# Patient Record
Sex: FEMALE | Race: WHITE | NOT HISPANIC OR LATINO | Employment: FULL TIME | URBAN - METROPOLITAN AREA
[De-identification: names, ages, dates, MRNs, and addresses within clinical notes are randomized per-mention and may not be internally consistent; named-entity substitution may affect disease eponyms.]

---

## 2017-01-10 ENCOUNTER — CLINICAL SUPPORT NO REQUIREMENTS (OUTPATIENT)
Dept: CARDIOLOGY | Facility: CLINIC | Age: 47
End: 2017-01-10

## 2017-01-10 DIAGNOSIS — I42.9 CARDIOMYOPATHY (HCC): Primary | ICD-10-CM

## 2017-06-21 ENCOUNTER — CLINICAL SUPPORT NO REQUIREMENTS (OUTPATIENT)
Dept: CARDIOLOGY | Facility: CLINIC | Age: 47
End: 2017-06-21

## 2017-06-21 ENCOUNTER — OFFICE VISIT (OUTPATIENT)
Dept: CARDIOLOGY | Facility: CLINIC | Age: 47
End: 2017-06-21

## 2017-06-21 VITALS
BODY MASS INDEX: 32.82 KG/M2 | WEIGHT: 197 LBS | SYSTOLIC BLOOD PRESSURE: 118 MMHG | HEIGHT: 65 IN | HEART RATE: 68 BPM | DIASTOLIC BLOOD PRESSURE: 76 MMHG

## 2017-06-21 DIAGNOSIS — I42.8 CARDIOMYOPATHY, NONISCHEMIC (HCC): Primary | ICD-10-CM

## 2017-06-21 DIAGNOSIS — I47.1 ATRIAL TACHYCARDIA, PAROXYSMAL (HCC): ICD-10-CM

## 2017-06-21 DIAGNOSIS — Z95.810 S/P ICD (INTERNAL CARDIAC DEFIBRILLATOR) PROCEDURE: ICD-10-CM

## 2017-06-21 DIAGNOSIS — E66.09 OBESITY DUE TO EXCESS CALORIES, UNSPECIFIED OBESITY SEVERITY: ICD-10-CM

## 2017-06-21 PROBLEM — E66.9 OBESITY: Status: ACTIVE | Noted: 2017-06-21

## 2017-06-21 PROCEDURE — 99213 OFFICE O/P EST LOW 20 MIN: CPT | Performed by: NURSE PRACTITIONER

## 2017-06-21 PROCEDURE — 93284 PRGRMG EVAL IMPLANTABLE DFB: CPT | Performed by: INTERNAL MEDICINE

## 2017-06-21 PROCEDURE — 93000 ELECTROCARDIOGRAM COMPLETE: CPT | Performed by: NURSE PRACTITIONER

## 2017-06-21 NOTE — PROGRESS NOTES
"Date of Office Visit: 2017  Encounter Provider: WILD Lyle  Place of Service: [facility] CARDIOLOGY  Patient Name: Tiffani Fiore  :1970    Chief Complaint   Patient presents with   • Cardiomyopathy     s/p CRT-D   • Irregular Heart Beat   :     HPI: Tiffani Fiore is a 46 y.o. female who is a patient of Dr. Yancey's, who is new to me. Old records reviewed.  She has a past medical history of nonischemic cardiomyopathy, paroxysmal atrial tachycardia, DVT/PE (, was on warfarin for a year), MI (No obstructive disease or intervention), CHFand nonspecific IVCD with left bundle-branch block morphology.  She has status post CRT-D placement.  She had her initial device placed in [place] in 2012. She moved to [place] and saw Dr. Yancey for the first time in 2014.  Over the period of about a year she had episodes of congestive heart failure, decreased LV systolic function, rise in LV lead threshold, phrenic nerve stimulation and loss of LV capture despite good medical therapy and trying to do optimal programming of her device. She underwent CRT-D generator change, new LV lead placement and partial extraction of existing LV lead on November 15, 2015.  Her most recent echocardiogram was in 2016 which per the report showed no significant changes since 2014.  It showed moderate global hypokinesis, ejection fraction 38%, mild MR, trace TR, trace VT in an RVSP of 18 mmHg. She was last seen by Dr. Yancey on May 2, 2016 and was doing well at that time. She presented to the ER on 2016 with complaints of ICD shocks. She had been participating in a \"flash mob\" dance and it shocked her 5 times. It was interrogated in the ER and indeed it had shocker her 5 times for sinus tachycardia at 191 bpm. Her VT rate cutoff was set at 190 bpm. Her rate cutoff was increased and her duration was increased and she was discharged home and followed " up with the pacemaker clinic. Her last remote check was in 2017 which showed 1% Ap, 96% BiV pacing, 22 ATR episodes, longest 1 min. 32 sec. (She is known to have these). She presents today for routine follow up.    She is doing well. No chest pain, shortness of breath, dizziness, edema or palpitations. She is going to the gym 2-4 times per week and working with a .     Device interrogation today shows her most DDD CRT-D function.  She has atrially paced less than 1% and BiV paced 96% of the time since her last evaluation.  Her underlying rhythm is AS/VS at 69 bpm.  She's had 38 ATR episodes since 2017 with the longest one being 2 minutes and 35 seconds.  EGM's that are available to review all show 1-1 conduction sinus tach rate of 170's and all correspond to when she was working out at the gym.          Past Medical History:   Diagnosis Date   • Cardiomyopathy    • Chest pain    • CHF (congestive heart failure)    • Depression    • DVT (deep venous thrombosis)    • Dyspnea    • Fatigue    • Hyperlipidemia    • Migraine    • Myocardial infarction    • Palpitations    • Pulmonary embolism    • Tachycardia        Past Surgical History:   Procedure Laterality Date   • CARDIAC DEFIBRILLATOR PLACEMENT  11/15/2015   •  SECTION     • HYSTERECTOMY     • TUBAL ABDOMINAL LIGATION         Social History     Social History   • Marital status: Single     Spouse name: N/A   • Number of children: N/A   • Years of education: N/A     Occupational History   • Not on file.     Social History Main Topics   • Smoking status: Former Smoker   • Smokeless tobacco: Not on file   • Alcohol use No   • Drug use: No   • Sexual activity: Not on file     Other Topics Concern   • Not on file     Social History Narrative       Family History   Problem Relation Age of Onset   • Diabetes Father    • Cancer Father      lung       Review of Systems   Constitution: Negative for chills, fever, malaise/fatigue, weight gain  "and weight loss.   HENT: Negative for ear pain, headaches, hearing loss, nosebleeds and sore throat.    Eyes: Negative for double vision, pain and visual disturbance.   Cardiovascular: Negative for chest pain, dyspnea on exertion, irregular heartbeat, leg swelling, near-syncope, orthopnea, palpitations, paroxysmal nocturnal dyspnea and syncope.   Respiratory: Negative for cough, shortness of breath, sleep disturbances due to breathing, snoring and wheezing.    Endocrine: Negative for cold intolerance, heat intolerance and polyuria.   Skin: Negative for itching and rash.   Musculoskeletal: Negative for joint pain, joint swelling and myalgias.   Gastrointestinal: Negative for abdominal pain, diarrhea, melena, nausea and vomiting.   Genitourinary: Negative for frequency, hematuria and hesitancy.   Neurological: Negative for excessive daytime sleepiness, light-headedness, numbness, paresthesias and seizures.   Psychiatric/Behavioral: Negative for altered mental status and depression.   Allergic/Immunologic: Negative.    All other systems reviewed and are negative.      No Known Allergies      Current Outpatient Prescriptions:   •  aspirin 81 MG tablet, Take  by mouth daily., Disp: , Rfl:   •  carvedilol (COREG) 25 MG tablet, Take  by mouth 2 (two) times a day., Disp: , Rfl:   •  lisinopril (PRINIVIL,ZESTRIL) 2.5 MG tablet, Take  by mouth daily., Disp: , Rfl:      Objective:     Vitals:    06/21/17 0842   BP: 118/76   Pulse: 68   Weight: 197 lb (89.4 kg)   Height: 65\" (165.1 cm)     Body mass index is 32.78 kg/(m^2).    PHYSICAL EXAM:    Vitals Reviewed.   General Appearance: No acute distress, well developed and well nourished.   Eyes: Conjunctiva and lids: No erythema, swelling, or discharge. Sclera non-icteric.   HENT: Atraumatic, normocephalic. External eyes, ears, and nose normal. No hearing loss noted. Mucous membranes normal. Lips not cyanotic. Neck supple with no tenderness.  Respiratory: No signs of respiratory " distress. Respiration rhythm and depth normal.   Clear to auscultation. No rales, crackles, rhonchi, or wheezing auscultated.   Cardiovascular:  Jugular Venous Pressure: Normal  Heart Rate and Rhythm: Normal, Heart Sounds: Normal S1 and S2. No S3 or S4 noted.  Murmurs: No murmurs noted. No rubs, thrills, or gallops.   Arterial Pulses: Posterior tibialis and dorsalis pedis pulses normal.   Lower Extremities: No edema noted.  Gastrointestinal:  Abdomen soft, non-distended, non-tender. Normal bowel sounds. No hepatomegaly.   Musculoskeletal: Normal movement of extremities  Skin and Nails: General appearance normal. No pallor, cyanosis, diaphoresis. Skin temperature normal. No clubbing of fingernails.   Psychiatric: Patient alert and oriented to person, place, and time. Speech and behavior appropriate. Normal mood and affect.       ECG 12 Lead  Date/Time: 2017 9:13 AM  Performed by: MAGGIE PRESTON  Authorized by: MAGGIE PRESTON   Comparison: compared with previous ECG from 2016  Similar to previous ECG  Rhythm: paced  BPM: 68  Pacin% capture  Clinical impression: abnormal ECG  Comments: Clinical indication: NICM, PAT              Assessment:       Diagnosis Plan   1. Cardiomyopathy, nonischemic  ECG 12 Lead   2. Atrial tachycardia, paroxysmal  ECG 12 Lead   3. S/P ICD (internal cardiac defibrillator) procedure  ECG 12 Lead   4. Obesity due to excess calories, unspecified obesity severity            Plan:       1. NICM, stable on good GDMT. No CHF by history or exam.    2. PAT, no significant episodes.    3. CRT-D, function wnl, ECG with BiV pacing.    4. For the problem of overweight/obesity, we discussed the importance of lifestyle measures and strategies for weight loss, such as improved nutrition, regular exercise and sleep hygiene.        Remote device check in 3 months, clinic in 6 months, see Dr. Yancye in 1 year or sooner should the need arise.     As always, it has been a pleasure to participate  in your patient's care.      Sincerely,         WILD Steele

## 2017-08-22 ENCOUNTER — HOSPITAL ENCOUNTER (INPATIENT)
Facility: HOSPITAL | Age: 47
LOS: 2 days | Discharge: HOME | End: 2017-08-25
Attending: EMERGENCY MEDICINE | Admitting: INTERNAL MEDICINE

## 2017-08-22 DIAGNOSIS — I47.20 VENTRICULAR TACHYCARDIA (HCC): Primary | ICD-10-CM

## 2017-08-22 PROCEDURE — 93005 ELECTROCARDIOGRAM TRACING: CPT | Performed by: EMERGENCY MEDICINE

## 2017-08-22 PROCEDURE — 99285 EMERGENCY DEPT VISIT HI MDM: CPT

## 2017-08-23 ENCOUNTER — APPOINTMENT (OUTPATIENT)
Dept: GENERAL RADIOLOGY | Facility: HOSPITAL | Age: 47
End: 2017-08-23

## 2017-08-23 ENCOUNTER — CLINICAL SUPPORT NO REQUIREMENTS (OUTPATIENT)
Dept: CARDIOLOGY | Facility: CLINIC | Age: 47
End: 2017-08-23

## 2017-08-23 ENCOUNTER — APPOINTMENT (OUTPATIENT)
Dept: CARDIOLOGY | Facility: HOSPITAL | Age: 47
End: 2017-08-23

## 2017-08-23 DIAGNOSIS — I42.9 CARDIOMYOPATHY (HCC): Primary | ICD-10-CM

## 2017-08-23 PROBLEM — I47.20 VENTRICULAR TACHYCARDIA (HCC): Status: ACTIVE | Noted: 2017-08-23

## 2017-08-23 LAB
ALBUMIN SERPL-MCNC: 4.3 G/DL (ref 3.5–5.2)
ALBUMIN/GLOB SERPL: 1.4 G/DL
ALP SERPL-CCNC: 110 U/L (ref 39–117)
ALT SERPL W P-5'-P-CCNC: 13 U/L (ref 1–33)
ANION GAP SERPL CALCULATED.3IONS-SCNC: 13.1 MMOL/L
ANION GAP SERPL CALCULATED.3IONS-SCNC: 13.2 MMOL/L
AORTIC ARCH: 2.2 CM
APTT PPP: 28.4 SECONDS (ref 22.7–35.4)
ASCENDING AORTA: 3.3 CM
AST SERPL-CCNC: 18 U/L (ref 1–32)
BASOPHILS # BLD AUTO: 0.02 10*3/MM3 (ref 0–0.2)
BASOPHILS NFR BLD AUTO: 0.2 % (ref 0–1.5)
BH CV ECHO MEAS - ACS: 1.9 CM
BH CV ECHO MEAS - AO MAX PG: 10 MMHG
BH CV ECHO MEAS - AO MEAN PG (FULL): 3 MMHG
BH CV ECHO MEAS - AO MEAN PG: 6 MMHG
BH CV ECHO MEAS - AO ROOT AREA (BSA CORRECTED): 1.3
BH CV ECHO MEAS - AO ROOT AREA: 4.9 CM^2
BH CV ECHO MEAS - AO ROOT DIAM: 2.5 CM
BH CV ECHO MEAS - AO V2 MAX: 155 CM/SEC
BH CV ECHO MEAS - AO V2 MEAN: 116 CM/SEC
BH CV ECHO MEAS - AO V2 VTI: 37.6 CM
BH CV ECHO MEAS - ASC AORTA: 3.3 CM
BH CV ECHO MEAS - AVA(I,A): 2.1 CM^2
BH CV ECHO MEAS - AVA(I,D): 2.1 CM^2
BH CV ECHO MEAS - BSA(HAYCOCK): 2 M^2
BH CV ECHO MEAS - BSA: 2 M^2
BH CV ECHO MEAS - BZI_BMI: 32.3 KILOGRAMS/M^2
BH CV ECHO MEAS - BZI_METRIC_HEIGHT: 165.1 CM
BH CV ECHO MEAS - BZI_METRIC_WEIGHT: 88 KG
BH CV ECHO MEAS - CONTRAST EF (2CH): 25.6 ML/M^2
BH CV ECHO MEAS - CONTRAST EF 4CH: 24 ML/M^2
BH CV ECHO MEAS - EDV(CUBED): 157.5 ML
BH CV ECHO MEAS - EDV(MOD-SP2): 168 ML
BH CV ECHO MEAS - EDV(MOD-SP4): 229 ML
BH CV ECHO MEAS - EDV(TEICH): 141.3 ML
BH CV ECHO MEAS - EF(CUBED): 34.1 %
BH CV ECHO MEAS - EF(MOD-SP2): 25.6 %
BH CV ECHO MEAS - EF(MOD-SP4): 24 %
BH CV ECHO MEAS - EF(TEICH): 27.6 %
BH CV ECHO MEAS - ESV(CUBED): 103.8 ML
BH CV ECHO MEAS - ESV(MOD-SP2): 125 ML
BH CV ECHO MEAS - ESV(MOD-SP4): 174 ML
BH CV ECHO MEAS - ESV(TEICH): 102.4 ML
BH CV ECHO MEAS - FS: 13 %
BH CV ECHO MEAS - IVS/LVPW: 0.77
BH CV ECHO MEAS - IVSD: 1 CM
BH CV ECHO MEAS - LAT PEAK E' VEL: 6 CM/SEC
BH CV ECHO MEAS - LV DIASTOLIC VOL/BSA (35-75): 117.3 ML/M^2
BH CV ECHO MEAS - LV MASS(C)D: 249.4 GRAMS
BH CV ECHO MEAS - LV MASS(C)DI: 127.7 GRAMS/M^2
BH CV ECHO MEAS - LV MAX PG: 5 MMHG
BH CV ECHO MEAS - LV MEAN PG: 3 MMHG
BH CV ECHO MEAS - LV SYSTOLIC VOL/BSA (12-30): 89.1 ML/M^2
BH CV ECHO MEAS - LV V1 MAX: 107 CM/SEC
BH CV ECHO MEAS - LV V1 MEAN: 78.2 CM/SEC
BH CV ECHO MEAS - LV V1 VTI: 25 CM
BH CV ECHO MEAS - LVIDD: 5.4 CM
BH CV ECHO MEAS - LVIDS: 4.7 CM
BH CV ECHO MEAS - LVLD AP2: 8.6 CM
BH CV ECHO MEAS - LVLD AP4: 9.5 CM
BH CV ECHO MEAS - LVLS AP2: 7.7 CM
BH CV ECHO MEAS - LVLS AP4: 9.2 CM
BH CV ECHO MEAS - LVOT AREA (M): 3.1 CM^2
BH CV ECHO MEAS - LVOT AREA: 3.1 CM^2
BH CV ECHO MEAS - LVOT DIAM: 2 CM
BH CV ECHO MEAS - LVPWD: 1.3 CM
BH CV ECHO MEAS - MED PEAK E' VEL: 6 CM/SEC
BH CV ECHO MEAS - MV A DUR: 0.16 SEC
BH CV ECHO MEAS - MV A MAX VEL: 109 CM/SEC
BH CV ECHO MEAS - MV DEC SLOPE: 943 CM/SEC^2
BH CV ECHO MEAS - MV DEC TIME: 0.16 SEC
BH CV ECHO MEAS - MV E MAX VEL: 95.8 CM/SEC
BH CV ECHO MEAS - MV E/A: 0.88
BH CV ECHO MEAS - MV MAX PG: 9 MMHG
BH CV ECHO MEAS - MV MEAN PG: 3 MMHG
BH CV ECHO MEAS - MV P1/2T MAX VEL: 130 CM/SEC
BH CV ECHO MEAS - MV P1/2T: 40.4 MSEC
BH CV ECHO MEAS - MV V2 MEAN: 84.3 CM/SEC
BH CV ECHO MEAS - MV V2 VTI: 32.8 CM
BH CV ECHO MEAS - MVA P1/2T LCG: 1.7 CM^2
BH CV ECHO MEAS - MVA(P1/2T): 5.4 CM^2
BH CV ECHO MEAS - MVA(VTI): 2.4 CM^2
BH CV ECHO MEAS - PA ACC SLOPE: 1432 CM/SEC^2
BH CV ECHO MEAS - PA ACC TIME: 0.1 SEC
BH CV ECHO MEAS - PA MAX PG (FULL): 6.6 MMHG
BH CV ECHO MEAS - PA MAX PG: 8.9 MMHG
BH CV ECHO MEAS - PA PR(ACCEL): 34.5 MMHG
BH CV ECHO MEAS - PA V2 MAX: 149 CM/SEC
BH CV ECHO MEAS - PI END-D VEL: 128 CM/SEC
BH CV ECHO MEAS - PULM A REVS DUR: 0.09 SEC
BH CV ECHO MEAS - PULM A REVS VEL: 31.6 CM/SEC
BH CV ECHO MEAS - PULM DIAS VEL: 35.9 CM/SEC
BH CV ECHO MEAS - PULM S/D: 1.7
BH CV ECHO MEAS - PULM SYS VEL: 61.5 CM/SEC
BH CV ECHO MEAS - PVA(V,A): 2.1 CM^2
BH CV ECHO MEAS - PVA(V,D): 2.1 CM^2
BH CV ECHO MEAS - QP/QS: 1
BH CV ECHO MEAS - RAP SYSTOLE: 3 MMHG
BH CV ECHO MEAS - RV MAX PG: 2.3 MMHG
BH CV ECHO MEAS - RV MEAN PG: 1 MMHG
BH CV ECHO MEAS - RV V1 MAX: 75.9 CM/SEC
BH CV ECHO MEAS - RV V1 MEAN: 57.2 CM/SEC
BH CV ECHO MEAS - RV V1 VTI: 18.9 CM
BH CV ECHO MEAS - RVOT AREA: 4.2 CM^2
BH CV ECHO MEAS - RVOT DIAM: 2.3 CM
BH CV ECHO MEAS - RVSP: 33 MMHG
BH CV ECHO MEAS - SI(AO): 94.5 ML/M^2
BH CV ECHO MEAS - SI(CUBED): 27.5 ML/M^2
BH CV ECHO MEAS - SI(LVOT): 40.2 ML/M^2
BH CV ECHO MEAS - SI(MOD-SP2): 22 ML/M^2
BH CV ECHO MEAS - SI(MOD-SP4): 28.2 ML/M^2
BH CV ECHO MEAS - SI(TEICH): 19.9 ML/M^2
BH CV ECHO MEAS - SUP REN AO DIAM: 1.5 CM
BH CV ECHO MEAS - SV(AO): 184.6 ML
BH CV ECHO MEAS - SV(CUBED): 53.6 ML
BH CV ECHO MEAS - SV(LVOT): 78.5 ML
BH CV ECHO MEAS - SV(MOD-SP2): 43 ML
BH CV ECHO MEAS - SV(MOD-SP4): 55 ML
BH CV ECHO MEAS - SV(RVOT): 78.5 ML
BH CV ECHO MEAS - SV(TEICH): 39 ML
BH CV ECHO MEAS - TAPSE (>1.6): 1.6 CM2
BH CV ECHO MEAS - TR MAX VEL: 274 CM/SEC
BH CV VAS BP RIGHT ARM: NORMAL MMHG
BH CV XLRA - RV BASE: 3.2 CM
BH CV XLRA - TDI S': 10 CM/SEC
BILIRUB SERPL-MCNC: 0.3 MG/DL (ref 0.1–1.2)
BUN BLD-MCNC: 14 MG/DL (ref 6–20)
BUN BLD-MCNC: 16 MG/DL (ref 6–20)
BUN/CREAT SERPL: 21.5 (ref 7–25)
BUN/CREAT SERPL: 24.6 (ref 7–25)
CALCIUM SPEC-SCNC: 9.3 MG/DL (ref 8.6–10.5)
CALCIUM SPEC-SCNC: 9.4 MG/DL (ref 8.6–10.5)
CHLORIDE SERPL-SCNC: 103 MMOL/L (ref 98–107)
CHLORIDE SERPL-SCNC: 106 MMOL/L (ref 98–107)
CO2 SERPL-SCNC: 25.8 MMOL/L (ref 22–29)
CO2 SERPL-SCNC: 25.9 MMOL/L (ref 22–29)
CREAT BLD-MCNC: 0.65 MG/DL (ref 0.57–1)
CREAT BLD-MCNC: 0.65 MG/DL (ref 0.57–1)
DEPRECATED RDW RBC AUTO: 41.7 FL (ref 37–54)
E/E' RATIO: 17
EOSINOPHIL # BLD AUTO: 0.11 10*3/MM3 (ref 0–0.7)
EOSINOPHIL NFR BLD AUTO: 1.1 % (ref 0.3–6.2)
ERYTHROCYTE [DISTWIDTH] IN BLOOD BY AUTOMATED COUNT: 12.3 % (ref 11.7–13)
GFR SERPL CREATININE-BSD FRML MDRD: 98 ML/MIN/1.73
GFR SERPL CREATININE-BSD FRML MDRD: 98 ML/MIN/1.73
GLOBULIN UR ELPH-MCNC: 3 GM/DL
GLUCOSE BLD-MCNC: 112 MG/DL (ref 65–99)
GLUCOSE BLD-MCNC: 116 MG/DL (ref 65–99)
HCT VFR BLD AUTO: 42 % (ref 35.6–45.5)
HGB BLD-MCNC: 14.4 G/DL (ref 11.9–15.5)
IMM GRANULOCYTES # BLD: 0.02 10*3/MM3 (ref 0–0.03)
IMM GRANULOCYTES NFR BLD: 0.2 % (ref 0–0.5)
INR PPP: 1.16 (ref 0.9–1.1)
LEFT ATRIUM VOLUME INDEX: 23 ML/M2
LV EF 2D ECHO EST: 24 %
LYMPHOCYTES # BLD AUTO: 2.38 10*3/MM3 (ref 0.9–4.8)
LYMPHOCYTES NFR BLD AUTO: 24.5 % (ref 19.6–45.3)
MAGNESIUM SERPL-MCNC: 2.3 MG/DL (ref 1.6–2.6)
MCH RBC QN AUTO: 31.8 PG (ref 26.9–32)
MCHC RBC AUTO-ENTMCNC: 34.3 G/DL (ref 32.4–36.3)
MCV RBC AUTO: 92.7 FL (ref 80.5–98.2)
MONOCYTES # BLD AUTO: 0.64 10*3/MM3 (ref 0.2–1.2)
MONOCYTES NFR BLD AUTO: 6.6 % (ref 5–12)
NEUTROPHILS # BLD AUTO: 6.53 10*3/MM3 (ref 1.9–8.1)
NEUTROPHILS NFR BLD AUTO: 67.4 % (ref 42.7–76)
PLATELET # BLD AUTO: 188 10*3/MM3 (ref 140–500)
PMV BLD AUTO: 10.6 FL (ref 6–12)
POTASSIUM BLD-SCNC: 3.9 MMOL/L (ref 3.5–5.2)
POTASSIUM BLD-SCNC: 4.1 MMOL/L (ref 3.5–5.2)
PROT SERPL-MCNC: 7.3 G/DL (ref 6–8.5)
PROTHROMBIN TIME: 14.4 SECONDS (ref 11.7–14.2)
RBC # BLD AUTO: 4.53 10*6/MM3 (ref 3.9–5.2)
SODIUM BLD-SCNC: 142 MMOL/L (ref 136–145)
SODIUM BLD-SCNC: 145 MMOL/L (ref 136–145)
TROPONIN T SERPL-MCNC: 0.03 NG/ML (ref 0–0.03)
WBC NRBC COR # BLD: 9.7 10*3/MM3 (ref 4.5–10.7)

## 2017-08-23 PROCEDURE — 93010 ELECTROCARDIOGRAM REPORT: CPT | Performed by: INTERNAL MEDICINE

## 2017-08-23 PROCEDURE — 93295 DEV INTERROG REMOTE 1/2/MLT: CPT | Performed by: INTERNAL MEDICINE

## 2017-08-23 PROCEDURE — 93306 TTE W/DOPPLER COMPLETE: CPT | Performed by: INTERNAL MEDICINE

## 2017-08-23 PROCEDURE — 93005 ELECTROCARDIOGRAM TRACING: CPT | Performed by: INTERNAL MEDICINE

## 2017-08-23 PROCEDURE — 85730 THROMBOPLASTIN TIME PARTIAL: CPT | Performed by: EMERGENCY MEDICINE

## 2017-08-23 PROCEDURE — 83735 ASSAY OF MAGNESIUM: CPT | Performed by: EMERGENCY MEDICINE

## 2017-08-23 PROCEDURE — 84484 ASSAY OF TROPONIN QUANT: CPT | Performed by: EMERGENCY MEDICINE

## 2017-08-23 PROCEDURE — 80053 COMPREHEN METABOLIC PANEL: CPT | Performed by: EMERGENCY MEDICINE

## 2017-08-23 PROCEDURE — 80048 BASIC METABOLIC PNL TOTAL CA: CPT | Performed by: NURSE PRACTITIONER

## 2017-08-23 PROCEDURE — 25010000002 AMIODARONE IN DEXTROSE 5% 150-4.21 MG/100ML-% SOLUTION

## 2017-08-23 PROCEDURE — 93296 REM INTERROG EVL PM/IDS: CPT | Performed by: INTERNAL MEDICINE

## 2017-08-23 PROCEDURE — 85025 COMPLETE CBC W/AUTO DIFF WBC: CPT | Performed by: EMERGENCY MEDICINE

## 2017-08-23 PROCEDURE — C8929 TTE W OR WO FOL WCON,DOPPLER: HCPCS

## 2017-08-23 PROCEDURE — 85610 PROTHROMBIN TIME: CPT | Performed by: EMERGENCY MEDICINE

## 2017-08-23 PROCEDURE — 71010 HC CHEST PA OR AP: CPT

## 2017-08-23 PROCEDURE — 99223 1ST HOSP IP/OBS HIGH 75: CPT | Performed by: NURSE PRACTITIONER

## 2017-08-23 PROCEDURE — 25010000002 PERFLUTREN (DEFINITY) 8.476 MG IN SODIUM CHLORIDE 10 ML INJECTION: Performed by: INTERNAL MEDICINE

## 2017-08-23 RX ORDER — ALPRAZOLAM 0.25 MG/1
0.25 TABLET ORAL EVERY 8 HOURS
Status: DISCONTINUED | OUTPATIENT
Start: 2017-08-23 | End: 2017-08-24

## 2017-08-23 RX ORDER — ALPRAZOLAM 0.25 MG/1
0.25 TABLET ORAL EVERY 8 HOURS
Status: DISCONTINUED | OUTPATIENT
Start: 2017-08-23 | End: 2017-08-23

## 2017-08-23 RX ORDER — CARVEDILOL 6.25 MG/1
6.25 TABLET ORAL 2 TIMES DAILY
Status: DISCONTINUED | OUTPATIENT
Start: 2017-08-23 | End: 2017-08-24

## 2017-08-23 RX ORDER — ACETAMINOPHEN 325 MG/1
650 TABLET ORAL EVERY 4 HOURS PRN
Status: DISCONTINUED | OUTPATIENT
Start: 2017-08-23 | End: 2017-08-25 | Stop reason: HOSPADM

## 2017-08-23 RX ORDER — ASPIRIN 81 MG/1
81 TABLET ORAL DAILY
Status: DISCONTINUED | OUTPATIENT
Start: 2017-08-23 | End: 2017-08-25 | Stop reason: HOSPADM

## 2017-08-23 RX ORDER — SODIUM CHLORIDE 0.9 % (FLUSH) 0.9 %
10 SYRINGE (ML) INJECTION AS NEEDED
Status: DISCONTINUED | OUTPATIENT
Start: 2017-08-23 | End: 2017-08-25 | Stop reason: HOSPADM

## 2017-08-23 RX ORDER — CARVEDILOL 25 MG/1
25 TABLET ORAL 2 TIMES DAILY
Status: DISCONTINUED | OUTPATIENT
Start: 2017-08-23 | End: 2017-08-23

## 2017-08-23 RX ORDER — LISINOPRIL 2.5 MG/1
2.5 TABLET ORAL DAILY
Status: DISCONTINUED | OUTPATIENT
Start: 2017-08-23 | End: 2017-08-25 | Stop reason: HOSPADM

## 2017-08-23 RX ORDER — SOTALOL HYDROCHLORIDE 120 MG/1
120 TABLET ORAL EVERY 12 HOURS SCHEDULED
Status: DISCONTINUED | OUTPATIENT
Start: 2017-08-23 | End: 2017-08-25 | Stop reason: HOSPADM

## 2017-08-23 RX ADMIN — ALPRAZOLAM 0.25 MG: 0.25 TABLET ORAL at 17:00

## 2017-08-23 RX ADMIN — LISINOPRIL 2.5 MG: 2.5 TABLET ORAL at 12:25

## 2017-08-23 RX ADMIN — SOTALOL HYDROCHLORIDE 120 MG: 120 TABLET ORAL at 16:47

## 2017-08-23 RX ADMIN — CARVEDILOL 25 MG: 25 TABLET, FILM COATED ORAL at 12:25

## 2017-08-23 RX ADMIN — CARVEDILOL 6.25 MG: 6.25 TABLET, FILM COATED ORAL at 21:42

## 2017-08-23 RX ADMIN — ASPIRIN 81 MG: 81 TABLET ORAL at 12:25

## 2017-08-23 RX ADMIN — AMIODARONE HYDROCHLORIDE 150 MG: 1.5 INJECTION, SOLUTION INTRAVENOUS at 17:29

## 2017-08-23 RX ADMIN — PERFLUTREN 2 ML: 6.52 INJECTION, SUSPENSION INTRAVENOUS at 10:35

## 2017-08-23 NOTE — ED NOTES
AED interrogated with [employer] remote device.     Genesis Lord RN  08/23/17 0152       Genesis Lord RN  08/23/17 0152

## 2017-08-23 NOTE — PLAN OF CARE
Problem: Patient Care Overview (Adult)  Goal: Plan of Care Review  Outcome: Ongoing (interventions implemented as appropriate)  Goal: Discharge Needs Assessment  Outcome: Ongoing (interventions implemented as appropriate)    Problem: Acute Coronary Syndrome (ACS) (Adult)  Goal: Signs and Symptoms of Listed Potential Problems Will be Absent or Manageable (Acute Coronary Syndrome)  Outcome: Ongoing (interventions implemented as appropriate)    Problem: Cardiac: Heart Failure (Adult)  Goal: Signs and Symptoms of Listed Potential Problems Will be Absent or Manageable (Cardiac: Heart Failure)  Outcome: Ongoing (interventions implemented as appropriate)

## 2017-08-23 NOTE — H&P
"            Electrophysiology History & Physical    Date of Hospital Visit: 2017 11:56 PM  Encounter Provider: WILD Lyle  Place of Service: [facility] CARDIOLOGY  Patient Name: Tiffani Fiore  :1970  Referral Provider: Ani Cazares MD      Chief complaint: ICD shocks        History of Present Illness:    This is a 46 y.o. female patient of Dr. Yancey's, who I last saw in the office on 17 for routine follow up. She has a past medical history of nonischemic cardiomyopathy, paroxysmal atrial tachycardia, DVT/PE (, was on warfarin for a year), MI (No obstructive disease or intervention), CHFand nonspecific IVCD with left bundle-branch block morphology.  She is status post CRT-D placement.  She had her initial device placed in [place] in 2012. She also says that she had an EPS/Ablation for VT in [place] (we do not have those records). She moved to [place] and saw Dr. Yancey for the first time in 2014.  Over the period of about a year she had episodes of congestive heart failure, decreased LV systolic function, rise in LV lead threshold, phrenic nerve stimulation and loss of LV capture despite good medical therapy and trying to do optimal programming of her device. She underwent CRT-D generator change, new LV lead placement and partial extraction of existing LV lead on November 15, 2015.  Her most recent echocardiogram was in 2016 which showed no significant changes since 2014.  It showed moderate global hypokinesis, ejection fraction 38%, mild MR, trace TR, trace WA in an RVSP of 18 mmHg. She presented to the ER on 2016 with complaints of ICD shocks. She had been participating in a \"flash mob\" dance and it shocked her 5 times. It was interrogated in the ER and indeed it had shocker her 5 times for sinus tachycardia at 191 bpm. Her VT rate cutoff was set at 190 bpm. Her rate cutoff and duration were increased and " she has had no further shocks until yesterday. She does have ATR episodes which are typically short in duration.    She presented to the ED with complaints of her ICD shocking her twice. She says that she was at work and was walking out of and office and her device shocked her. Just prior to that when she was sitting in the office she felt her heart start racing and said she got really hot. The ICD shocked her once and she sat down and then it shocked her again. She did not have any chest pain, shortness of breath and did not feel like she was going to pass out. In the ER the device was remotely interrogated and confirmed VT episodes so she was admitted for further evaluation. She has not had any further episodes since admission. Prior to admission she was doing well with no complaints.           Past Medical History:   Diagnosis Date   • Cardiomyopathy    • Chest pain    • CHF (congestive heart failure)    • Depression    • DVT (deep venous thrombosis)    • Dyspnea    • Fatigue    • Hyperlipidemia    • Migraine    • Myocardial infarction    • Palpitations    • Pulmonary embolism    • Tachycardia          Past Surgical History:   Procedure Laterality Date   • CARDIAC DEFIBRILLATOR PLACEMENT  11/15/2015   •  SECTION     • HYSTERECTOMY     • TUBAL ABDOMINAL LIGATION         Family History   Problem Relation Age of Onset   • Diabetes Father    • Cancer Father      lung       Prescriptions Prior to Admission   Medication Sig Dispense Refill Last Dose   • aspirin 81 MG tablet Take  by mouth daily.   Taking   • carvedilol (COREG) 25 MG tablet Take  by mouth 2 (two) times a day.   Taking   • lisinopril (PRINIVIL,ZESTRIL) 2.5 MG tablet Take  by mouth daily.   Taking       Current Meds  No current facility-administered medications on file prior to encounter.      Current Outpatient Prescriptions on File Prior to Encounter   Medication Sig Dispense Refill   • aspirin 81 MG tablet Take  by mouth daily.     • carvedilol  "(COREG) 25 MG tablet Take  by mouth 2 (two) times a day.     • lisinopril (PRINIVIL,ZESTRIL) 2.5 MG tablet Take  by mouth daily.           Social History     Social History   • Marital status: Single     Spouse name: N/A   • Number of children: N/A   • Years of education: N/A     Occupational History   • Not on file.     Social History Main Topics   • Smoking status: Former Smoker   • Smokeless tobacco: Not on file   • Alcohol use No   • Drug use: No   • Sexual activity: Not on file     Other Topics Concern   • Not on file     Social History Narrative         REVIEW OF SYSTEMS: All systems reviewed. Pertinent positives identified in HPI. All other systems are negative.     12 point ROS was performed and is negative except as outlined in HPI           Objective:     Vitals:    08/23/17 0329 08/23/17 0330 08/23/17 0706 08/23/17 1505   BP:  95/66 105/68 100/58   BP Location:   Right arm Right arm   Patient Position:   Lying Sitting   Pulse: 82 80 66 81   Resp:  16 16 16   Temp:  97.8 °F (36.6 °C) 97.6 °F (36.4 °C) 97.6 °F (36.4 °C)   TempSrc:  Oral Oral Oral   SpO2: 98% 97% 100% 97%   Weight:  194 lb (88 kg)     Height:  65\" (165.1 cm)       Body mass index is 32.28 kg/(m^2).  Flowsheet Rows         First Filed Value    Admission Height  65\" (165.1 cm) Documented at 08/22/2017 2352    Admission Weight  195 lb (88.5 kg) Documented at 08/22/2017 2352          PHYSICAL EXAM:    Vitals Reviewed.   General Appearance: No acute distress, well developed and well nourished.   Eyes: Conjunctiva and lids: No erythema, swelling, or discharge. Sclera non-icteric.   HENT: Atraumatic, normocephalic. External eyes, ears, and nose normal. No hearing loss noted. Mucous membranes normal. Lips not cyanotic. Neck supple with no tenderness.  Respiratory: No signs of respiratory distress. Respiration rhythm and depth normal.   Clear to auscultation. No rales, crackles, rhonchi, or wheezing auscultated.   Cardiovascular:  Jugular Venous " Pressure: Normal  Heart Rate and Rhythm: Normal, Heart Sounds: Normal S1 and S2. No S3 or S4 noted.  Murmurs: No murmurs noted. No rubs, thrills, or gallops.   Arterial Pulses: Posterior tibialis and dorsalis pedis pulses normal.   Lower Extremities: No edema noted.  Gastrointestinal:  Abdomen soft, non-distended, non-tender. Normal bowel sounds.   Musculoskeletal: Normal movement of extremities  Skin and Nails: General appearance normal. No pallor, cyanosis, diaphoresis. Skin temperature normal. No clubbing of fingernails.   Psychiatric: Patient alert and oriented to person, place, and time. Speech and behavior appropriate. Normal mood and affect.                                                                   Lab Review:        Results from last 7 days  Lab Units 17  1354 17  0127   SODIUM mmol/L 142 145   POTASSIUM mmol/L 4.1 3.9   CHLORIDE mmol/L 103 106   CO2 mmol/L 25.8 25.9   BUN mg/dL 16 14   CREATININE mg/dL 0.65 0.65   GLUCOSE mg/dL 116* 112*   CALCIUM mg/dL 9.3 9.4   AST (SGOT) U/L  --  18   ALT (SGPT) U/L  --  13       Results from last 7 days  Lab Units 17  0127   TROPONIN T ng/mL 0.031*       Results from last 7 days  Lab Units 17  0127   WBC 10*3/mm3 9.70   HEMOGLOBIN g/dL 14.4   HEMATOCRIT % 42.0   PLATELETS 10*3/mm3 188       Results from last 7 days  Lab Units 17  0127   INR  1.16*   APTT seconds 28.4           Results from last 7 days  Lab Units 17  0127   MAGNESIUM mg/dL 2.3                             [id number] CXR:       IMPRESSION:      No active disease by portable imaging.    17 Echo:        Interpretation Summary      · Left ventricular systolic function is severely decreased. Calculated EF = 24%. Estimated EF was in agreement with the calculated EF. Estimated EF = 24%. Normal left ventricular wall thickness noted. The left ventricular cavity is mild-to-moderately dilated. Left ventricular diastolic dysfunction is noted (grade I a w/high  LAP) consistent with impaired relaxation.  · Mildly reduced right ventricular systolic function noted. Electronic lead present in the ventricle.  · Mild mitral valve regurgitation is present.  · The tricuspid valve is abnormal. Mild tricuspid valve regurgitation is present. Estimated right ventricular systolic pressure from tricuspid regurgitation is normal (<35 mmHg).         2/19/16 Echo:          EKG:           I personally viewed and interpreted the patient's EKG/Telemetry data    Assessment:    Active Problems:    Ventricular tachycardia       Plan:       Dr. Yancey and I saw Ms. Fiore this afternoon. She has a history of NICM, s/p CRT-D. She had VT and appropriate therapy from her device. We reviewed the echo done today and he thinks her EF looks better than the calculated 24% and does not think that her heart function has deteriorated. We are going to start her on sotalol and monitor her for the next few days. If her rhythm and QT remain stable will likely discharge home on Friday afternoon.       WILD Lyle  [facility]  08/23/17  4:10 PM

## 2017-08-23 NOTE — ED PROVIDER NOTES
EMERGENCY DEPARTMENT ENCOUNTER    CHIEF COMPLAINT  Chief Complaint: palpitations  History given by: pt  History limited by: none  Room Number:   PMD: No Known Provider  Cardiologist: Dr. Yancey    HPI:  Pt is a 47 y.o. female with a history of cardiomyopathy who presents after her defibrillator shocked her twice earlier tonight. She experienced a slight headache and nausea, but states it was nothing out of the ordinary. Pt began feeling diaphoretic prior to being shocked the first time. There are no other sx complaints at this time.     Duration:  Since earlier tonight  Onset: gradual  Timing: intermittent  Intensity/Severity: mild  Progression: resolved  Associated Symptoms: headache, nausea, diaphoresis  Aggravating Factors: none stated  Alleviating Factors: none stated  Previous Episodes: Pt has a h/o cardiomyopathy  Treatment before arrival: none stated    PAST MEDICAL HISTORY  Active Ambulatory Problems     Diagnosis Date Noted   • Cardiomyopathy, nonischemic 2016   • Atrial tachycardia, paroxysmal 2016   • S/P ICD (internal cardiac defibrillator) procedure 2017   • Obesity 2017     Resolved Ambulatory Problems     Diagnosis Date Noted   • No Resolved Ambulatory Problems     Past Medical History:   Diagnosis Date   • Cardiomyopathy    • Chest pain    • CHF (congestive heart failure)    • Depression    • DVT (deep venous thrombosis)    • Dyspnea    • Fatigue    • Hyperlipidemia    • Migraine    • Myocardial infarction    • Palpitations    • Pulmonary embolism    • Tachycardia        PAST SURGICAL HISTORY  Past Surgical History:   Procedure Laterality Date   • CARDIAC DEFIBRILLATOR PLACEMENT  11/15/2015   •  SECTION     • HYSTERECTOMY     • TUBAL ABDOMINAL LIGATION         FAMILY HISTORY  Family History   Problem Relation Age of Onset   • Diabetes Father    • Cancer Father      lung       SOCIAL HISTORY  Social History     Social History   • Marital status: Single      Spouse name: N/A   • Number of children: N/A   • Years of education: N/A     Occupational History   • Not on file.     Social History Main Topics   • Smoking status: Former Smoker   • Smokeless tobacco: Not on file   • Alcohol use No   • Drug use: No   • Sexual activity: Not on file     Other Topics Concern   • Not on file     Social History Narrative       ALLERGIES  Review of patient's allergies indicates no known allergies.    REVIEW OF SYSTEMS  Review of Systems   Constitutional: Positive for diaphoresis. Negative for fever.   HENT: Negative for sore throat.    Eyes: Negative.    Respiratory: Negative for cough and shortness of breath.    Cardiovascular: Positive for palpitations. Negative for chest pain.   Gastrointestinal: Positive for nausea. Negative for abdominal pain, diarrhea and vomiting.   Genitourinary: Negative for dysuria.   Musculoskeletal: Negative for neck pain.   Skin: Negative for rash.   Allergic/Immunologic: Negative.    Neurological: Positive for headaches. Negative for weakness and numbness.   Hematological: Negative.    Psychiatric/Behavioral: Negative.    All other systems reviewed and are negative.      PHYSICAL EXAM  ED Triage Vitals   Temp Heart Rate Resp BP SpO2   08/22/17 2352 08/22/17 2352 08/22/17 2352 08/22/17 2352 08/22/17 2352   99 °F (37.2 °C) 92 16 124/79 98 %      Temp src Heart Rate Source Patient Position BP Location FiO2 (%)   08/22/17 2352 -- -- -- --   Tympanic           Physical Exam   Constitutional: She is oriented to person, place, and time and well-developed, well-nourished, and in no distress. No distress.   HENT:   Head: Normocephalic and atraumatic.   Eyes: EOM are normal. Pupils are equal, round, and reactive to light.   Neck: Normal range of motion. Neck supple.   Cardiovascular: Normal rate, regular rhythm and normal heart sounds.    Pulmonary/Chest: Effort normal and breath sounds normal. No respiratory distress.   Abdominal: Soft. There is no tenderness.  There is no rebound and no guarding.   Musculoskeletal: Normal range of motion. She exhibits no edema.   Neurological: She is alert and oriented to person, place, and time. She has normal sensation and normal strength.   Skin: Skin is warm and dry. No rash noted.   Psychiatric: Mood and affect normal.   Nursing note and vitals reviewed.      LAB RESULTS  Lab Results (last 24 hours)     Procedure Component Value Units Date/Time    CBC & Differential [[id number]] Collected:  08/23/17 0127    Specimen:  Blood Updated:  08/23/17 0149    Narrative:       The following orders were created for panel order CBC & Differential.  Procedure                               Abnormality         Status                     ---------                               -----------         ------                     CBC Auto Differential[[id number]]         Normal              Final result                 Please view results for these tests on the individual orders.    Comprehensive Metabolic Panel [[id number]]  (Abnormal) Collected:  08/23/17 0127    Specimen:  Blood Updated:  08/23/17 0204     Glucose 112 (H) mg/dL      BUN 14 mg/dL      Creatinine 0.65 mg/dL      Sodium 145 mmol/L      Potassium 3.9 mmol/L      Chloride 106 mmol/L      CO2 25.9 mmol/L      Calcium 9.4 mg/dL      Total Protein 7.3 g/dL      Albumin 4.30 g/dL      ALT (SGPT) 13 U/L      AST (SGOT) 18 U/L      Alkaline Phosphatase 110 U/L      Total Bilirubin 0.3 mg/dL      eGFR Non African Amer 98 mL/min/1.73      Globulin 3.0 gm/dL      A/G Ratio 1.4 g/dL      BUN/Creatinine Ratio 21.5     Anion Gap 13.1 mmol/L     Protime-INR [[id number]]  (Abnormal) Collected:  08/23/17 0127    Specimen:  Blood Updated:  08/23/17 0158     Protime 14.4 (H) Seconds      INR 1.16 (H)    aPTT [[id number]]  (Normal) Collected:  08/23/17 0127    Specimen:  Blood Updated:  08/23/17 0158     PTT 28.4 seconds     Magnesium [[id number]]  (Normal) Collected:  08/23/17 0127    Specimen:  Blood Updated:  08/23/17  0204     Magnesium 2.3 mg/dL     CBC Auto Differential [[id number]]  (Normal) Collected:  08/23/17 0127    Specimen:  Blood Updated:  08/23/17 0149     WBC 9.70 10*3/mm3      RBC 4.53 10*6/mm3      Hemoglobin 14.4 g/dL      Hematocrit 42.0 %      MCV 92.7 fL      MCH 31.8 pg      MCHC 34.3 g/dL      RDW 12.3 %      RDW-SD 41.7 fl      MPV 10.6 fL      Platelets 188 10*3/mm3      Neutrophil % 67.4 %      Lymphocyte % 24.5 %      Monocyte % 6.6 %      Eosinophil % 1.1 %      Basophil % 0.2 %      Immature Grans % 0.2 %      Neutrophils, Absolute 6.53 10*3/mm3      Lymphocytes, Absolute 2.38 10*3/mm3      Monocytes, Absolute 0.64 10*3/mm3      Eosinophils, Absolute 0.11 10*3/mm3      Basophils, Absolute 0.02 10*3/mm3      Immature Grans, Absolute 0.02 10*3/mm3           I ordered the above labs and reviewed the results    RADIOLOGY  XR Chest 1 View   Final Result       No active disease by portable imaging.       This report was finalized on 8/23/2017 1:10 AM by Mark Lincoln MD.               I ordered the above noted radiological studies. Interpreted by radiologist.Reviewed by me in PACS.       PROCEDURES  Procedures    EKG           EKG time: 0007  Rhythm/Rate: Atrial paced at 86  P waves and TN: no acute ischemia    Interpreted Contemporaneously by me, independently viewed  unchanged compared to prior 09/21/16    PROGRESS AND CONSULTS  ED Course   2357: Ordered ECG per palpitation protocol.     0052: Ordered CBC, Mg, aPTT, PT-INR, CMP and CXR for further evaluation.     0137: Spoke with [employer]. We will use her device and interrogate her here.     0150: Informed pt of plan to admit for ventricular tachycardia. Pt understands and agrees with the plan. All questions answered.    0200: Discussed patient's case with Dr. Cazares, cardiology, including concerns regarding pt's episodes of ventricular tachycardia determined by Aldo, [employer] Representative. She agrees to admit.       MEDICAL DECISION  MAKING  Results were reviewed/discussed with the patient and they were also made aware of online access. Pt also made aware that some labs, such as cultures, will not be resulted during ER visit and follow up with PMD is necessary.     MDM  Number of Diagnoses or Management Options     Amount and/or Complexity of Data Reviewed  Clinical lab tests: ordered and reviewed (Lab results are unremarkable)  Tests in the radiology section of CPT®: ordered and reviewed (XR Chest 1 View  Final Result     No active disease by portable imaging.)  Tests in the medicine section of CPT®: ordered and reviewed (EKG - See EKG procedure note)  Decide to obtain previous medical records or to obtain history from someone other than the patient: yes  Review and summarize past medical records: yes  Independent visualization of images, tracings, or specimens: yes    Patient Progress  Patient progress: stable         DIAGNOSIS  Final diagnoses:   Ventricular tachycardia       DISPOSITION  ADMISSION    Discussed treatment plan and reason for admission with pt/family and admitting physician.  Pt/family voiced understanding of the plan for admission for further testing/treatment as needed.         Latest Documented Vital Signs:  As of 2:15 AM  BP- 94/77 HR- 76 Temp- 99 °F (37.2 °C) (Tympanic) O2 sat- 96%    --  Documentation assistance provided by maria alejandra Matias for Dr. Carreno.  Information recorded by the maria alejandra was done at my direction and has been verified and validated by me.         Rochelle Matias  08/23/17 0225       William Carreno MD  08/23/17 0232

## 2017-08-24 ENCOUNTER — CLINICAL SUPPORT NO REQUIREMENTS (OUTPATIENT)
Dept: CARDIOLOGY | Facility: CLINIC | Age: 47
End: 2017-08-24

## 2017-08-24 DIAGNOSIS — I42.9 CARDIOMYOPATHY (HCC): Primary | ICD-10-CM

## 2017-08-24 LAB
ANION GAP SERPL CALCULATED.3IONS-SCNC: 12.7 MMOL/L
BUN BLD-MCNC: 14 MG/DL (ref 6–20)
BUN/CREAT SERPL: 19.4 (ref 7–25)
CALCIUM SPEC-SCNC: 8.9 MG/DL (ref 8.6–10.5)
CHLORIDE SERPL-SCNC: 105 MMOL/L (ref 98–107)
CO2 SERPL-SCNC: 24.3 MMOL/L (ref 22–29)
CREAT BLD-MCNC: 0.72 MG/DL (ref 0.57–1)
GFR SERPL CREATININE-BSD FRML MDRD: 87 ML/MIN/1.73
GLUCOSE BLD-MCNC: 99 MG/DL (ref 65–99)
GLUCOSE BLDC GLUCOMTR-MCNC: 97 MG/DL (ref 70–130)
POTASSIUM BLD-SCNC: 3.9 MMOL/L (ref 3.5–5.2)
SODIUM BLD-SCNC: 142 MMOL/L (ref 136–145)

## 2017-08-24 PROCEDURE — 82962 GLUCOSE BLOOD TEST: CPT

## 2017-08-24 PROCEDURE — 80048 BASIC METABOLIC PNL TOTAL CA: CPT | Performed by: NURSE PRACTITIONER

## 2017-08-24 PROCEDURE — 93005 ELECTROCARDIOGRAM TRACING: CPT | Performed by: NURSE PRACTITIONER

## 2017-08-24 PROCEDURE — 25010000002 ENOXAPARIN PER 10 MG: Performed by: NURSE PRACTITIONER

## 2017-08-24 PROCEDURE — 99233 SBSQ HOSP IP/OBS HIGH 50: CPT | Performed by: NURSE PRACTITIONER

## 2017-08-24 PROCEDURE — 93010 ELECTROCARDIOGRAM REPORT: CPT | Performed by: INTERNAL MEDICINE

## 2017-08-24 RX ORDER — ALPRAZOLAM 0.25 MG/1
0.25 TABLET ORAL EVERY 8 HOURS PRN
Status: DISCONTINUED | OUTPATIENT
Start: 2017-08-24 | End: 2017-08-25 | Stop reason: HOSPADM

## 2017-08-24 RX ADMIN — ALPRAZOLAM 0.25 MG: 0.25 TABLET ORAL at 01:02

## 2017-08-24 RX ADMIN — LISINOPRIL 2.5 MG: 2.5 TABLET ORAL at 12:26

## 2017-08-24 RX ADMIN — SOTALOL HYDROCHLORIDE 120 MG: 120 TABLET ORAL at 05:41

## 2017-08-24 RX ADMIN — ENOXAPARIN SODIUM 40 MG: 40 INJECTION SUBCUTANEOUS at 10:21

## 2017-08-24 RX ADMIN — SOTALOL HYDROCHLORIDE 120 MG: 120 TABLET ORAL at 18:32

## 2017-08-24 RX ADMIN — ASPIRIN 81 MG: 81 TABLET ORAL at 10:21

## 2017-08-24 NOTE — PLAN OF CARE
Problem: Patient Care Overview (Adult)  Goal: Plan of Care Review  Outcome: Ongoing (interventions implemented as appropriate)    08/24/17 1902   Coping/Psychosocial Response Interventions   Plan Of Care Reviewed With patient   Patient Care Overview   Progress improving   Outcome Evaluation   Outcome Summary/Follow up Plan Pt remains in a paced rhythm, no V Tach this shift. No c/o's this shift. Sotalol administered at 18:30. Blood pressure was running low this AM (80s/50s), carvedilol discontinued. Will continue to monitor.

## 2017-08-24 NOTE — PROGRESS NOTES
Electrophysiology Follow-Up Note      Patient Name: Tiffani Fiore  Age/Sex: 47 y.o. female  : 1970  MRN: [id number]      Day of Service: 17       Chief Complaint/Follow-up: ICD shocks    Interval History: 47 year old with past medical history of nonischemic cardiomyopathy, paroxysmal atrial tachycardia, DVT/PE (, was on warfarin for a year), MI (No obstructive disease or intervention), CHFand nonspecific IVCD with left bundle-branch block morphology.  She is status post CRT-D placement.  She had her initial device placed in [place] in 2012. She also says that she had an EPS/Ablation for VT in [place] (we do not have those records). She moved to [place] and saw Dr. Yancey for the first time in 2014. She had multiple CHF episodes over a year despite trying to optimize her CRT device so in 2015 she underwent generator change and new LV lead and has done well since that time. In 2016 she did receive inappropriate shocks for ST, her parameters were adjusted. She presented to ED on  with ICD shock x 2. She was symptomatic with dizziness, nausea and diaphoresis but did not pass out. Prior to admission she had been feeling well with no complaints.     S: Feels good this morning She had more VT yesterday afternoon and did receive 1 shock.       Temp:  [97.5 °F (36.4 °C)-98.1 °F (36.7 °C)] 97.5 °F (36.4 °C)  Heart Rate:  [65-81] 68  Resp:  [16] 16  BP: ()/(53-58) 86/58     PHYSICAL EXAM:    General Appearance: No acute distress, well developed and well nourished.   Eyes: Conjunctiva and lids: No erythema, swelling, or discharge. Sclera non-icteric.   HENT: Atraumatic, normocephalic. External eyes, ears, and nose normal. No hearing loss noted. Mucous membranes normal. Lips not cyanotic. Neck supple with no tenderness.  Respiratory: No signs of respiratory distress. Respiration rhythm and depth normal.   Clear to auscultation. No rales, crackles, rhonchi, or wheezing  auscultated.   Cardiovascular:  Jugular Venous Pressure: Normal  Heart Rate and Rhythm: Normal, Heart Sounds: Normal S1 and S2. No S3 or S4 noted.  Murmurs: No murmurs noted. No rubs, thrills, or gallops.   Arterial Pulses: Posterior tibialis and dorsalis pedis pulses normal.   Lower Extremities: No edema noted.  Gastrointestinal:  Abdomen soft, non-distended, non-tender. Normal bowel sounds.  Musculoskeletal: Normal movement of extremities  Skin and Nails: General appearance normal. No pallor, cyanosis, diaphoresis. Skin temperature normal. No clubbing of fingernails.   Psychiatric: Patient alert and oriented to person, place, and time. Speech and behavior appropriate. Normal mood and affect.       ECG/TELE:                           Results from last 7 days  Lab Units 08/24/17  0342 08/23/17  1354 08/23/17  0127   SODIUM mmol/L 142 142 145   POTASSIUM mmol/L 3.9 4.1 3.9   CHLORIDE mmol/L 105 103 106   CO2 mmol/L 24.3 25.8 25.9   BUN mg/dL 14 16 14   CREATININE mg/dL 0.72 0.65 0.65   GLUCOSE mg/dL 99 116* 112*   CALCIUM mg/dL 8.9 9.3 9.4       Results from last 7 days  Lab Units 08/23/17  0127   WBC 10*3/mm3 9.70   HEMOGLOBIN g/dL 14.4   HEMATOCRIT % 42.0   PLATELETS 10*3/mm3 188       Results from last 7 days  Lab Units 08/23/17  0127   INR  1.16*       Results from last 7 days  Lab Units 08/23/17  0127   TROPONIN T ng/mL 0.031*             Current Medications:   Scheduled Meds:  amiodarone 150 mg Intravenous Once   aspirin 81 mg Oral Daily   carvedilol 6.25 mg Oral BID   lisinopril 2.5 mg Oral Daily   sotalol 120 mg Oral Q12H     Continuous Infusions:       Active Problems:    Ventricular tachycardia       Plan:     NICM/VT, multiple morphology's. Sotalol started yesterday afternoon, she did have VT ('s) 8/23 afternoon, device delivered ATP therapy that was unsuccessful and then delivered a shock with successful conversion. She then had another episodes of WCT HR only 150's (different morphology), she was  given 150mg bolus of IV amio and did convert, she has had no further arrhythmias. Reviewed with Dr. Yancey and Dr. Mo. QT okay, continue with sotalol. Blood pressure low this am, will stop carvedilol.       Maci Menchaca, WILD  08/24/17  8:23 AM

## 2017-08-24 NOTE — PROGRESS NOTES
Discharge Planning Assessment  [facility]     Patient Name: Tiffani Fiore  MRN: [id number]  Today's Date: 8/24/2017    Admit Date: 8/22/2017          Discharge Needs Assessment       08/24/17 1621    Living Environment    Lives With significant other    Living Arrangements apartment    Home Accessibility no concerns;bed and bath on same level    Stair Railings at Home none    Type of Financial/Environmental Concern none    Transportation Available family or friend will provide    Living Environment    Provides Primary Care For no one    Quality Of Family Relationships supportive;involved;helpful    Able to Return to Prior Living Arrangements yes    Discharge Needs Assessment    Concerns To Be Addressed no discharge needs identified;denies needs/concerns at this time    Anticipated Changes Related to Illness none    Equipment Currently Used at Home none    Equipment Needed After Discharge none    Discharge Disposition still a patient    Discharge Contact Information if Applicable Chidi mckinley [phone]            Discharge Plan       08/24/17 1621    Case Management/Social Work Plan    Plan Home denies d/c needs.    Patient/Family In Agreement With Plan unable to assess    Additional Comments Spoke with Pt at bedside.  CCP introduced self and role.  Pt confirmed information on face sheet.  Pt states [employer], works full time & drives.  Pt lives with arlen Murillo.  Pt denies past home health, subacute rehab & DME.  Pt confirms pharmacy as [facility] on [facility].  Pt has no PCP stating she has not needed one since moving to [place] from [place].  CCP gave Pt a [facility] [place] provider list and the appointment liaison phone number.  Pt cardiologist is Dr. Yancey.  Pt denies issues with affording her medications.  Pt states she plans to return home at discharge.  Pt denies discharge needs at this time.  CCP following.          Discharge Placement     No information found                Demographic Summary       08/24/17  1616    Referral Information    Admission Type inpatient    Contact Information    Permission Granted to Share Information With family/designee    Primary Care Physician Information    Name No PCP.  Pt given the [facility] [place] and the appointment liaison phone number by [facility].              Functional Status       08/24/17 1620    Functional Status Current    Ambulation 0-->independent    Transferring 0-->independent    Toileting 0-->independent    Bathing 0-->independent    Dressing 0-->independent    Eating 0-->independent    Communication 0-->understands/communicates without difficulty    Swallowing (if score 2 or more for any item, consult Rehab Services) 0-->swallows foods/liquids without difficulty    Change in Functional Status Since Onset of Current Illness/Injury no    Functional Status Prior    Ambulation 0-->independent    Transferring 0-->independent    Toileting 0-->independent    Bathing 0-->independent    Dressing 0-->independent    Eating 0-->independent    Communication 0-->understands/communicates without difficulty    Swallowing 0-->swallows foods/liquids without difficulty    Cognitive/Perceptual/Developmental    Current Mental Status/Cognitive Functioning no deficits noted            Psychosocial     None            Abuse/Neglect     None            Legal     None            Substance Abuse     None            Patient Forms     None          Nanette Galo RN

## 2017-08-24 NOTE — PLAN OF CARE
Problem: Patient Care Overview (Adult)  Goal: Plan of Care Review  Outcome: Ongoing (interventions implemented as appropriate)    08/24/17 0603   Coping/Psychosocial Response Interventions   Plan Of Care Reviewed With patient   Patient Care Overview   Progress improving   Outcome Evaluation   Outcome Summary/Follow up Plan VSS. No VT this shift. Pt has been relaxed, seemed to rest well this shift. Xanax given at 0100, Sotalol give at 0545. Pt wants an 8/8 schedule. Will continue to monitor closely.

## 2017-08-25 VITALS
HEART RATE: 78 BPM | BODY MASS INDEX: 32.35 KG/M2 | RESPIRATION RATE: 18 BRPM | DIASTOLIC BLOOD PRESSURE: 55 MMHG | SYSTOLIC BLOOD PRESSURE: 105 MMHG | HEIGHT: 65 IN | TEMPERATURE: 97.2 F | WEIGHT: 194.13 LBS | OXYGEN SATURATION: 96 %

## 2017-08-25 LAB
ANION GAP SERPL CALCULATED.3IONS-SCNC: 14.8 MMOL/L
BUN BLD-MCNC: 16 MG/DL (ref 6–20)
BUN/CREAT SERPL: 21.6 (ref 7–25)
CALCIUM SPEC-SCNC: 8.5 MG/DL (ref 8.6–10.5)
CHLORIDE SERPL-SCNC: 106 MMOL/L (ref 98–107)
CO2 SERPL-SCNC: 22.2 MMOL/L (ref 22–29)
CREAT BLD-MCNC: 0.74 MG/DL (ref 0.57–1)
GFR SERPL CREATININE-BSD FRML MDRD: 84 ML/MIN/1.73
GLUCOSE BLD-MCNC: 92 MG/DL (ref 65–99)
POTASSIUM BLD-SCNC: 4.1 MMOL/L (ref 3.5–5.2)
SODIUM BLD-SCNC: 143 MMOL/L (ref 136–145)

## 2017-08-25 PROCEDURE — 25010000002 ENOXAPARIN PER 10 MG: Performed by: NURSE PRACTITIONER

## 2017-08-25 PROCEDURE — 93005 ELECTROCARDIOGRAM TRACING: CPT | Performed by: NURSE PRACTITIONER

## 2017-08-25 PROCEDURE — 93010 ELECTROCARDIOGRAM REPORT: CPT | Performed by: INTERNAL MEDICINE

## 2017-08-25 PROCEDURE — 80048 BASIC METABOLIC PNL TOTAL CA: CPT | Performed by: NURSE PRACTITIONER

## 2017-08-25 PROCEDURE — 99238 HOSP IP/OBS DSCHRG MGMT 30/<: CPT | Performed by: NURSE PRACTITIONER

## 2017-08-25 RX ORDER — SOTALOL HYDROCHLORIDE 120 MG/1
120 TABLET ORAL EVERY 12 HOURS SCHEDULED
Qty: 60 TABLET | Refills: 6 | Status: SHIPPED | OUTPATIENT
Start: 2017-08-25 | End: 2017-09-01 | Stop reason: HOSPADM

## 2017-08-25 RX ADMIN — LISINOPRIL 2.5 MG: 2.5 TABLET ORAL at 09:00

## 2017-08-25 RX ADMIN — SOTALOL HYDROCHLORIDE 120 MG: 120 TABLET ORAL at 07:27

## 2017-08-25 RX ADMIN — ENOXAPARIN SODIUM 40 MG: 40 INJECTION SUBCUTANEOUS at 09:00

## 2017-08-25 RX ADMIN — ASPIRIN 81 MG: 81 TABLET ORAL at 09:00

## 2017-08-25 NOTE — PAYOR COMM NOTE
"Tiffani Lopez (47 y.o. Female)           ATTENTION; CASE REF [id number]  , CLINICALS FOR YOUR REVIEW, WAS INFORMED BY DILLON FLETCHER THAT ONE         DAY WAS AUTHORIZED, PT STILL IN HOSPITAL , CLINICALS FOR 8/23-8/24, POSSIBLE DISCHARGE TODAY 8/25  . REPLY TO UR DEPT         MINI DAVILA  [phone] OR UR  [phone]         Date of Birth Social Security Number Address Home Phone MRN    1970  [id number] [facility]  [address] [place] [id number] [phone] [id number]    Faith Marital Status          None Single       Admission Date Admission Type Admitting Provider Attending Provider Department, Room/Bed    8/22/17 Emergency Grayson Yancey MD Mandrola, John, MD [address], 2200/1    Discharge Date Discharge Disposition Discharge Destination                      Attending Provider: Grayson Yancey MD     Allergies:  No Known Allergies    Isolation:  None   Infection:  None   Code Status:  FULL    Ht:  65\" (165.1 cm)   Wt:  194 lb 2 oz (88.1 kg)    Admission Cmt:  None   Principal Problem:  None                Active Insurance as of 8/22/2017     Primary Coverage     Payor Plan Insurance Group Employer/Plan Group    [employer] BLUE CROSS AN[employer] BLUE CROSS BLUE SHIELD PPO [id number]     Payor Plan Address Payor Plan Phone Number Effective From Effective To    [address] [phone] 1/1/2016     [place] [address]       Subscriber Name Subscriber Birth Date Member ID       TIFFANI LOPEZ 1970 [id number]                 Emergency Contacts      (Rel.) Home Phone Work Phone Mobile Phone    Chidi Morrell (Significant Other) [phone] -- [phone]               History & Physical      WILD Lyle at 8/23/2017  8:50 AM                      Electrophysiology History & Physical    Date of Hospital Visit: 8/22/2017 11:56 PM  Encounter Provider: WILD Lyle  Place of Service: [facility] CARDIOLOGY  Patient " "Name: Tiffani Fiore  :1970  Referral Provider: Ani Cazares MD      Chief complaint: ICD shocks        History of Present Illness:    This is a 46 y.o. female patient of Dr. Yancey's, who I last saw in the office on 17 for routine follow up. She has a past medical history of nonischemic cardiomyopathy, paroxysmal atrial tachycardia, DVT/PE (, was on warfarin for a year), MI (No obstructive disease or intervention), CHFand nonspecific IVCD with left bundle-branch block morphology.  She  is status post CRT-D placement.  She had her initial device placed in [place] in 2012. She also says that she had an EPS/Ablation for VT in [place] (we do not have those records). She moved to [place] and saw Dr. Yancey for the first time in 2014.  Over the period of about a year she had episodes of congestive heart failure, decreased LV systolic function, rise in LV lead threshold, phrenic nerve stimulation and loss of LV capture despite good medical therapy and trying to do optimal programming of her device. She underwent CRT-D generator change, new LV lead placement and partial extraction of existing LV lead on November 15, 2015.  Her most recent echocardiogram was in 2016 which showed no significant changes since 2014.  It showed moderate global hypokinesis, ejection fraction 38%, mild MR, trace TR, trace MA in an RVSP of 18 mmHg. She presented to the ER on 2016 with complaints of ICD shocks. She had been participating in a \"flash mob\" dance and it shocked her 5 times. It was interrogated in the ER and indeed it had shocker her 5 times for sinus tachycardia at 191 bpm. Her VT rate cutoff was set at 190 bpm. Her rate cutoff and duration were increased and she has had no further shocks until yesterday. She does have ATR episodes which are typically short in duration.    She presented to the ED with complaints of her ICD shocking her twice. She says that she was at " work and was walking out of and office and her device shocked her. Just prior to that when she was sitting in the office she felt her heart start racing and said she got really hot. The ICD shocked her once and she sat down and then it shocked her again. She did not have any chest pain, shortness of breath and did not feel like she was going to pass out. In the ER the device was remotely interrogated and confirmed VT episodes so she was admitted for further evaluation. She has not had any further episodes since admission. Prior to admission she was doing well with no complaints.           Past Medical History:   Diagnosis Date   • Cardiomyopathy    • Chest pain    • CHF (congestive heart failure)    • Depression    • DVT (deep venous thrombosis)    • Dyspnea    • Fatigue    • Hyperlipidemia    • Migraine    • Myocardial infarction    • Palpitations    • Pulmonary embolism    • Tachycardia          Past Surgical History:   Procedure Laterality Date   • CARDIAC DEFIBRILLATOR PLACEMENT  11/15/2015   •  SECTION     • HYSTERECTOMY     • TUBAL ABDOMINAL LIGATION         Family History   Problem Relation Age of Onset   • Diabetes Father    • Cancer Father      lung       Prescriptions Prior to Admission   Medication Sig Dispense Refill Last Dose   • aspirin 81 MG tablet Take  by mouth daily.   Taking   • carvedilol (COREG) 25 MG tablet Take  by mouth 2 (two) times a day.   Taking   • lisinopril (PRINIVIL,ZESTRIL) 2.5 MG tablet Take  by mouth daily.   Taking       Current Meds  No current facility-administered medications on file prior to encounter.      Current Outpatient Prescriptions on File Prior to Encounter   Medication Sig Dispense Refill   • aspirin 81 MG tablet Take  by mouth daily.     • carvedilol (COREG) 25 MG tablet Take  by mouth 2 (two) times a day.     • lisinopril (PRINIVIL,ZESTRIL) 2.5 MG tablet Take  by mouth daily.           Social History     Social History   • Marital status: Single     Spouse  "name: N/A   • Number of children: N/A   • Years of education: N/A     Occupational History   • Not on file.     Social History Main Topics   • Smoking status: Former Smoker   • Smokeless tobacco: Not on file   • Alcohol use No   • Drug use: No   • Sexual activity: Not on file     Other Topics Concern   • Not on file     Social History Narrative         REVIEW OF SYSTEMS: All systems reviewed. Pertinent positives identified in HPI. All other systems are negative.     12 point ROS was performed and is negative except as outlined in HPI           Objective:     Vitals:    08/23/17 0329 08/23/17 0330 08/23/17 0706 08/23/17 1505   BP:  95/66 105/68 100/58   BP Location:   Right arm Right arm   Patient Position:   Lying Sitting   Pulse: 82 80 66 81   Resp:  16 16 16   Temp:  97.8 °F (36.6 °C) 97.6 °F (36.4 °C) 97.6 °F (36.4 °C)   TempSrc:  Oral Oral Oral   SpO2: 98% 97% 100% 97%   Weight:  194 lb (88 kg)     Height:  65\" (165.1 cm)       Body mass index is 32.28 kg/(m^2).  Flowsheet Rows         First Filed Value    Admission Height  65\" (165.1 cm) Documented at 08/22/2017 2352    Admission Weight  195 lb (88.5 kg) Documented at 08/22/2017 2352          PHYSICAL EXAM:    Vitals Reviewed.   General Appearance: No acute distress, well developed and well nourished.   Eyes: Conjunctiva and lids: No erythema, swelling, or discharge. Sclera non-icteric.   HENT: Atraumatic, normocephalic. External eyes, ears, and nose normal. No hearing loss noted. Mucous membranes normal. Lips not cyanotic. Neck supple with no tenderness.  Respiratory: No signs of respiratory distress. Respiration rhythm and depth normal.   Clear to auscultation. No rales, crackles, rhonchi, or wheezing auscultated.   Cardiovascular:  Jugular Venous Pressure: Normal  Heart Rate and Rhythm: Normal, Heart Sounds: Normal S1 and S2. No S3 or S4 noted.  Murmurs: No murmurs noted. No rubs, thrills, or gallops.   Arterial Pulses: Posterior tibialis and dorsalis pedis " pulses normal.   Lower Extremities: No edema noted.  Gastrointestinal:  Abdomen soft, non-distended, non-tender. Normal bowel sounds.   Musculoskeletal: Normal movement of extremities  Skin and Nails: General appearance normal. No pallor, cyanosis, diaphoresis. Skin temperature normal. No clubbing of fingernails.   Psychiatric: Patient alert and oriented to person, place, and time. Speech and behavior appropriate. Normal mood and affect.                                                                   Lab Review:        Results from last 7 days  Lab Units 17  1354 17  0127   SODIUM mmol/L 142 145   POTASSIUM mmol/L 4.1 3.9   CHLORIDE mmol/L 103 106   CO2 mmol/L 25.8 25.9   BUN mg/dL 16 14   CREATININE mg/dL 0.65 0.65   GLUCOSE mg/dL 116* 112*   CALCIUM mg/dL 9.3 9.4   AST (SGOT) U/L  --  18   ALT (SGPT) U/L  --  13       Results from last 7 days  Lab Units 17  0127   TROPONIN T ng/mL 0.031*       Results from last 7 days  Lab Units 17  0127   WBC 10*3/mm3 9.70   HEMOGLOBIN g/dL 14.4   HEMATOCRIT % 42.0   PLATELETS 10*3/mm3 188       Results from last 7 days  Lab Units 17  0127   INR  1.16*   APTT seconds 28.4           Results from last 7 days  Lab Units 17  0127   MAGNESIUM mg/dL 2.3                             [id number] CXR:       IMPRESSION:      No active disease by portable imaging.    17 Echo:        Interpretation Summary      · Left ventricular systolic function is severely decreased. Calculated EF = 24%. Estimated EF was in agreement with the calculated EF. Estimated EF = 24%. Normal left ventricular wall thickness noted. The left ventricular cavity is mild-to-moderately dilated. Left ventricular diastolic dysfunction is noted (grade I a w/high LAP) consistent with impaired relaxation.  · Mildly reduced right ventricular systolic function noted. Electronic lead present in the ventricle.  · Mild mitral valve regurgitation is present.  · The tricuspid valve  is abnormal. Mild tricuspid valve regurgitation is present. Estimated right ventricular systolic pressure from tricuspid regurgitation is normal (<35 mmHg).         16 Echo:          EKG:           I personally viewed and interpreted the patient's EKG/Telemetry data    Assessment:    Active Problems:    Ventricular tachycardia       Plan:       Dr. Yancey and I saw Ms. Fiore this afternoon. She has a history of NICM, s/p CRT-D. She had VT and appropriate therapy from her device. We reviewed the echo done today and he thinks her EF looks better than the calculated 24% and does not think that her heart function has deteriorated. We are going to start her on sotalol and monitor her for the next few days. If her rhythm and QT remain stable will likely discharge home on Friday afternoon.       WILD Lyle  [facility]  17  4:10 PM       Electronically signed by WILD Lyle at 2017  4:18 PM           Physician Progress Notes (last 72 hours) (Notes from 2017 11:25 AM through 2017 11:25 AM)      WILD Lyle at 2017  8:23 AM  Version 2 of 2    Attestation signed by Velma Mo MD at 2017  4:04 PM        I have reviewed the documentation above and agree.                                           Electrophysiology Follow-Up Note      Patient Name: Tiffani Fiore  Age/Sex: 47 y.o. female  : 1970  MRN: [id number]      Day of Service: 17       Chief Complaint/Follow-up: ICD shocks    Interval History: 47 year old with past medical history of nonischemic cardiomyopathy, paroxysmal atrial tachycardia, DVT/PE (, was on warfarin for a year), MI (No obstructive disease or intervention), CHFand nonspecific IVCD with left bundle-branch block morphology.  She is status post CRT-D placement.  She had her initial device placed in [place] in 2012. She also says that she had an EPS/Ablation for VT in [place] (we do not have those  records). She moved to [place] and saw Dr. Yancey for the first time in October 2014. She had multiple CHF episodes over a year despite trying to optimize her CRT device so in 11/2015 she underwent generator change and new LV lead and has done well since that time. In 9/2016 she did receive inappropriate shocks for ST, her parameters were adjusted. She presented to ED on 8/22 with ICD shock x 2. She was symptomatic with dizziness, nausea and diaphoresis but did not pass out. Prior to admission she had been feeling well with no complaints.     S: Feels good this morning She had more VT yesterday afternoon and did receive 1 shock.       Temp:  [97.5 °F (36.4 °C)-98.1 °F (36.7 °C)] 97.5 °F (36.4 °C)  Heart Rate:  [65-81] 68  Resp:  [16] 16  BP: ()/(53-58) 86/58     PHYSICAL EXAM:    General Appearance: No acute distress, well developed and well nourished.   Eyes: Conjunctiva and lids: No erythema, swelling, or discharge. Sclera non-icteric.   HENT: Atraumatic, normocephalic. External eyes, ears, and nose normal. No hearing loss noted. Mucous membranes normal. Lips not cyanotic. Neck supple with no tenderness.  Respiratory: No signs of respiratory distress. Respiration rhythm and depth normal.   Clear to auscultation. No rales, crackles, rhonchi, or wheezing auscultated.   Cardiovascular:  Jugular Venous Pressure: Normal  Heart Rate and Rhythm: Normal, Heart Sounds: Normal S1 and S2. No S3 or S4 noted.  Murmurs: No murmurs noted. No rubs, thrills, or gallops.   Arterial Pulses: Posterior tibialis and dorsalis pedis pulses normal.   Lower Extremities: No edema noted.  Gastrointestinal:  Abdomen soft, non-distended, non-tender. Normal bowel sounds.  Musculoskeletal: Normal movement of extremities  Skin and Nails: General appearance normal. No pallor, cyanosis, diaphoresis. Skin temperature normal. No clubbing of fingernails.   Psychiatric: Patient alert and oriented to person, place, and time. Speech and behavior  appropriate. Normal mood and affect.       ECG/TELE:                           Results from last 7 days  Lab Units 08/24/17  0342 08/23/17  1354 08/23/17  0127   SODIUM mmol/L 142 142 145   POTASSIUM mmol/L 3.9 4.1 3.9   CHLORIDE mmol/L 105 103 106   CO2 mmol/L 24.3 25.8 25.9   BUN mg/dL 14 16 14   CREATININE mg/dL 0.72 0.65 0.65   GLUCOSE mg/dL 99 116* 112*   CALCIUM mg/dL 8.9 9.3 9.4       Results from last 7 days  Lab Units 08/23/17  0127   WBC 10*3/mm3 9.70   HEMOGLOBIN g/dL 14.4   HEMATOCRIT % 42.0   PLATELETS 10*3/mm3 188       Results from last 7 days  Lab Units 08/23/17  0127   INR  1.16*       Results from last 7 days  Lab Units 08/23/17  0127   TROPONIN T ng/mL 0.031*             Current Medications:   Scheduled Meds:  amiodarone 150 mg Intravenous Once   aspirin 81 mg Oral Daily   carvedilol 6.25 mg Oral BID   lisinopril 2.5 mg Oral Daily   sotalol 120 mg Oral Q12H     Continuous Infusions:       Active Problems:    Ventricular tachycardia       Plan:     NICM/VT, multiple morphology's. Sotalol started yesterday afternoon, she did have VT ('s) 8/23 afternoon, device delivered ATP therapy that was unsuccessful and then delivered a shock with successful conversion. She then had another episodes of WCT HR only 150's (different morphology), she was given 150mg bolus of IV amio and did convert, she has had no further arrhythmias. Reviewed with Dr. Yancey and Dr. Mo. QT okay, continue with sotalol. Blood pressure low this am, will stop carvedilol.       Maci Menchaca, WILD  08/24/17  8:23 AM       Electronically signed by Velma Mo MD at 8/24/2017  4:04 PM

## 2017-08-25 NOTE — PLAN OF CARE
Problem: Patient Care Overview (Adult)  Goal: Plan of Care Review  Outcome: Ongoing (interventions implemented as appropriate)    08/25/17 0342   Coping/Psychosocial Response Interventions   Plan Of Care Reviewed With patient   Patient Care Overview   Progress improving   Outcome Evaluation   Outcome Summary/Follow up Plan VSS. Pt remains in paced rhythm, no VT this shift. Sotalol scheduled to be 8/8 so next dose at 0730 today. Plan is to d/c home today.

## 2017-08-25 NOTE — DISCHARGE SUMMARY
DISCHARGE NOTE    Patient Name: Tiffani Fiore  Age/Sex: 47 y.o. female  : 1970  MRN: [id number]    Date of Discharge:  2017   Date of Admit: 2017  Encounter Provider: WILD Lyle  Place of Service: [facility] CARDIOLOGY  Patient Care Team:  No Known Provider as PCP - General  No Known Provider as PCP - Family Medicine    Subjective:     Discharge Diagnosis:  Active Problems:    Ventricular tachycardia      Hospital Course:     47 year old with past medical history of nonischemic cardiomyopathy, paroxysmal atrial tachycardia, DVT/PE (, was on warfarin for a year), MI (No obstructive disease or intervention), CHFand nonspecific IVCD with left bundle-branch block morphology.  She is status post CRT-D placement.  She had her initial device placed in [place] in 2012. She also says that she had an EPS/Ablation for VT in [place] (we do not have those records). She moved to [place] and saw Dr. Yancey for the first time in 2014. She had multiple CHF episodes over a year despite trying to optimize her CRT device so in 2015 she underwent generator change and new LV lead and has done well since that time. In 2016 she did receive inappropriate shocks for ST, her parameters were adjusted. She presented to ED on  with ICD shock x 2. She was symptomatic with dizziness, nausea and diaphoresis but did not pass out. Prior to admission she had been feeling well with no complaints. She had more VT and 1 ICD shock on . We started her on sotalol. She has had no more VT and her QT is okay. Her blood pressure was low so we stopped her carvediolol but have continued her low dose lisinopril. She has tolerated this without difficulty. We will see her in the office next week and check her ICD at that time as well.       Vital Signs  Temp:  [97.2 °F (36.2  °C)-98.2 °F (36.8 °C)] 97.2 °F (36.2 °C)  Heart Rate:  [64-78] 78  Resp:  [16-18] 18  BP: ()/(49-71) 105/55    Intake/Output Summary (Last 24 hours) at 08/25/17 1250  Last data filed at 08/25/17 1146   Gross per 24 hour   Intake              480 ml   Output             1850 ml   Net            -1370 ml       Physical Exam:  Physical Exam   Constitutional: She is oriented to person, place, and time. She appears well-developed and well-nourished. No distress.   HENT:   Head: Normocephalic and atraumatic.   Eyes: Conjunctivae are normal. No scleral icterus.   Neck: Neck supple. No JVD present.   Cardiovascular: Normal rate, regular rhythm and normal heart sounds.    Pulmonary/Chest: Effort normal and breath sounds normal. No respiratory distress.   Abdominal: Soft.   Musculoskeletal: Normal range of motion. She exhibits no edema.   Neurological: She is alert and oriented to person, place, and time.   Skin: Skin is warm and dry.   Psychiatric: She has a normal mood and affect. Her behavior is normal.   Vitals reviewed.       Labs:     Results from last 7 days  Lab Units 08/25/17  0339 08/24/17  0342 08/23/17  1354 08/23/17  0127   SODIUM mmol/L 143 142 142 145   POTASSIUM mmol/L 4.1 3.9 4.1 3.9   CHLORIDE mmol/L 106 105 103 106   CO2 mmol/L 22.2 24.3 25.8 25.9   BUN mg/dL 16 14 16 14   CREATININE mg/dL 0.74 0.72 0.65 0.65   GLUCOSE mg/dL 92 99 116* 112*   CALCIUM mg/dL 8.5* 8.9 9.3 9.4   AST (SGOT) U/L  --   --   --  18   ALT (SGPT) U/L  --   --   --  13       Results from last 7 days  Lab Units 08/23/17  0127   TROPONIN T ng/mL 0.031*       Results from last 7 days  Lab Units 08/23/17  0127   WBC 10*3/mm3 9.70   HEMOGLOBIN g/dL 14.4   HEMATOCRIT % 42.0   PLATELETS 10*3/mm3 188       Results from last 7 days  Lab Units 08/23/17  0127   INR  1.16*   APTT seconds 28.4       Results from last 7 days  Lab Units 08/23/17  0127   MAGNESIUM mg/dL 2.3                   Discharge Diet:         Dietary Orders             Start     Ordered    08/23/17 0909  Diet Regular  Diet Effective Now     Question:  Diet Texture / Consistency  Answer:  Regular    08/23/17 0908            Activity at Discharge:  Normal, no restrictions    Discharge Medications   Tiffani Fiore   Home Medication Instructions ROBERTH:[id number]    Printed on:08/25/17 1250   Medication Information                      aspirin 81 MG tablet  Take  by mouth daily.             lisinopril (PRINIVIL,ZESTRIL) 2.5 MG tablet  Take  by mouth daily.             sotalol (BETAPACE) 120 MG tablet  Take 1 tablet by mouth Every 12 (Twelve) Hours.                 Discharge disposition: home    Follow-up Appointments    Future Appointments  Date Time Provider Department Center   8/31/2017 9:40 AM WILD Lyle CD [facility] None   11/22/2017 12:00 AM WILDER REMOTE, BRIDGET DOWNEY CD [facility] None   6/22/2018 8:30 AM WILDER IN OFFICE, BRIDGET DOWNEY CD [facility] None   6/22/2018 9:00 AM MD SHAYAN Rees CD [facility] None         WILD Lyle  08/25/17  12:50 PM

## 2017-08-27 ENCOUNTER — HOSPITAL ENCOUNTER (INPATIENT)
Facility: HOSPITAL | Age: 47
LOS: 5 days | Discharge: HOME | End: 2017-09-01
Attending: EMERGENCY MEDICINE | Admitting: INTERNAL MEDICINE

## 2017-08-27 ENCOUNTER — APPOINTMENT (OUTPATIENT)
Dept: GENERAL RADIOLOGY | Facility: HOSPITAL | Age: 47
End: 2017-08-27

## 2017-08-27 DIAGNOSIS — I47.20 VENTRICULAR TACHYCARDIA (HCC): Primary | ICD-10-CM

## 2017-08-27 LAB
ALBUMIN SERPL-MCNC: 3.8 G/DL (ref 3.5–5.2)
ALBUMIN/GLOB SERPL: 1.5 G/DL
ALP SERPL-CCNC: 96 U/L (ref 39–117)
ALT SERPL W P-5'-P-CCNC: 29 U/L (ref 1–33)
ANION GAP SERPL CALCULATED.3IONS-SCNC: 16.6 MMOL/L
APTT PPP: 30.1 SECONDS (ref 22.7–35.4)
AST SERPL-CCNC: 31 U/L (ref 1–32)
BASOPHILS # BLD AUTO: 0.02 10*3/MM3 (ref 0–0.2)
BASOPHILS NFR BLD AUTO: 0.2 % (ref 0–1.5)
BILIRUB SERPL-MCNC: 0.4 MG/DL (ref 0.1–1.2)
BUN BLD-MCNC: 12 MG/DL (ref 6–20)
BUN/CREAT SERPL: 14.8 (ref 7–25)
CALCIUM SPEC-SCNC: 8.4 MG/DL (ref 8.6–10.5)
CHLORIDE SERPL-SCNC: 107 MMOL/L (ref 98–107)
CO2 SERPL-SCNC: 16.4 MMOL/L (ref 22–29)
CREAT BLD-MCNC: 0.81 MG/DL (ref 0.57–1)
DEPRECATED RDW RBC AUTO: 39.7 FL (ref 37–54)
EOSINOPHIL # BLD AUTO: 0.11 10*3/MM3 (ref 0–0.7)
EOSINOPHIL NFR BLD AUTO: 1.2 % (ref 0.3–6.2)
ERYTHROCYTE [DISTWIDTH] IN BLOOD BY AUTOMATED COUNT: 12.2 % (ref 11.7–13)
GFR SERPL CREATININE-BSD FRML MDRD: 76 ML/MIN/1.73
GLOBULIN UR ELPH-MCNC: 2.6 GM/DL
GLUCOSE BLD-MCNC: 198 MG/DL (ref 65–99)
HCT VFR BLD AUTO: 38.7 % (ref 35.6–45.5)
HGB BLD-MCNC: 13.2 G/DL (ref 11.9–15.5)
IMM GRANULOCYTES # BLD: 0.02 10*3/MM3 (ref 0–0.03)
IMM GRANULOCYTES NFR BLD: 0.2 % (ref 0–0.5)
INR PPP: 1.21 (ref 0.9–1.1)
LYMPHOCYTES # BLD AUTO: 2.82 10*3/MM3 (ref 0.9–4.8)
LYMPHOCYTES NFR BLD AUTO: 30.2 % (ref 19.6–45.3)
MAGNESIUM SERPL-MCNC: 2 MG/DL (ref 1.6–2.6)
MCH RBC QN AUTO: 30.6 PG (ref 26.9–32)
MCHC RBC AUTO-ENTMCNC: 34.1 G/DL (ref 32.4–36.3)
MCV RBC AUTO: 89.6 FL (ref 80.5–98.2)
MONOCYTES # BLD AUTO: 0.41 10*3/MM3 (ref 0.2–1.2)
MONOCYTES NFR BLD AUTO: 4.4 % (ref 5–12)
NEUTROPHILS # BLD AUTO: 5.97 10*3/MM3 (ref 1.9–8.1)
NEUTROPHILS NFR BLD AUTO: 63.8 % (ref 42.7–76)
NT-PROBNP SERPL-MCNC: 590.8 PG/ML (ref 0–450)
PLATELET # BLD AUTO: 229 10*3/MM3 (ref 140–500)
PMV BLD AUTO: 10.5 FL (ref 6–12)
POTASSIUM BLD-SCNC: 4.2 MMOL/L (ref 3.5–5.2)
PROT SERPL-MCNC: 6.4 G/DL (ref 6–8.5)
PROTHROMBIN TIME: 14.9 SECONDS (ref 11.7–14.2)
RBC # BLD AUTO: 4.32 10*6/MM3 (ref 3.9–5.2)
SODIUM BLD-SCNC: 140 MMOL/L (ref 136–145)
T4 FREE SERPL-MCNC: 1.26 NG/DL (ref 0.93–1.7)
TROPONIN T SERPL-MCNC: <0.01 NG/ML (ref 0–0.03)
TSH SERPL DL<=0.05 MIU/L-ACNC: 2.14 MIU/ML (ref 0.27–4.2)
WBC NRBC COR # BLD: 9.35 10*3/MM3 (ref 4.5–10.7)

## 2017-08-27 PROCEDURE — 83880 ASSAY OF NATRIURETIC PEPTIDE: CPT | Performed by: EMERGENCY MEDICINE

## 2017-08-27 PROCEDURE — 93010 ELECTROCARDIOGRAM REPORT: CPT | Performed by: INTERNAL MEDICINE

## 2017-08-27 PROCEDURE — 84443 ASSAY THYROID STIM HORMONE: CPT | Performed by: INTERNAL MEDICINE

## 2017-08-27 PROCEDURE — 83735 ASSAY OF MAGNESIUM: CPT | Performed by: EMERGENCY MEDICINE

## 2017-08-27 PROCEDURE — 93005 ELECTROCARDIOGRAM TRACING: CPT | Performed by: EMERGENCY MEDICINE

## 2017-08-27 PROCEDURE — 25010000002 MAGNESIUM SULFATE IN D5W 1G/100ML (PREMIX) 1-5 GM/100ML-% SOLUTION: Performed by: INTERNAL MEDICINE

## 2017-08-27 PROCEDURE — 25010000002 MIDAZOLAM PER 1 MG

## 2017-08-27 PROCEDURE — 25010000002 AMIODARONE IN DEXTROSE 5% 360-4.14 MG/200ML-% SOLUTION: Performed by: INTERNAL MEDICINE

## 2017-08-27 PROCEDURE — 5A2204Z RESTORATION OF CARDIAC RHYTHM, SINGLE: ICD-10-PCS | Performed by: EMERGENCY MEDICINE

## 2017-08-27 PROCEDURE — 80053 COMPREHEN METABOLIC PANEL: CPT | Performed by: EMERGENCY MEDICINE

## 2017-08-27 PROCEDURE — 25010000002 AMIODARONE IN DEXTROSE 5% 150-4.21 MG/100ML-% SOLUTION

## 2017-08-27 PROCEDURE — 71010 HC CHEST PA OR AP: CPT

## 2017-08-27 PROCEDURE — 99223 1ST HOSP IP/OBS HIGH 75: CPT | Performed by: INTERNAL MEDICINE

## 2017-08-27 PROCEDURE — 25010000002 AMIODARONE IN DEXTROSE 5% 360-4.14 MG/200ML-% SOLUTION

## 2017-08-27 PROCEDURE — 85025 COMPLETE CBC W/AUTO DIFF WBC: CPT | Performed by: EMERGENCY MEDICINE

## 2017-08-27 PROCEDURE — 84484 ASSAY OF TROPONIN QUANT: CPT | Performed by: EMERGENCY MEDICINE

## 2017-08-27 PROCEDURE — 84439 ASSAY OF FREE THYROXINE: CPT | Performed by: INTERNAL MEDICINE

## 2017-08-27 PROCEDURE — 85610 PROTHROMBIN TIME: CPT | Performed by: EMERGENCY MEDICINE

## 2017-08-27 PROCEDURE — 85730 THROMBOPLASTIN TIME PARTIAL: CPT | Performed by: EMERGENCY MEDICINE

## 2017-08-27 PROCEDURE — 99285 EMERGENCY DEPT VISIT HI MDM: CPT

## 2017-08-27 RX ORDER — ONDANSETRON 2 MG/ML
4 INJECTION INTRAMUSCULAR; INTRAVENOUS EVERY 6 HOURS PRN
Status: DISCONTINUED | OUTPATIENT
Start: 2017-08-27 | End: 2017-09-01 | Stop reason: HOSPADM

## 2017-08-27 RX ORDER — MIDAZOLAM HYDROCHLORIDE 1 MG/ML
INJECTION INTRAMUSCULAR; INTRAVENOUS
Status: COMPLETED
Start: 2017-08-27 | End: 2017-08-27

## 2017-08-27 RX ORDER — MIDAZOLAM HYDROCHLORIDE 1 MG/ML
2 INJECTION INTRAMUSCULAR; INTRAVENOUS ONCE
Status: COMPLETED | OUTPATIENT
Start: 2017-08-27 | End: 2017-08-27

## 2017-08-27 RX ORDER — SODIUM CHLORIDE 0.9 % (FLUSH) 0.9 %
1-10 SYRINGE (ML) INJECTION AS NEEDED
Status: DISCONTINUED | OUTPATIENT
Start: 2017-08-27 | End: 2017-08-29 | Stop reason: SDUPTHER

## 2017-08-27 RX ORDER — ASPIRIN 81 MG/1
81 TABLET ORAL DAILY
Status: DISCONTINUED | OUTPATIENT
Start: 2017-08-27 | End: 2017-09-01 | Stop reason: HOSPADM

## 2017-08-27 RX ORDER — ALPRAZOLAM 0.25 MG/1
0.25 TABLET ORAL 4 TIMES DAILY PRN
Status: DISCONTINUED | OUTPATIENT
Start: 2017-08-27 | End: 2017-09-01 | Stop reason: HOSPADM

## 2017-08-27 RX ORDER — SODIUM CHLORIDE 0.9 % (FLUSH) 0.9 %
10 SYRINGE (ML) INJECTION AS NEEDED
Status: DISCONTINUED | OUTPATIENT
Start: 2017-08-27 | End: 2017-09-01 | Stop reason: HOSPADM

## 2017-08-27 RX ORDER — SODIUM CHLORIDE 0.9 % (FLUSH) 0.9 %
1-10 SYRINGE (ML) INJECTION AS NEEDED
Status: DISCONTINUED | OUTPATIENT
Start: 2017-08-27 | End: 2017-09-01 | Stop reason: HOSPADM

## 2017-08-27 RX ORDER — ZOLPIDEM TARTRATE 5 MG/1
5 TABLET ORAL NIGHTLY PRN
Status: DISCONTINUED | OUTPATIENT
Start: 2017-08-27 | End: 2017-09-01 | Stop reason: HOSPADM

## 2017-08-27 RX ORDER — ACETAMINOPHEN 325 MG/1
650 TABLET ORAL EVERY 6 HOURS PRN
Status: DISCONTINUED | OUTPATIENT
Start: 2017-08-27 | End: 2017-09-01 | Stop reason: HOSPADM

## 2017-08-27 RX ORDER — MAGNESIUM SULFATE 1 G/100ML
2 INJECTION INTRAVENOUS ONCE
Status: COMPLETED | OUTPATIENT
Start: 2017-08-27 | End: 2017-08-27

## 2017-08-27 RX ORDER — NITROGLYCERIN 0.4 MG/1
0.4 TABLET SUBLINGUAL
Status: DISCONTINUED | OUTPATIENT
Start: 2017-08-27 | End: 2017-09-01 | Stop reason: HOSPADM

## 2017-08-27 RX ORDER — LISINOPRIL 2.5 MG/1
2.5 TABLET ORAL DAILY
Status: DISCONTINUED | OUTPATIENT
Start: 2017-08-27 | End: 2017-09-01 | Stop reason: HOSPADM

## 2017-08-27 RX ORDER — HYDROMORPHONE HYDROCHLORIDE 1 MG/ML
0.5 INJECTION, SOLUTION INTRAMUSCULAR; INTRAVENOUS; SUBCUTANEOUS
Status: DISCONTINUED | OUTPATIENT
Start: 2017-08-27 | End: 2017-09-01 | Stop reason: HOSPADM

## 2017-08-27 RX ADMIN — AMIODARONE HYDROCHLORIDE 1 MG/MIN: 1.8 INJECTION, SOLUTION INTRAVENOUS at 16:29

## 2017-08-27 RX ADMIN — AMIODARONE HYDROCHLORIDE 1 MG/MIN: 1.8 INJECTION, SOLUTION INTRAVENOUS at 10:27

## 2017-08-27 RX ADMIN — AMIODARONE HYDROCHLORIDE 150 MG: 1.5 INJECTION, SOLUTION INTRAVENOUS at 10:15

## 2017-08-27 RX ADMIN — MAGNESIUM SULFATE HEPTAHYDRATE 2 G: 1 INJECTION, SOLUTION INTRAVENOUS at 16:50

## 2017-08-27 RX ADMIN — ASPIRIN 81 MG: 81 TABLET ORAL at 18:24

## 2017-08-27 RX ADMIN — ACETAMINOPHEN 650 MG: 325 TABLET ORAL at 16:39

## 2017-08-27 RX ADMIN — MIDAZOLAM HYDROCHLORIDE 2 MG: 1 INJECTION, SOLUTION INTRAMUSCULAR; INTRAVENOUS at 10:23

## 2017-08-27 RX ADMIN — MIDAZOLAM HYDROCHLORIDE 2 MG: 1 INJECTION INTRAMUSCULAR; INTRAVENOUS at 10:23

## 2017-08-27 RX ADMIN — AMIODARONE HYDROCHLORIDE 1 MG/MIN: 1.8 INJECTION, SOLUTION INTRAVENOUS at 22:39

## 2017-08-27 RX ADMIN — SODIUM CHLORIDE 1000 ML: 9 INJECTION, SOLUTION INTRAVENOUS at 10:30

## 2017-08-28 LAB
ANION GAP SERPL CALCULATED.3IONS-SCNC: 12.4 MMOL/L
BUN BLD-MCNC: 9 MG/DL (ref 6–20)
BUN/CREAT SERPL: 14.5 (ref 7–25)
CALCIUM SPEC-SCNC: 8.5 MG/DL (ref 8.6–10.5)
CHLORIDE SERPL-SCNC: 108 MMOL/L (ref 98–107)
CO2 SERPL-SCNC: 23.6 MMOL/L (ref 22–29)
CREAT BLD-MCNC: 0.62 MG/DL (ref 0.57–1)
GFR SERPL CREATININE-BSD FRML MDRD: 103 ML/MIN/1.73
GLUCOSE BLD-MCNC: 102 MG/DL (ref 65–99)
MAGNESIUM SERPL-MCNC: 2.4 MG/DL (ref 1.6–2.6)
POTASSIUM BLD-SCNC: 4 MMOL/L (ref 3.5–5.2)
SODIUM BLD-SCNC: 144 MMOL/L (ref 136–145)
TROPONIN T SERPL-MCNC: 1.54 NG/ML (ref 0–0.03)
TROPONIN T SERPL-MCNC: 1.56 NG/ML (ref 0–0.03)

## 2017-08-28 PROCEDURE — 99233 SBSQ HOSP IP/OBS HIGH 50: CPT | Performed by: INTERNAL MEDICINE

## 2017-08-28 PROCEDURE — 84484 ASSAY OF TROPONIN QUANT: CPT | Performed by: INTERNAL MEDICINE

## 2017-08-28 PROCEDURE — 80048 BASIC METABOLIC PNL TOTAL CA: CPT | Performed by: INTERNAL MEDICINE

## 2017-08-28 PROCEDURE — 93010 ELECTROCARDIOGRAM REPORT: CPT | Performed by: INTERNAL MEDICINE

## 2017-08-28 PROCEDURE — 93005 ELECTROCARDIOGRAM TRACING: CPT | Performed by: INTERNAL MEDICINE

## 2017-08-28 PROCEDURE — 83735 ASSAY OF MAGNESIUM: CPT | Performed by: INTERNAL MEDICINE

## 2017-08-28 PROCEDURE — 25010000002 AMIODARONE IN DEXTROSE 5% 360-4.14 MG/200ML-% SOLUTION: Performed by: INTERNAL MEDICINE

## 2017-08-28 RX ORDER — AMIODARONE HYDROCHLORIDE 200 MG/1
300 TABLET ORAL EVERY 12 HOURS SCHEDULED
Status: DISCONTINUED | OUTPATIENT
Start: 2017-08-28 | End: 2017-08-28

## 2017-08-28 RX ORDER — AMIODARONE HYDROCHLORIDE 200 MG/1
400 TABLET ORAL 3 TIMES DAILY
Status: DISCONTINUED | OUTPATIENT
Start: 2017-08-28 | End: 2017-09-01 | Stop reason: HOSPADM

## 2017-08-28 RX ADMIN — AMIODARONE HYDROCHLORIDE 300 MG: 200 TABLET ORAL at 11:01

## 2017-08-28 RX ADMIN — ASPIRIN 81 MG: 81 TABLET ORAL at 08:33

## 2017-08-28 RX ADMIN — AMIODARONE HYDROCHLORIDE 1 MG/MIN: 1.8 INJECTION, SOLUTION INTRAVENOUS at 04:41

## 2017-08-28 RX ADMIN — AMIODARONE HYDROCHLORIDE 400 MG: 200 TABLET ORAL at 20:51

## 2017-08-29 PROCEDURE — 99233 SBSQ HOSP IP/OBS HIGH 50: CPT | Performed by: INTERNAL MEDICINE

## 2017-08-29 PROCEDURE — 93010 ELECTROCARDIOGRAM REPORT: CPT | Performed by: INTERNAL MEDICINE

## 2017-08-29 PROCEDURE — 93005 ELECTROCARDIOGRAM TRACING: CPT | Performed by: INTERNAL MEDICINE

## 2017-08-29 RX ADMIN — ASPIRIN 81 MG: 81 TABLET ORAL at 09:56

## 2017-08-29 RX ADMIN — AMIODARONE HYDROCHLORIDE 400 MG: 200 TABLET ORAL at 17:37

## 2017-08-29 RX ADMIN — AMIODARONE HYDROCHLORIDE 400 MG: 200 TABLET ORAL at 21:23

## 2017-08-29 RX ADMIN — AMIODARONE HYDROCHLORIDE 400 MG: 200 TABLET ORAL at 09:56

## 2017-08-30 PROCEDURE — C1894 INTRO/SHEATH, NON-LASER: HCPCS | Performed by: INTERNAL MEDICINE

## 2017-08-30 PROCEDURE — 4A023N7 MEASUREMENT OF CARDIAC SAMPLING AND PRESSURE, LEFT HEART, PERCUTANEOUS APPROACH: ICD-10-PCS | Performed by: INTERNAL MEDICINE

## 2017-08-30 PROCEDURE — B2111ZZ FLUOROSCOPY OF MULTIPLE CORONARY ARTERIES USING LOW OSMOLAR CONTRAST: ICD-10-PCS | Performed by: INTERNAL MEDICINE

## 2017-08-30 PROCEDURE — 0 IOPAMIDOL PER 1 ML: Performed by: INTERNAL MEDICINE

## 2017-08-30 PROCEDURE — 25010000002 MIDAZOLAM PER 1 MG: Performed by: INTERNAL MEDICINE

## 2017-08-30 PROCEDURE — 93458 L HRT ARTERY/VENTRICLE ANGIO: CPT | Performed by: INTERNAL MEDICINE

## 2017-08-30 PROCEDURE — 25010000002 HEPARIN (PORCINE) PER 1000 UNITS: Performed by: INTERNAL MEDICINE

## 2017-08-30 PROCEDURE — 93005 ELECTROCARDIOGRAM TRACING: CPT | Performed by: INTERNAL MEDICINE

## 2017-08-30 PROCEDURE — 93010 ELECTROCARDIOGRAM REPORT: CPT | Performed by: INTERNAL MEDICINE

## 2017-08-30 PROCEDURE — 25010000002 FENTANYL CITRATE (PF) 100 MCG/2ML SOLUTION: Performed by: INTERNAL MEDICINE

## 2017-08-30 PROCEDURE — 99152 MOD SED SAME PHYS/QHP 5/>YRS: CPT | Performed by: INTERNAL MEDICINE

## 2017-08-30 PROCEDURE — 99233 SBSQ HOSP IP/OBS HIGH 50: CPT | Performed by: INTERNAL MEDICINE

## 2017-08-30 PROCEDURE — B2151ZZ FLUOROSCOPY OF LEFT HEART USING LOW OSMOLAR CONTRAST: ICD-10-PCS | Performed by: INTERNAL MEDICINE

## 2017-08-30 PROCEDURE — C1769 GUIDE WIRE: HCPCS | Performed by: INTERNAL MEDICINE

## 2017-08-30 RX ORDER — SODIUM CHLORIDE 9 MG/ML
INJECTION, SOLUTION INTRAVENOUS CONTINUOUS PRN
Status: DISCONTINUED | OUTPATIENT
Start: 2017-08-30 | End: 2017-08-30 | Stop reason: HOSPADM

## 2017-08-30 RX ORDER — FENTANYL CITRATE 50 UG/ML
INJECTION, SOLUTION INTRAMUSCULAR; INTRAVENOUS AS NEEDED
Status: DISCONTINUED | OUTPATIENT
Start: 2017-08-30 | End: 2017-08-30 | Stop reason: HOSPADM

## 2017-08-30 RX ORDER — ONDANSETRON 2 MG/ML
4 INJECTION INTRAMUSCULAR; INTRAVENOUS EVERY 6 HOURS PRN
Status: CANCELLED | OUTPATIENT
Start: 2017-08-30

## 2017-08-30 RX ORDER — ONDANSETRON 4 MG/1
4 TABLET, ORALLY DISINTEGRATING ORAL EVERY 6 HOURS PRN
Status: CANCELLED | OUTPATIENT
Start: 2017-08-30

## 2017-08-30 RX ORDER — MIDAZOLAM HYDROCHLORIDE 1 MG/ML
INJECTION INTRAMUSCULAR; INTRAVENOUS AS NEEDED
Status: DISCONTINUED | OUTPATIENT
Start: 2017-08-30 | End: 2017-08-30 | Stop reason: HOSPADM

## 2017-08-30 RX ORDER — LIDOCAINE HYDROCHLORIDE 20 MG/ML
INJECTION, SOLUTION INFILTRATION; PERINEURAL AS NEEDED
Status: DISCONTINUED | OUTPATIENT
Start: 2017-08-30 | End: 2017-08-30 | Stop reason: HOSPADM

## 2017-08-30 RX ORDER — ONDANSETRON 4 MG/1
4 TABLET, FILM COATED ORAL EVERY 6 HOURS PRN
Status: CANCELLED | OUTPATIENT
Start: 2017-08-30

## 2017-08-30 RX ORDER — HYDROCODONE BITARTRATE AND ACETAMINOPHEN 5; 325 MG/1; MG/1
1 TABLET ORAL EVERY 4 HOURS PRN
Status: DISCONTINUED | OUTPATIENT
Start: 2017-08-30 | End: 2017-09-01 | Stop reason: HOSPADM

## 2017-08-30 RX ADMIN — ASPIRIN 81 MG: 81 TABLET ORAL at 12:56

## 2017-08-30 RX ADMIN — AMIODARONE HYDROCHLORIDE 400 MG: 200 TABLET ORAL at 21:28

## 2017-08-30 RX ADMIN — AMIODARONE HYDROCHLORIDE 400 MG: 200 TABLET ORAL at 12:56

## 2017-08-30 RX ADMIN — LISINOPRIL 2.5 MG: 2.5 TABLET ORAL at 12:56

## 2017-08-31 LAB
ANION GAP SERPL CALCULATED.3IONS-SCNC: 12 MMOL/L
BUN BLD-MCNC: 12 MG/DL (ref 6–20)
BUN/CREAT SERPL: 16.2 (ref 7–25)
CALCIUM SPEC-SCNC: 9.4 MG/DL (ref 8.6–10.5)
CHLORIDE SERPL-SCNC: 102 MMOL/L (ref 98–107)
CO2 SERPL-SCNC: 26 MMOL/L (ref 22–29)
CREAT BLD-MCNC: 0.74 MG/DL (ref 0.57–1)
DEPRECATED RDW RBC AUTO: 41.4 FL (ref 37–54)
ERYTHROCYTE [DISTWIDTH] IN BLOOD BY AUTOMATED COUNT: 12.3 % (ref 11.7–13)
GFR SERPL CREATININE-BSD FRML MDRD: 84 ML/MIN/1.73
GLUCOSE BLD-MCNC: 96 MG/DL (ref 65–99)
HCT VFR BLD AUTO: 41.4 % (ref 35.6–45.5)
HGB BLD-MCNC: 13.9 G/DL (ref 11.9–15.5)
MCH RBC QN AUTO: 31.1 PG (ref 26.9–32)
MCHC RBC AUTO-ENTMCNC: 33.6 G/DL (ref 32.4–36.3)
MCV RBC AUTO: 92.6 FL (ref 80.5–98.2)
PLATELET # BLD AUTO: 214 10*3/MM3 (ref 140–500)
PMV BLD AUTO: 10.6 FL (ref 6–12)
POTASSIUM BLD-SCNC: 4.4 MMOL/L (ref 3.5–5.2)
RBC # BLD AUTO: 4.47 10*6/MM3 (ref 3.9–5.2)
SODIUM BLD-SCNC: 140 MMOL/L (ref 136–145)
WBC NRBC COR # BLD: 9.13 10*3/MM3 (ref 4.5–10.7)

## 2017-08-31 PROCEDURE — 93005 ELECTROCARDIOGRAM TRACING: CPT | Performed by: INTERNAL MEDICINE

## 2017-08-31 PROCEDURE — 99233 SBSQ HOSP IP/OBS HIGH 50: CPT | Performed by: NURSE PRACTITIONER

## 2017-08-31 PROCEDURE — 85027 COMPLETE CBC AUTOMATED: CPT | Performed by: INTERNAL MEDICINE

## 2017-08-31 PROCEDURE — 93010 ELECTROCARDIOGRAM REPORT: CPT | Performed by: INTERNAL MEDICINE

## 2017-08-31 PROCEDURE — 80048 BASIC METABOLIC PNL TOTAL CA: CPT | Performed by: INTERNAL MEDICINE

## 2017-08-31 RX ORDER — CARVEDILOL 3.12 MG/1
3.12 TABLET ORAL EVERY 12 HOURS
Status: DISCONTINUED | OUTPATIENT
Start: 2017-08-31 | End: 2017-09-01 | Stop reason: HOSPADM

## 2017-08-31 RX ADMIN — CARVEDILOL 3.12 MG: 3.12 TABLET, FILM COATED ORAL at 11:20

## 2017-08-31 RX ADMIN — AMIODARONE HYDROCHLORIDE 400 MG: 200 TABLET ORAL at 08:30

## 2017-08-31 RX ADMIN — AMIODARONE HYDROCHLORIDE 400 MG: 200 TABLET ORAL at 16:49

## 2017-08-31 RX ADMIN — ASPIRIN 81 MG: 81 TABLET ORAL at 08:30

## 2017-08-31 RX ADMIN — CARVEDILOL 3.12 MG: 3.12 TABLET, FILM COATED ORAL at 23:29

## 2017-08-31 RX ADMIN — AMIODARONE HYDROCHLORIDE 400 MG: 200 TABLET ORAL at 21:39

## 2017-09-01 VITALS
RESPIRATION RATE: 16 BRPM | BODY MASS INDEX: 32.65 KG/M2 | OXYGEN SATURATION: 95 % | DIASTOLIC BLOOD PRESSURE: 58 MMHG | SYSTOLIC BLOOD PRESSURE: 104 MMHG | TEMPERATURE: 97.7 F | HEART RATE: 80 BPM | WEIGHT: 196 LBS | HEIGHT: 65 IN

## 2017-09-01 PROCEDURE — 93010 ELECTROCARDIOGRAM REPORT: CPT | Performed by: INTERNAL MEDICINE

## 2017-09-01 PROCEDURE — 93005 ELECTROCARDIOGRAM TRACING: CPT | Performed by: INTERNAL MEDICINE

## 2017-09-01 PROCEDURE — 99238 HOSP IP/OBS DSCHRG MGMT 30/<: CPT | Performed by: NURSE PRACTITIONER

## 2017-09-01 RX ORDER — CARVEDILOL 3.12 MG/1
3.12 TABLET ORAL EVERY 12 HOURS
Qty: 60 TABLET | Refills: 6 | Status: SHIPPED | OUTPATIENT
Start: 2017-09-01 | End: 2017-09-26

## 2017-09-01 RX ORDER — AMIODARONE HYDROCHLORIDE 200 MG/1
400 TABLET ORAL DAILY
Qty: 60 TABLET | Refills: 6 | Status: SHIPPED | OUTPATIENT
Start: 2017-09-01 | End: 2017-09-26

## 2017-09-01 RX ADMIN — CARVEDILOL 3.12 MG: 3.12 TABLET, FILM COATED ORAL at 12:11

## 2017-09-01 RX ADMIN — AMIODARONE HYDROCHLORIDE 400 MG: 200 TABLET ORAL at 08:24

## 2017-09-01 RX ADMIN — LISINOPRIL 2.5 MG: 2.5 TABLET ORAL at 08:25

## 2017-09-01 RX ADMIN — ASPIRIN 81 MG: 81 TABLET ORAL at 08:25

## 2017-09-01 RX ADMIN — Medication 10 ML: at 08:25

## 2017-09-01 NOTE — DISCHARGE SUMMARY
DISCHARGE NOTE    Patient Name: Tiffani Fiore  Age/Sex: 47 y.o. female  : 1970  MRN: [id number]    Date of Discharge:  2017   Date of Admit: 2017  Encounter Provider: WILD Lyle  Place of Service: [facility] CARDIOLOGY  Patient Care Team:  No Known Provider as PCP - General  No Known Provider as PCP - Family Medicine    Subjective:     Discharge Diagnosis:  Active Problems:    Ventricular tachycardia      Hospital Course:     47 year old female patient of Dr. 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  She is status post CRT-D placement.  She had her initial device placed in [place] in 2012. She also says that she had an EPS/Ablation for VT in [place] (we do not have those records). She moved to [place] and saw Dr. Yancey for the first time in 2014. She had multiple CHF episodes over a year despite trying to optimize her CRT device so in 2015 she underwent generator change and new LV lead and has done well since that time. In 2016 she did receive inappropriate shocks for ST, her parameters were adjusted. She presented to ED on  with ICD shock x 2. She was symptomatic with dizziness, nausea and diaphoresis but did not pass out. Prior to admission she had been feeling well with no complaints. She had more VT and 1 ICD shock on . We started her on sotalol. She has had no more VT and her QT is okay and she was discharged home on 2017 on 120mg of sotalol BID. She presented to ER on 17 in sustained VT. She was symptomatic and was externally cardioverted. She was admitted, sotalol was discontinued and amiodarone was  initiated. She is tolerating the amiodarone without difficulty. She did have a short run of NSVT 8/31 am. She was symptomatic but spontaneously converted. Her ICD zones have been reprogrammed as her VT rate is slower now. Plan for discharge home today on amiodarone 400mg bid until Monday then decrease to 400mg daily. Follow up with me in 1 month.       Vital Signs  Temp:  [97.6 °F (36.4 °C)-97.9 °F (36.6 °C)] 97.7 °F (36.5 °C)  Heart Rate:  [72-98] 80  Resp:  [16-18] 16  BP: ()/(57-62) 104/58    Intake/Output Summary (Last 24 hours) at 09/01/17 1544  Last data filed at 09/01/17 1300   Gross per 24 hour   Intake              320 ml   Output             1225 ml   Net             -905 ml       Physical Exam:  Physical Exam   Constitutional: She is oriented to person, place, and time. She appears well-developed and well-nourished. No distress.   HENT:   Head: Normocephalic and atraumatic.   Eyes: Conjunctivae are normal. No scleral icterus.   Neck: Neck supple. No JVD present.   Cardiovascular: Normal rate, regular rhythm and normal heart sounds.    Pulmonary/Chest: Effort normal. No respiratory distress.   Abdominal: Soft.   Musculoskeletal: Normal range of motion. She exhibits no edema.   Neurological: She is alert and oriented to person, place, and time.   Skin: Skin is warm and dry.   Psychiatric: She has a normal mood and affect. Her behavior is normal.   Vitals reviewed.       Labs:     Results from last 7 days  Lab Units 08/31/17  0433 08/28/17  0447 08/27/17  1020   SODIUM mmol/L 140 144 140   POTASSIUM mmol/L 4.4 4.0 4.2   CHLORIDE mmol/L 102 108* 107   CO2 mmol/L 26.0 23.6 16.4*   BUN mg/dL 12 9 12   CREATININE mg/dL 0.74 0.62 0.81   GLUCOSE mg/dL 96 102* 198*   CALCIUM mg/dL 9.4 8.5* 8.4*   AST (SGOT) U/L  --   --  31   ALT (SGPT) U/L  --   --  29       Results from last 7 days  Lab Units 08/28/17  0623 08/28/17  0447 08/27/17  1020   TROPONIN T ng/mL 1.540* 1.560* <0.010       Results from last 7  days  Lab Units 08/31/17  0433 08/27/17  1020   WBC 10*3/mm3 9.13 9.35   HEMOGLOBIN g/dL 13.9 13.2   HEMATOCRIT % 41.4 38.7   PLATELETS 10*3/mm3 214 229       Results from last 7 days  Lab Units 08/27/17  1020   INR  1.21*   APTT seconds 30.1       Results from last 7 days  Lab Units 08/28/17  0447 08/27/17  1020   MAGNESIUM mg/dL 2.4 2.0           Results from last 7 days  Lab Units 08/27/17  1020   PROBNP pg/mL 590.8*       Results from last 7 days  Lab Units 08/27/17  1020   TSH mIU/mL 2.140       Discharge Diet:         Dietary Orders            Start     Ordered    08/30/17 1251  Diet Regular; Cardiac  Diet Effective Now     Question Answer Comment   Diet Texture / Consistency Regular    Common Modifiers Cardiac        08/30/17 1250            Activity at Discharge:      Discharge Medications   Tiffani Fiore   Home Medication Instructions ROBERTH:[id number]    Printed on:09/01/17 2917   Medication Information                      amiodarone (PACERONE) 200 MG tablet  Take 2 tablets by mouth Daily. She is to take 400mg BID until 9/4 then go to 400mg daily             aspirin 81 MG tablet  Take  by mouth daily.             carvedilol (COREG) 3.125 MG tablet  Take 1 tablet by mouth Every 12 (Twelve) Hours.             lisinopril (PRINIVIL,ZESTRIL) 2.5 MG tablet  Take  by mouth daily.                 Discharge disposition: home    Follow-up Appointments  Follow-up Information     Follow up with WILD Lyle Follow up in 4 week(s).    Specialty:  Cardiology    Why:  Follow-up Thursday 9/7/2017 at 10:40 in the office.    Contact information:    Chanda ORTEAG  [place] [address]  [phone]          Future Appointments  Date Time Provider Department Center   11/22/2017 12:00 AM WILDER REMOTE, JASMYNG NOAH [facility] CD [facility] None   6/22/2018 8:30 AM WILDER IN OFFICE, LCG NOAH [facility] None   6/22/2018 9:00 AM MD RUDY ReesK CD [facility] None         WILD Lyle  09/01/17  3:44 PM

## 2017-09-01 NOTE — PLAN OF CARE
Problem: Patient Care Overview (Adult)  Goal: Plan of Care Review  Outcome: Ongoing (interventions implemented as appropriate)    09/01/17 0459   Coping/Psychosocial Response Interventions   Plan Of Care Reviewed With patient   Patient Care Overview   Progress no change   Outcome Evaluation   Outcome Summary/Follow up Plan Pt rested well throughout night, hr WNL, no abnormal rhythm this shift, pt denies pain or discomfort, VSS, plan to D/C today       Goal: Discharge Needs Assessment  Outcome: Ongoing (interventions implemented as appropriate)    Problem: Arrhythmia/Dysrhythmia (Symptomatic) (Adult)  Goal: Signs and Symptoms of Listed Potential Problems Will be Absent or Manageable (Arrhythmia/Dysrhythmia)  Outcome: Ongoing (interventions implemented as appropriate)    Problem: Cardiac Catheterization with/without PCI (Adult)  Goal: Signs and Symptoms of Listed Potential Problems Will be Absent or Manageable (Cardiac Catheterization with/without PCI)  Outcome: Ongoing (interventions implemented as appropriate)

## 2017-09-01 NOTE — PROGRESS NOTES
Case Management Discharge Note    Final Note: Pt d/c home via private auto.  D/C needs denied.      Discharge Placement     No information found        Other: Other    Discharge Codes: 01  Discharge to home

## 2017-09-01 NOTE — DISCHARGE INSTRUCTIONS
"General Information - Obtained from [employer]     Description      ventricular tachyarrhythmia(1)    ventricular arrhythmia of ? 3 or more beats with wide QRS complexes at rate > 100 beats/minute(2)  Also called      V tach    VT  Types      electrocardiographic classification of ventricular tachycardias(2)    nonsustained ventricular tachycardia (VT)    VT ? 3 beats in duration    terminates spontaneously within 30 seconds    can be monomorphic (since QRS morphology) or polymorphic (changing QRS morphology with cycle length 180-600 milliseconds)    sustained VT    VT > 30 seconds    may require termination    can be monomorphic or polymorphic    bundle-branch reentrant tachycardia - VT usually due to left bundle-branch block (LBBB)    bidirectional VT - [id number] alternans in QRS frontal plane axis  History and Physical     History     Chief concern (CC)      patient concerns may include(1, 2)    palpitations    lightheadedness    syncope    angina    dyspnea    sudden cardiac death or cardiac arrest may be initial presentation(2)    may be asymptomatic(2)  Physical     General physical      tachycardia (regular rhythm with pulse > 100)(2)    hypotension(1)  Skin      pallor, diaphoresis if heart failure with reduced ejection fraction  Neck      \"frog sign\" is presence of prominent jugular venous pulsations (eubanks a-waves) (Rev Siobhan Cardiol (Engl Ed) 2012 May;65(5):456)  Cardiac      abnormal splitting of S2    "

## 2017-09-01 NOTE — PLAN OF CARE
Problem: Patient Care Overview (Adult)  Goal: Plan of Care Review  Outcome: Ongoing (interventions implemented as appropriate)    09/01/17 0928   Coping/Psychosocial Response Interventions   Plan Of Care Reviewed With patient   Patient Care Overview   Progress improving   Outcome Evaluation   Outcome Summary/Follow up Plan Patient denied complaints before discharge. Boyfriend/POA accompanied patient at time of discharge. Discharge instructions reviewed.

## 2017-09-05 NOTE — PAYOR COMM NOTE
"Tiffani Fiore (47 y.o. Female)           [facility]- MEDICAL MANAGEMENT DEPT, NOTIFICATION #[id number], DISCHARGE DATE/SUMMARY FOR YOUR       REVIEW, REPLY TO UR DEPT BY  [phone]       Date of Birth Social Security Number Address Home Phone MRN    1970  [address]  [address] [place], [place] [address] [phone] [id number]    [employer] Marital Status          None Single       Admission Date Admission Type Admitting Provider Attending Provider Department, Room/Bed    17 Emergency Grayson Yancey MD  [address] [facility],     Discharge Date Discharge Disposition Discharge Destination        2017 Home or Self Care             Attending Provider: (none)    Allergies:  No Known Allergies    Isolation:  None   Infection:  None   Code Status:  Prior    Ht:  65\" (165.1 cm)   Wt:  196 lb (88.9 kg)    Admission Cmt:  None   Principal Problem:  None                Active Insurance as of 2017     Primary Coverage     Payor Plan Insurance Group Employer/Plan Group    [facility] [employer] [facility] [employer] [facility] PPO [id number]     Payor Plan Address Payor Plan Phone Number Effective From Effective To    [address] [phone] 2016     [place] [address]       Subscriber Name Subscriber Birth Date Member ID       TIFFANI FIORE 1970 [id number]                 Emergency Contacts      (Rel.) Home Phone Work Phone Mobile Phone    PetrosChidi (Significant Other) [phone] -- [phone]               Discharge Summary      WILD Lyle at 2017  2:08 PM                                                                                                                DISCHARGE NOTE    Patient Name: Tiffani Fiore  Age/Sex: 47 y.o. female  : 1970  MRN: [id number]    Date of Discharge:  2017   Date of Admit: 2017  Encounter Provider: WILD Lyle  Place of Service: [facility] " [facility] CARDIOLOGY  Patient Care Team:  No Known Provider as PCP - General  No Known Provider as PCP - Family Medicine    Subjective:     Discharge Diagnosis:  Active Problems:    Ventricular tachycardia      Hospital Course:     47 year old female patient of Dr. Yancey's with past medical history of nonischemic cardiomyopathy, paroxysmal atrial tachycardia, DVT/PE (2012, was on warfarin for a year), MI (No obstructive disease or intervention), CHFand nonspecific IVCD with left bundle-branch block morphology.  She is status post CRT-D placement.  She had her initial device placed in [place] in October 2012. She also says that she had an EPS/Ablation for VT in [place] (we do not have those records). She moved to [place] and saw Dr. Yancey for the first time in October 2014. She had multiple CHF episodes over a year despite trying to optimize her CRT device so in 11/2015 she underwent generator change and new LV lead and has done well since that time. In 9/2016 she did receive inappropriate shocks for ST, her parameters were adjusted. She presented to ED on 8/22 with ICD shock x 2. She was symptomatic with dizziness, nausea and diaphoresis but did not pass out. Prior to admission she had been feeling well with no complaints. She had more VT and 1 ICD shock on 8/23. We started her on sotalol. She has had no more VT and her QT is okay and she was discharged home on 8/25/2017 on 120mg of sotalol BID. She presented to ER on 8/27/17 in sustained VT. She was symptomatic and was externally cardioverted. She was admitted, sotalol was discontinued and amiodarone was initiated. She is tolerating the amiodarone without difficulty. She did have a short run of NSVT 8/31 am. She was symptomatic but spontaneously converted. Her ICD zones have been reprogrammed as her VT rate is slower now. Plan for discharge home today on amiodarone 400mg bid until Monday then decrease to 400mg daily. Follow up with me in 1 month.       Vital  Signs  Temp:  [97.6 °F (36.4 °C)-97.9 °F (36.6 °C)] 97.7 °F (36.5 °C)  Heart Rate:  [72-98] 80  Resp:  [16-18] 16  BP: ()/(57-62) 104/58    Intake/Output Summary (Last 24 hours) at 09/01/17 1544  Last data filed at 09/01/17 1300   Gross per 24 hour   Intake              320 ml   Output             1225 ml   Net             -905 ml       Physical Exam:  Physical Exam   Constitutional: She is oriented to person, place, and time. She appears well-developed and well-nourished. No distress.   HENT:   Head: Normocephalic and atraumatic.   Eyes: Conjunctivae are normal. No scleral icterus.   Neck: Neck supple. No JVD present.   Cardiovascular: Normal rate, regular rhythm and normal heart sounds.    Pulmonary/Chest: Effort normal. No respiratory distress.   Abdominal: Soft.   Musculoskeletal: Normal range of motion. She exhibits no edema.   Neurological: She is alert and oriented to person, place, and time.   Skin: Skin is warm and dry.   Psychiatric: She has a normal mood and affect. Her behavior is normal.   Vitals reviewed.       Labs:     Results from last 7 days  Lab Units 08/31/17  0433 08/28/17  0447 08/27/17  1020   SODIUM mmol/L 140 144 140   POTASSIUM mmol/L 4.4 4.0 4.2   CHLORIDE mmol/L 102 108* 107   CO2 mmol/L 26.0 23.6 16.4*   BUN mg/dL 12 9 12   CREATININE mg/dL 0.74 0.62 0.81   GLUCOSE mg/dL 96 102* 198*   CALCIUM mg/dL 9.4 8.5* 8.4*   AST (SGOT) U/L  --   --  31   ALT (SGPT) U/L  --   --  29       Results from last 7 days  Lab Units 08/28/17  0623 08/28/17 0447 08/27/17  1020   TROPONIN T ng/mL 1.540* 1.560* <0.010       Results from last 7 days  Lab Units 08/31/17  0433 08/27/17  1020   WBC 10*3/mm3 9.13 9.35   HEMOGLOBIN g/dL 13.9 13.2   HEMATOCRIT % 41.4 38.7   PLATELETS 10*3/mm3 214 229       Results from last 7 days  Lab Units 08/27/17  1020   INR  1.21*   APTT seconds 30.1       Results from last 7 days  Lab Units 08/28/17  0447 08/27/17  1020   MAGNESIUM mg/dL 2.4 2.0           Results from  last 7 days  Lab Units 08/27/17  1020   PROBNP pg/mL 590.8*       Results from last 7 days  Lab Units 08/27/17  1020   TSH mIU/mL 2.140       Discharge Diet:         Dietary Orders            Start     Ordered    08/30/17 1251  Diet Regular; Cardiac  Diet Effective Now     Question Answer Comment   Diet Texture / Consistency Regular    Common Modifiers Cardiac        08/30/17 1250            Activity at Discharge:      Discharge Medications   AdebayoTovath FELICE   Home Medication Instructions ROBERTH:[id number]    Printed on:09/01/17 8347   Medication Information                      amiodarone (PACERONE) 200 MG tablet  Take 2 tablets by mouth Daily. She is to take 400mg BID until 9/4 then go to 400mg daily             aspirin 81 MG tablet  Take  by mouth daily.             carvedilol (COREG) 3.125 MG tablet  Take 1 tablet by mouth Every 12 (Twelve) Hours.             lisinopril (PRINIVIL,ZESTRIL) 2.5 MG tablet  Take  by mouth daily.                 Discharge disposition: home    Follow-up Appointments  Follow-up Information     Follow up with WILD Lyle Follow up in 4 week(s).    Specialty:  Cardiology    Why:  Follow-up Thursday 9/7/2017 at 10:40 in the office.    Contact information:    Chanda SANCHEZCHAPINCITO [place] [address]  [phone]          Future Appointments  Date Time Provider Department Center   11/22/2017 12:00 AM WILDER REMOTE, BRIDGET DOWNEY CD [facility] None   6/22/2018 8:30 AM WILDER IN OFFICE, BRIDGET DOWNEY CD [facility] None   6/22/2018 9:00 AM MD SHAYAN Rees CD [facility] None         WILD Lyle  09/01/17  3:44 PM       Electronically signed by Grayson Yancey MD at 9/1/2017  5:09 PM

## 2017-09-12 ENCOUNTER — TELEPHONE (OUTPATIENT)
Dept: CARDIOLOGY | Facility: CLINIC | Age: 47
End: 2017-09-12

## 2017-09-12 ENCOUNTER — CLINICAL SUPPORT NO REQUIREMENTS (OUTPATIENT)
Dept: CARDIOLOGY | Facility: CLINIC | Age: 47
End: 2017-09-12

## 2017-09-12 DIAGNOSIS — I42.9 CARDIOMYOPATHY (HCC): Primary | ICD-10-CM

## 2017-09-12 NOTE — TELEPHONE ENCOUNTER
Spoke to Shae in pacemaker, she is going to have her do a remote manual download and call me when available. She is going to call patient.

## 2017-09-12 NOTE — TELEPHONE ENCOUNTER
Pt is calling left message stating she has been having episodes of VT that have been lasting over and hour. Pt is requesting a call from Maci. Ms. Fiore can be reached at [phone].

## 2017-09-20 ENCOUNTER — CLINICAL SUPPORT NO REQUIREMENTS (OUTPATIENT)
Dept: CARDIOLOGY | Facility: CLINIC | Age: 47
End: 2017-09-20

## 2017-09-20 DIAGNOSIS — I47.20 VENTRICULAR TACHYCARDIA (HCC): Primary | ICD-10-CM

## 2017-09-22 ENCOUNTER — TRANSCRIBE ORDERS (OUTPATIENT)
Dept: CARDIOLOGY | Facility: CLINIC | Age: 47
End: 2017-09-22

## 2017-09-22 ENCOUNTER — TRANSCRIBE ORDERS (OUTPATIENT)
Dept: ADMINISTRATIVE | Facility: HOSPITAL | Age: 47
End: 2017-09-22

## 2017-09-22 ENCOUNTER — LAB (OUTPATIENT)
Dept: LAB | Facility: HOSPITAL | Age: 47
End: 2017-09-22
Attending: INTERNAL MEDICINE

## 2017-09-22 DIAGNOSIS — I47.29 PAROXYSMAL VENTRICULAR TACHYCARDIA (HCC): ICD-10-CM

## 2017-09-22 DIAGNOSIS — Z13.6 SCREENING FOR ISCHEMIC HEART DISEASE: ICD-10-CM

## 2017-09-22 DIAGNOSIS — Z01.810 PRE-OPERATIVE CARDIOVASCULAR EXAMINATION: ICD-10-CM

## 2017-09-22 DIAGNOSIS — Z01.810 PRE-OPERATIVE CARDIOVASCULAR EXAMINATION: Primary | ICD-10-CM

## 2017-09-22 LAB
ANION GAP SERPL CALCULATED.3IONS-SCNC: 11 MMOL/L
BASOPHILS # BLD AUTO: 0.03 10*3/MM3 (ref 0–0.2)
BASOPHILS NFR BLD AUTO: 0.3 % (ref 0–1.5)
BUN BLD-MCNC: 25 MG/DL (ref 6–20)
BUN/CREAT SERPL: 29.8 (ref 7–25)
CALCIUM SPEC-SCNC: 9.3 MG/DL (ref 8.6–10.5)
CHLORIDE SERPL-SCNC: 105 MMOL/L (ref 98–107)
CO2 SERPL-SCNC: 26 MMOL/L (ref 22–29)
CREAT BLD-MCNC: 0.84 MG/DL (ref 0.57–1)
DEPRECATED RDW RBC AUTO: 42.2 FL (ref 37–54)
EOSINOPHIL # BLD AUTO: 0.42 10*3/MM3 (ref 0–0.7)
EOSINOPHIL NFR BLD AUTO: 4.1 % (ref 0.3–6.2)
ERYTHROCYTE [DISTWIDTH] IN BLOOD BY AUTOMATED COUNT: 12.6 % (ref 11.7–13)
GFR SERPL CREATININE-BSD FRML MDRD: 73 ML/MIN/1.73
GLUCOSE BLD-MCNC: 95 MG/DL (ref 65–99)
HCT VFR BLD AUTO: 42.4 % (ref 35.6–45.5)
HGB BLD-MCNC: 14.2 G/DL (ref 11.9–15.5)
IMM GRANULOCYTES # BLD: 0.03 10*3/MM3 (ref 0–0.03)
IMM GRANULOCYTES NFR BLD: 0.3 % (ref 0–0.5)
LYMPHOCYTES # BLD AUTO: 1.95 10*3/MM3 (ref 0.9–4.8)
LYMPHOCYTES NFR BLD AUTO: 19.2 % (ref 19.6–45.3)
MCH RBC QN AUTO: 30.9 PG (ref 26.9–32)
MCHC RBC AUTO-ENTMCNC: 33.5 G/DL (ref 32.4–36.3)
MCV RBC AUTO: 92.4 FL (ref 80.5–98.2)
MONOCYTES # BLD AUTO: 0.6 10*3/MM3 (ref 0.2–1.2)
MONOCYTES NFR BLD AUTO: 5.9 % (ref 5–12)
NEUTROPHILS # BLD AUTO: 7.13 10*3/MM3 (ref 1.9–8.1)
NEUTROPHILS NFR BLD AUTO: 70.2 % (ref 42.7–76)
PLATELET # BLD AUTO: 186 10*3/MM3 (ref 140–500)
PMV BLD AUTO: 10.5 FL (ref 6–12)
POTASSIUM BLD-SCNC: 4.5 MMOL/L (ref 3.5–5.2)
RBC # BLD AUTO: 4.59 10*6/MM3 (ref 3.9–5.2)
SODIUM BLD-SCNC: 142 MMOL/L (ref 136–145)
WBC NRBC COR # BLD: 10.16 10*3/MM3 (ref 4.5–10.7)

## 2017-09-22 PROCEDURE — 80048 BASIC METABOLIC PNL TOTAL CA: CPT

## 2017-09-22 PROCEDURE — 85025 COMPLETE CBC W/AUTO DIFF WBC: CPT

## 2017-09-22 PROCEDURE — 36415 COLL VENOUS BLD VENIPUNCTURE: CPT

## 2017-09-26 RX ORDER — CARVEDILOL 3.12 MG/1
3.12 TABLET ORAL EVERY EVENING
COMMUNITY
End: 2017-10-21 | Stop reason: HOSPADM

## 2017-09-26 RX ORDER — AMIODARONE HYDROCHLORIDE 200 MG/1
200 TABLET ORAL DAILY
Status: ON HOLD | COMMUNITY
End: 2017-10-02

## 2017-09-27 ENCOUNTER — HOSPITAL ENCOUNTER (OUTPATIENT)
Facility: HOSPITAL | Age: 47
Setting detail: OBSERVATION
Discharge: HOME | End: 2017-09-28
Attending: INTERNAL MEDICINE | Admitting: INTERNAL MEDICINE

## 2017-09-27 PROBLEM — I49.9 ARRHYTHMIA: Status: ACTIVE | Noted: 2017-09-27

## 2017-09-27 PROCEDURE — C1894 INTRO/SHEATH, NON-LASER: HCPCS | Performed by: INTERNAL MEDICINE

## 2017-09-27 PROCEDURE — C1732 CATH, EP, DIAG/ABL, 3D/VECT: HCPCS | Performed by: INTERNAL MEDICINE

## 2017-09-27 PROCEDURE — C1730 CATH, EP, 19 OR FEW ELECT: HCPCS | Performed by: INTERNAL MEDICINE

## 2017-09-27 PROCEDURE — 25010000002 PROTAMINE SULFATE PER 10 MG: Performed by: INTERNAL MEDICINE

## 2017-09-27 PROCEDURE — G0378 HOSPITAL OBSERVATION PER HR: HCPCS

## 2017-09-27 PROCEDURE — 0 IOPAMIDOL PER 1 ML: Performed by: INTERNAL MEDICINE

## 2017-09-27 PROCEDURE — 85347 COAGULATION TIME ACTIVATED: CPT

## 2017-09-27 PROCEDURE — 93005 ELECTROCARDIOGRAM TRACING: CPT | Performed by: INTERNAL MEDICINE

## 2017-09-27 PROCEDURE — C1760 CLOSURE DEV, VASC: HCPCS | Performed by: INTERNAL MEDICINE

## 2017-09-27 PROCEDURE — 93654 COMPRE EP EVAL TX VT: CPT | Performed by: INTERNAL MEDICINE

## 2017-09-27 PROCEDURE — 25010000002 ONDANSETRON PER 1 MG: Performed by: INTERNAL MEDICINE

## 2017-09-27 PROCEDURE — 25010000002 DOPAMINE PER 40 MG: Performed by: INTERNAL MEDICINE

## 2017-09-27 PROCEDURE — 25010000002 MIDAZOLAM PER 1 MG: Performed by: INTERNAL MEDICINE

## 2017-09-27 PROCEDURE — 93010 ELECTROCARDIOGRAM REPORT: CPT | Performed by: INTERNAL MEDICINE

## 2017-09-27 PROCEDURE — 25010000002 FENTANYL CITRATE (PF) 100 MCG/2ML SOLUTION: Performed by: INTERNAL MEDICINE

## 2017-09-27 PROCEDURE — 99152 MOD SED SAME PHYS/QHP 5/>YRS: CPT | Performed by: INTERNAL MEDICINE

## 2017-09-27 PROCEDURE — 99153 MOD SED SAME PHYS/QHP EA: CPT | Performed by: INTERNAL MEDICINE

## 2017-09-27 PROCEDURE — 25010000002 HEPARIN (PORCINE) PER 1000 UNITS: Performed by: INTERNAL MEDICINE

## 2017-09-27 RX ORDER — SODIUM CHLORIDE 0.9 % (FLUSH) 0.9 %
1-10 SYRINGE (ML) INJECTION AS NEEDED
Status: DISCONTINUED | OUTPATIENT
Start: 2017-09-27 | End: 2017-09-28 | Stop reason: HOSPADM

## 2017-09-27 RX ORDER — HYDROCODONE BITARTRATE AND ACETAMINOPHEN 10; 325 MG/1; MG/1
1 TABLET ORAL EVERY 4 HOURS PRN
Status: DISCONTINUED | OUTPATIENT
Start: 2017-09-27 | End: 2017-09-28 | Stop reason: HOSPADM

## 2017-09-27 RX ORDER — SODIUM CHLORIDE 9 MG/ML
INJECTION, SOLUTION INTRAVENOUS CONTINUOUS PRN
Status: DISCONTINUED | OUTPATIENT
Start: 2017-09-27 | End: 2017-09-27 | Stop reason: HOSPADM

## 2017-09-27 RX ORDER — SODIUM CHLORIDE 9 MG/ML
75 INJECTION, SOLUTION INTRAVENOUS CONTINUOUS
Status: DISCONTINUED | OUTPATIENT
Start: 2017-09-27 | End: 2017-09-27

## 2017-09-27 RX ORDER — HEPARIN SODIUM 1000 [USP'U]/ML
INJECTION, SOLUTION INTRAVENOUS; SUBCUTANEOUS AS NEEDED
Status: DISCONTINUED | OUTPATIENT
Start: 2017-09-27 | End: 2017-09-27 | Stop reason: HOSPADM

## 2017-09-27 RX ORDER — MIDAZOLAM HYDROCHLORIDE 1 MG/ML
INJECTION INTRAMUSCULAR; INTRAVENOUS AS NEEDED
Status: DISCONTINUED | OUTPATIENT
Start: 2017-09-27 | End: 2017-09-27 | Stop reason: HOSPADM

## 2017-09-27 RX ORDER — DOPAMINE HYDROCHLORIDE 160 MG/100ML
INJECTION, SOLUTION INTRAVENOUS CONTINUOUS PRN
Status: DISCONTINUED | OUTPATIENT
Start: 2017-09-27 | End: 2017-09-27 | Stop reason: HOSPADM

## 2017-09-27 RX ORDER — HYDROCODONE BITARTRATE AND ACETAMINOPHEN 7.5; 325 MG/1; MG/1
1 TABLET ORAL EVERY 4 HOURS PRN
Status: DISCONTINUED | OUTPATIENT
Start: 2017-09-27 | End: 2017-09-28 | Stop reason: HOSPADM

## 2017-09-27 RX ORDER — NALOXONE HCL 0.4 MG/ML
0.4 VIAL (ML) INJECTION
Status: DISCONTINUED | OUTPATIENT
Start: 2017-09-27 | End: 2017-09-28 | Stop reason: HOSPADM

## 2017-09-27 RX ORDER — ONDANSETRON 2 MG/ML
INJECTION INTRAMUSCULAR; INTRAVENOUS AS NEEDED
Status: DISCONTINUED | OUTPATIENT
Start: 2017-09-27 | End: 2017-09-27 | Stop reason: HOSPADM

## 2017-09-27 RX ORDER — AMIODARONE HYDROCHLORIDE 200 MG/1
200 TABLET ORAL DAILY
Status: DISCONTINUED | OUTPATIENT
Start: 2017-09-27 | End: 2017-09-28 | Stop reason: HOSPADM

## 2017-09-27 RX ORDER — LISINOPRIL 2.5 MG/1
2.5 TABLET ORAL DAILY
Status: DISCONTINUED | OUTPATIENT
Start: 2017-09-27 | End: 2017-09-28 | Stop reason: HOSPADM

## 2017-09-27 RX ORDER — CARVEDILOL 3.12 MG/1
3.12 TABLET ORAL 2 TIMES DAILY WITH MEALS
Status: DISCONTINUED | OUTPATIENT
Start: 2017-09-27 | End: 2017-09-28 | Stop reason: HOSPADM

## 2017-09-27 RX ORDER — LIDOCAINE HYDROCHLORIDE 10 MG/ML
INJECTION, SOLUTION INFILTRATION; PERINEURAL AS NEEDED
Status: DISCONTINUED | OUTPATIENT
Start: 2017-09-27 | End: 2017-09-27 | Stop reason: HOSPADM

## 2017-09-27 RX ORDER — PROTAMINE SULFATE 10 MG/ML
INJECTION, SOLUTION INTRAVENOUS AS NEEDED
Status: DISCONTINUED | OUTPATIENT
Start: 2017-09-27 | End: 2017-09-27 | Stop reason: HOSPADM

## 2017-09-27 RX ORDER — LIDOCAINE HYDROCHLORIDE 10 MG/ML
0.1 INJECTION, SOLUTION EPIDURAL; INFILTRATION; INTRACAUDAL; PERINEURAL ONCE AS NEEDED
Status: DISCONTINUED | OUTPATIENT
Start: 2017-09-27 | End: 2017-09-28 | Stop reason: HOSPADM

## 2017-09-27 RX ORDER — FENTANYL CITRATE 50 UG/ML
INJECTION, SOLUTION INTRAMUSCULAR; INTRAVENOUS AS NEEDED
Status: DISCONTINUED | OUTPATIENT
Start: 2017-09-27 | End: 2017-09-27 | Stop reason: HOSPADM

## 2017-09-27 RX ORDER — ASPIRIN 81 MG/1
81 TABLET ORAL DAILY
Status: DISCONTINUED | OUTPATIENT
Start: 2017-09-27 | End: 2017-09-28 | Stop reason: HOSPADM

## 2017-09-27 RX ADMIN — SODIUM CHLORIDE 75 ML/HR: 9 INJECTION, SOLUTION INTRAVENOUS at 12:37

## 2017-09-27 RX ADMIN — ASPIRIN 81 MG: 81 TABLET ORAL at 22:51

## 2017-09-27 RX ADMIN — AMIODARONE HYDROCHLORIDE 200 MG: 200 TABLET ORAL at 22:50

## 2017-09-28 VITALS
RESPIRATION RATE: 18 BRPM | SYSTOLIC BLOOD PRESSURE: 96 MMHG | HEIGHT: 65 IN | TEMPERATURE: 97.8 F | OXYGEN SATURATION: 99 % | HEART RATE: 70 BPM | DIASTOLIC BLOOD PRESSURE: 54 MMHG | BODY MASS INDEX: 30.82 KG/M2 | WEIGHT: 185 LBS

## 2017-09-28 LAB
ACT BLD: 246 SECONDS (ref 82–152)
ACT BLD: 257 SECONDS (ref 82–152)
ACT BLD: 285 SECONDS (ref 82–152)

## 2017-09-28 PROCEDURE — 93005 ELECTROCARDIOGRAM TRACING: CPT | Performed by: INTERNAL MEDICINE

## 2017-09-28 PROCEDURE — G0378 HOSPITAL OBSERVATION PER HR: HCPCS

## 2017-09-28 PROCEDURE — 93010 ELECTROCARDIOGRAM REPORT: CPT | Performed by: INTERNAL MEDICINE

## 2017-09-28 PROCEDURE — 99217 PR OBSERVATION CARE DISCHARGE MANAGEMENT: CPT | Performed by: NURSE PRACTITIONER

## 2017-09-28 RX ADMIN — AMIODARONE HYDROCHLORIDE 200 MG: 200 TABLET ORAL at 09:29

## 2017-09-28 RX ADMIN — ASPIRIN 81 MG: 81 TABLET ORAL at 09:29

## 2017-09-30 ENCOUNTER — HOSPITAL ENCOUNTER (INPATIENT)
Facility: HOSPITAL | Age: 47
LOS: 2 days | Discharge: HOME | End: 2017-10-02
Attending: EMERGENCY MEDICINE | Admitting: INTERNAL MEDICINE

## 2017-09-30 ENCOUNTER — CLINICAL SUPPORT NO REQUIREMENTS (OUTPATIENT)
Dept: CARDIOLOGY | Facility: CLINIC | Age: 47
End: 2017-09-30

## 2017-09-30 ENCOUNTER — APPOINTMENT (OUTPATIENT)
Dept: GENERAL RADIOLOGY | Facility: HOSPITAL | Age: 47
End: 2017-09-30

## 2017-09-30 DIAGNOSIS — I42.8 NONISCHEMIC CARDIOMYOPATHY (HCC): ICD-10-CM

## 2017-09-30 DIAGNOSIS — R77.8 ELEVATED TROPONIN: ICD-10-CM

## 2017-09-30 DIAGNOSIS — I47.20 VENTRICULAR TACHYCARDIA (HCC): Primary | ICD-10-CM

## 2017-09-30 DIAGNOSIS — I47.20 RECURRENT VENTRICULAR TACHYCARDIA (HCC): Primary | ICD-10-CM

## 2017-09-30 LAB
ALBUMIN SERPL-MCNC: 4.4 G/DL (ref 3.5–5.2)
ALBUMIN/GLOB SERPL: 1.4 G/DL
ALP SERPL-CCNC: 95 U/L (ref 39–117)
ALT SERPL W P-5'-P-CCNC: 15 U/L (ref 1–33)
ANION GAP SERPL CALCULATED.3IONS-SCNC: 12.8 MMOL/L
ANION GAP SERPL CALCULATED.3IONS-SCNC: 13.5 MMOL/L
AST SERPL-CCNC: 13 U/L (ref 1–32)
BASOPHILS # BLD AUTO: 0.03 10*3/MM3 (ref 0–0.2)
BASOPHILS NFR BLD AUTO: 0.4 % (ref 0–1.5)
BILIRUB SERPL-MCNC: 0.4 MG/DL (ref 0.1–1.2)
BUN BLD-MCNC: 11 MG/DL (ref 6–20)
BUN BLD-MCNC: 11 MG/DL (ref 6–20)
BUN/CREAT SERPL: 15.3 (ref 7–25)
BUN/CREAT SERPL: 15.9 (ref 7–25)
CALCIUM SPEC-SCNC: 9.2 MG/DL (ref 8.6–10.5)
CALCIUM SPEC-SCNC: 9.4 MG/DL (ref 8.6–10.5)
CHLORIDE SERPL-SCNC: 103 MMOL/L (ref 98–107)
CHLORIDE SERPL-SCNC: 104 MMOL/L (ref 98–107)
CO2 SERPL-SCNC: 23.5 MMOL/L (ref 22–29)
CO2 SERPL-SCNC: 25.2 MMOL/L (ref 22–29)
CREAT BLD-MCNC: 0.69 MG/DL (ref 0.57–1)
CREAT BLD-MCNC: 0.72 MG/DL (ref 0.57–1)
DEPRECATED RDW RBC AUTO: 42.4 FL (ref 37–54)
EOSINOPHIL # BLD AUTO: 0.34 10*3/MM3 (ref 0–0.7)
EOSINOPHIL NFR BLD AUTO: 4 % (ref 0.3–6.2)
ERYTHROCYTE [DISTWIDTH] IN BLOOD BY AUTOMATED COUNT: 12.4 % (ref 11.7–13)
GFR SERPL CREATININE-BSD FRML MDRD: 87 ML/MIN/1.73
GFR SERPL CREATININE-BSD FRML MDRD: 91 ML/MIN/1.73
GLOBULIN UR ELPH-MCNC: 3.1 GM/DL
GLUCOSE BLD-MCNC: 104 MG/DL (ref 65–99)
GLUCOSE BLD-MCNC: 93 MG/DL (ref 65–99)
HCT VFR BLD AUTO: 41 % (ref 35.6–45.5)
HGB BLD-MCNC: 13.8 G/DL (ref 11.9–15.5)
HOLD SPECIMEN: NORMAL
HOLD SPECIMEN: NORMAL
IMM GRANULOCYTES # BLD: 0.02 10*3/MM3 (ref 0–0.03)
IMM GRANULOCYTES NFR BLD: 0.2 % (ref 0–0.5)
LYMPHOCYTES # BLD AUTO: 1.82 10*3/MM3 (ref 0.9–4.8)
LYMPHOCYTES NFR BLD AUTO: 21.4 % (ref 19.6–45.3)
MAGNESIUM SERPL-MCNC: 2.2 MG/DL (ref 1.6–2.6)
MCH RBC QN AUTO: 31.4 PG (ref 26.9–32)
MCHC RBC AUTO-ENTMCNC: 33.7 G/DL (ref 32.4–36.3)
MCV RBC AUTO: 93.4 FL (ref 80.5–98.2)
MONOCYTES # BLD AUTO: 0.41 10*3/MM3 (ref 0.2–1.2)
MONOCYTES NFR BLD AUTO: 4.8 % (ref 5–12)
NEUTROPHILS # BLD AUTO: 5.87 10*3/MM3 (ref 1.9–8.1)
NEUTROPHILS NFR BLD AUTO: 69.2 % (ref 42.7–76)
PLATELET # BLD AUTO: 181 10*3/MM3 (ref 140–500)
PMV BLD AUTO: 10.5 FL (ref 6–12)
POTASSIUM BLD-SCNC: 4.3 MMOL/L (ref 3.5–5.2)
POTASSIUM BLD-SCNC: 4.4 MMOL/L (ref 3.5–5.2)
PROT SERPL-MCNC: 7.5 G/DL (ref 6–8.5)
RBC # BLD AUTO: 4.39 10*6/MM3 (ref 3.9–5.2)
SODIUM BLD-SCNC: 140 MMOL/L (ref 136–145)
SODIUM BLD-SCNC: 142 MMOL/L (ref 136–145)
TROPONIN T SERPL-MCNC: 0.12 NG/ML (ref 0–0.03)
WBC NRBC COR # BLD: 8.49 10*3/MM3 (ref 4.5–10.7)
WHOLE BLOOD HOLD SPECIMEN: NORMAL
WHOLE BLOOD HOLD SPECIMEN: NORMAL

## 2017-09-30 PROCEDURE — 99222 1ST HOSP IP/OBS MODERATE 55: CPT | Performed by: INTERNAL MEDICINE

## 2017-09-30 PROCEDURE — 85025 COMPLETE CBC W/AUTO DIFF WBC: CPT | Performed by: EMERGENCY MEDICINE

## 2017-09-30 PROCEDURE — 25010000002 ENOXAPARIN PER 10 MG: Performed by: INTERNAL MEDICINE

## 2017-09-30 PROCEDURE — 83735 ASSAY OF MAGNESIUM: CPT | Performed by: EMERGENCY MEDICINE

## 2017-09-30 PROCEDURE — 25010000002 ONDANSETRON PER 1 MG: Performed by: INTERNAL MEDICINE

## 2017-09-30 PROCEDURE — 80053 COMPREHEN METABOLIC PANEL: CPT | Performed by: EMERGENCY MEDICINE

## 2017-09-30 PROCEDURE — 93296 REM INTERROG EVL PM/IDS: CPT | Performed by: INTERNAL MEDICINE

## 2017-09-30 PROCEDURE — 93005 ELECTROCARDIOGRAM TRACING: CPT

## 2017-09-30 PROCEDURE — 80048 BASIC METABOLIC PNL TOTAL CA: CPT | Performed by: INTERNAL MEDICINE

## 2017-09-30 PROCEDURE — 71020 HC CHEST PA AND LATERAL: CPT

## 2017-09-30 PROCEDURE — 99284 EMERGENCY DEPT VISIT MOD MDM: CPT

## 2017-09-30 PROCEDURE — 84484 ASSAY OF TROPONIN QUANT: CPT | Performed by: EMERGENCY MEDICINE

## 2017-09-30 PROCEDURE — 93010 ELECTROCARDIOGRAM REPORT: CPT | Performed by: INTERNAL MEDICINE

## 2017-09-30 PROCEDURE — 93295 DEV INTERROG REMOTE 1/2/MLT: CPT | Performed by: INTERNAL MEDICINE

## 2017-09-30 RX ORDER — CARVEDILOL 3.12 MG/1
3.12 TABLET ORAL 2 TIMES DAILY WITH MEALS
Status: DISCONTINUED | OUTPATIENT
Start: 2017-09-30 | End: 2017-09-30

## 2017-09-30 RX ORDER — ALPRAZOLAM 0.25 MG/1
0.25 TABLET ORAL 2 TIMES DAILY PRN
Status: ON HOLD | COMMUNITY
End: 2020-02-24

## 2017-09-30 RX ORDER — ACETAMINOPHEN 325 MG/1
650 TABLET ORAL EVERY 4 HOURS PRN
Status: DISCONTINUED | OUTPATIENT
Start: 2017-09-30 | End: 2017-10-02 | Stop reason: HOSPADM

## 2017-09-30 RX ORDER — CARVEDILOL 3.12 MG/1
3.12 TABLET ORAL 2 TIMES DAILY WITH MEALS
Status: DISCONTINUED | OUTPATIENT
Start: 2017-09-30 | End: 2017-10-02 | Stop reason: HOSPADM

## 2017-09-30 RX ORDER — ALPRAZOLAM 0.25 MG/1
0.25 TABLET ORAL 2 TIMES DAILY PRN
Status: DISCONTINUED | OUTPATIENT
Start: 2017-09-30 | End: 2017-10-02 | Stop reason: HOSPADM

## 2017-09-30 RX ORDER — SODIUM CHLORIDE 0.9 % (FLUSH) 0.9 %
10 SYRINGE (ML) INJECTION AS NEEDED
Status: DISCONTINUED | OUTPATIENT
Start: 2017-09-30 | End: 2017-10-02 | Stop reason: HOSPADM

## 2017-09-30 RX ORDER — SODIUM CHLORIDE 0.9 % (FLUSH) 0.9 %
1-10 SYRINGE (ML) INJECTION AS NEEDED
Status: DISCONTINUED | OUTPATIENT
Start: 2017-09-30 | End: 2017-10-02 | Stop reason: HOSPADM

## 2017-09-30 RX ORDER — DOCUSATE SODIUM 100 MG/1
100 CAPSULE, LIQUID FILLED ORAL DAILY PRN
Status: DISCONTINUED | OUTPATIENT
Start: 2017-09-30 | End: 2017-10-02 | Stop reason: HOSPADM

## 2017-09-30 RX ORDER — LISINOPRIL 2.5 MG/1
2.5 TABLET ORAL DAILY
Status: DISCONTINUED | OUTPATIENT
Start: 2017-09-30 | End: 2017-09-30

## 2017-09-30 RX ORDER — MEXILETINE HYDROCHLORIDE 200 MG/1
200 CAPSULE ORAL EVERY 8 HOURS SCHEDULED
Status: DISCONTINUED | OUTPATIENT
Start: 2017-09-30 | End: 2017-10-02 | Stop reason: HOSPADM

## 2017-09-30 RX ORDER — AMIODARONE HYDROCHLORIDE 200 MG/1
200 TABLET ORAL DAILY
Status: DISCONTINUED | OUTPATIENT
Start: 2017-09-30 | End: 2017-10-02

## 2017-09-30 RX ORDER — NITROGLYCERIN 0.4 MG/1
0.4 TABLET SUBLINGUAL
Status: DISCONTINUED | OUTPATIENT
Start: 2017-09-30 | End: 2017-10-02 | Stop reason: HOSPADM

## 2017-09-30 RX ORDER — ASPIRIN 325 MG
325 TABLET ORAL ONCE
Status: COMPLETED | OUTPATIENT
Start: 2017-09-30 | End: 2017-09-30

## 2017-09-30 RX ORDER — ASPIRIN 81 MG/1
81 TABLET, CHEWABLE ORAL ONCE
Status: COMPLETED | OUTPATIENT
Start: 2017-09-30 | End: 2017-09-30

## 2017-09-30 RX ORDER — ONDANSETRON 2 MG/ML
4 INJECTION INTRAMUSCULAR; INTRAVENOUS EVERY 6 HOURS PRN
Status: DISCONTINUED | OUTPATIENT
Start: 2017-09-30 | End: 2017-10-02 | Stop reason: HOSPADM

## 2017-09-30 RX ADMIN — ENOXAPARIN SODIUM 40 MG: 40 INJECTION SUBCUTANEOUS at 17:40

## 2017-09-30 RX ADMIN — AMIODARONE HYDROCHLORIDE 200 MG: 200 TABLET ORAL at 17:44

## 2017-09-30 RX ADMIN — ONDANSETRON 4 MG: 2 INJECTION INTRAMUSCULAR; INTRAVENOUS at 17:55

## 2017-09-30 RX ADMIN — MEXILETINE HYDROCHLORIDE 200 MG: 200 CAPSULE ORAL at 17:44

## 2017-09-30 RX ADMIN — ASPIRIN 81 MG: 81 TABLET, CHEWABLE ORAL at 17:40

## 2017-09-30 RX ADMIN — ASPIRIN 325 MG: 325 TABLET ORAL at 09:18

## 2017-10-01 LAB
ANION GAP SERPL CALCULATED.3IONS-SCNC: 13.3 MMOL/L
BUN BLD-MCNC: 16 MG/DL (ref 6–20)
BUN/CREAT SERPL: 21.1 (ref 7–25)
CALCIUM SPEC-SCNC: 9 MG/DL (ref 8.6–10.5)
CHLORIDE SERPL-SCNC: 105 MMOL/L (ref 98–107)
CO2 SERPL-SCNC: 24.7 MMOL/L (ref 22–29)
CREAT BLD-MCNC: 0.76 MG/DL (ref 0.57–1)
GFR SERPL CREATININE-BSD FRML MDRD: 82 ML/MIN/1.73
GLUCOSE BLD-MCNC: 100 MG/DL (ref 65–99)
MAGNESIUM SERPL-MCNC: 2.4 MG/DL (ref 1.6–2.6)
POTASSIUM BLD-SCNC: 4.2 MMOL/L (ref 3.5–5.2)
SODIUM BLD-SCNC: 143 MMOL/L (ref 136–145)

## 2017-10-01 PROCEDURE — 83735 ASSAY OF MAGNESIUM: CPT | Performed by: INTERNAL MEDICINE

## 2017-10-01 PROCEDURE — 25010000002 ENOXAPARIN PER 10 MG: Performed by: INTERNAL MEDICINE

## 2017-10-01 PROCEDURE — 99232 SBSQ HOSP IP/OBS MODERATE 35: CPT | Performed by: INTERNAL MEDICINE

## 2017-10-01 PROCEDURE — 80048 BASIC METABOLIC PNL TOTAL CA: CPT | Performed by: INTERNAL MEDICINE

## 2017-10-01 RX ADMIN — ENOXAPARIN SODIUM 40 MG: 40 INJECTION SUBCUTANEOUS at 18:07

## 2017-10-01 RX ADMIN — CARVEDILOL 3.12 MG: 3.12 TABLET, FILM COATED ORAL at 18:08

## 2017-10-01 RX ADMIN — ALPRAZOLAM 0.25 MG: 0.25 TABLET ORAL at 10:08

## 2017-10-01 RX ADMIN — MEXILETINE HYDROCHLORIDE 200 MG: 200 CAPSULE ORAL at 02:24

## 2017-10-01 RX ADMIN — MEXILETINE HYDROCHLORIDE 200 MG: 200 CAPSULE ORAL at 18:07

## 2017-10-01 RX ADMIN — MEXILETINE HYDROCHLORIDE 200 MG: 200 CAPSULE ORAL at 10:50

## 2017-10-01 RX ADMIN — DOCUSATE SODIUM 100 MG: 100 CAPSULE, LIQUID FILLED ORAL at 08:01

## 2017-10-01 RX ADMIN — AMIODARONE HYDROCHLORIDE 200 MG: 200 TABLET ORAL at 08:01

## 2017-10-01 NOTE — PROGRESS NOTES
LOS: 1 day   Patient Care Team:  No Known Provider as PCP - General  No Known Provider as PCP - Family Medicine    Chief Complaint: Follow-up ventricular tachycardia, non-ischemic CM, ICD    Interval History: Tachycardic (sinus) with exertion earlier, but no recurrent VT overnight.  Feels weak. No CP or SOA.    Vital Signs:  Temp:  [98 °F (36.7 °C)-98.5 °F (36.9 °C)] 98.4 °F (36.9 °C)  Heart Rate:  [67-88] 67  Resp:  [16-18] 16  BP: ()/(48-74) 101/48    Intake/Output Summary (Last 24 hours) at 10/01/17 1340  Last data filed at 10/01/17 1006   Gross per 24 hour   Intake              720 ml   Output             1000 ml   Net             -280 ml       Physical Exam:   General Appearance:    No acute distress, alert and oriented x4   Lungs:     Clear to auscultation bilaterally     Heart:    Regular rhythm and normal rate.  No murmurs, gallops, or       rubs.   Abdomen:     Soft, non-tender, non-distended.    Extremities:   Moves all extremities well.  No clubbing, cyanosis, or edema.     Results Review:      Results from last 7 days  Lab Units 10/01/17  0540   SODIUM mmol/L 143   POTASSIUM mmol/L 4.2   CHLORIDE mmol/L 105   CO2 mmol/L 24.7   BUN mg/dL 16   CREATININE mg/dL 0.76   GLUCOSE mg/dL 100*   CALCIUM mg/dL 9.0       Results from last 7 days  Lab Units 09/30/17  0809   TROPONIN T ng/mL 0.124*       Results from last 7 days  Lab Units 09/30/17  0809   WBC 10*3/mm3 8.49   HEMOGLOBIN g/dL 13.8   HEMATOCRIT % 41.0   PLATELETS 10*3/mm3 181               Results from last 7 days  Lab Units 10/01/17  0540   MAGNESIUM mg/dL 2.4           I reviewed the patient's new clinical results.        Assessment:  1. Non-ischemic CM - EF 24%  2. Recurrent ventricular tachycardia   3. Status post ICD placement   4. Prior VT ablation in [place] and recent re-do ablation by Dr. Yancey  5. Hypotension at baseline   6. Prior DVT and PE in 2012   7. Normal coronary arteries     Plan:  -Continue Mexiletine - added yesterday to Amio  and Coreg.  -ICD in place.  -Dr. Yancey to see tomorrow AM.  May ultimately require re-do ablation in [place] (will defer to Dr. Yancey on this).      Jairo Marshall MD  10/01/17  1:40 PM

## 2017-10-01 NOTE — PLAN OF CARE
Problem: Patient Care Overview (Adult)  Goal: Plan of Care Review  Outcome: Ongoing (interventions implemented as appropriate)    10/01/17 0121   Outcome Evaluation   Outcome Summary/Follow up Plan vss, no changes, waiting to see if new med (Mexiletine) able to control v-tach episodes.       Goal: Adult Individualization and Mutuality  Outcome: Ongoing (interventions implemented as appropriate)  Goal: Discharge Needs Assessment  Outcome: Ongoing (interventions implemented as appropriate)    Problem: Arrhythmia/Dysrhythmia (Symptomatic) (Adult)  Goal: Signs and Symptoms of Listed Potential Problems Will be Absent or Manageable (Arrhythmia/Dysrhythmia)  Outcome: Ongoing (interventions implemented as appropriate)    Problem: Fall Risk (Adult)  Goal: Identify Related Risk Factors and Signs and Symptoms  Outcome: Ongoing (interventions implemented as appropriate)  Goal: Absence of Falls  Outcome: Ongoing (interventions implemented as appropriate)

## 2017-10-02 VITALS
SYSTOLIC BLOOD PRESSURE: 104 MMHG | OXYGEN SATURATION: 97 % | TEMPERATURE: 98.1 F | WEIGHT: 186.1 LBS | BODY MASS INDEX: 29.91 KG/M2 | HEIGHT: 66 IN | HEART RATE: 72 BPM | RESPIRATION RATE: 16 BRPM | DIASTOLIC BLOOD PRESSURE: 66 MMHG

## 2017-10-02 LAB
ANION GAP SERPL CALCULATED.3IONS-SCNC: 13.4 MMOL/L
BUN BLD-MCNC: 15 MG/DL (ref 6–20)
BUN/CREAT SERPL: 16.7 (ref 7–25)
CALCIUM SPEC-SCNC: 9.2 MG/DL (ref 8.6–10.5)
CHLORIDE SERPL-SCNC: 101 MMOL/L (ref 98–107)
CO2 SERPL-SCNC: 25.6 MMOL/L (ref 22–29)
CREAT BLD-MCNC: 0.9 MG/DL (ref 0.57–1)
GFR SERPL CREATININE-BSD FRML MDRD: 67 ML/MIN/1.73
GLUCOSE BLD-MCNC: 131 MG/DL (ref 65–99)
POTASSIUM BLD-SCNC: 4.1 MMOL/L (ref 3.5–5.2)
SODIUM BLD-SCNC: 140 MMOL/L (ref 136–145)

## 2017-10-02 PROCEDURE — 99238 HOSP IP/OBS DSCHRG MGMT 30/<: CPT | Performed by: NURSE PRACTITIONER

## 2017-10-02 PROCEDURE — 25010000002 ONDANSETRON PER 1 MG: Performed by: INTERNAL MEDICINE

## 2017-10-02 PROCEDURE — 80048 BASIC METABOLIC PNL TOTAL CA: CPT | Performed by: INTERNAL MEDICINE

## 2017-10-02 RX ORDER — MEXILETINE HYDROCHLORIDE 200 MG/1
200 CAPSULE ORAL 3 TIMES DAILY
Qty: 90 CAPSULE | Refills: 5 | Status: SHIPPED | OUTPATIENT
Start: 2017-10-02 | End: 2017-10-05 | Stop reason: DRUGHIGH

## 2017-10-02 RX ORDER — LISINOPRIL 2.5 MG/1
2.5 TABLET ORAL DAILY
Qty: 90 TABLET | Refills: 3 | Status: SHIPPED | OUTPATIENT
Start: 2017-10-02 | End: 2017-11-03

## 2017-10-02 RX ORDER — AMIODARONE HYDROCHLORIDE 200 MG/1
100 TABLET ORAL DAILY
Start: 2017-10-02 | End: 2018-03-08 | Stop reason: HOSPADM

## 2017-10-02 RX ORDER — AMIODARONE HYDROCHLORIDE 200 MG/1
100 TABLET ORAL DAILY
Status: DISCONTINUED | OUTPATIENT
Start: 2017-10-03 | End: 2017-10-02 | Stop reason: HOSPADM

## 2017-10-02 RX ADMIN — CARVEDILOL 3.12 MG: 3.12 TABLET, FILM COATED ORAL at 09:44

## 2017-10-02 RX ADMIN — DOCUSATE SODIUM 100 MG: 100 CAPSULE, LIQUID FILLED ORAL at 13:25

## 2017-10-02 RX ADMIN — AMIODARONE HYDROCHLORIDE 200 MG: 200 TABLET ORAL at 09:44

## 2017-10-02 RX ADMIN — CARVEDILOL 3.12 MG: 3.12 TABLET, FILM COATED ORAL at 17:03

## 2017-10-02 RX ADMIN — MEXILETINE HYDROCHLORIDE 200 MG: 200 CAPSULE ORAL at 09:44

## 2017-10-02 RX ADMIN — ONDANSETRON 4 MG: 2 INJECTION INTRAMUSCULAR; INTRAVENOUS at 04:28

## 2017-10-02 RX ADMIN — MEXILETINE HYDROCHLORIDE 200 MG: 200 CAPSULE ORAL at 02:10

## 2017-10-02 RX ADMIN — MEXILETINE HYDROCHLORIDE 200 MG: 200 CAPSULE ORAL at 17:03

## 2017-10-02 NOTE — DISCHARGE SUMMARY
DISCHARGE NOTE    Patient Name: Tiffani Fiore  Age/Sex: 47 y.o. female  : 1970  MRN: [id number]    Date of Discharge:  10/2/2017   Date of Admit: 2017  Encounter Provider: WILD Lyle  Place of Service: [facility] CARDIOLOGY  Patient Care Team:  No Known Provider as PCP - General  No Known Provider as PCP - Family Medicine    Subjective:     Discharge Diagnosis:  Active Problems:    Ventricular tachycardia      Hospital Course:   47 year old female patient of Dr. Yancey's well known to me. She was just discharged on  after undergoing a redo VT ablation. She has previously failed sotalol and was placed on amio but still had recurrent VT although the rate was slower prior to ablation. The ablation was felt to be successful but we continued the amio at discharge with plans to likely stop it in 30 days. However she had more VT on Thursday evening, Friday evening and again on Saturday so she came to the ER. Mexiletine was added and she has not had any more recurrent VT. We are going to decrease her amio to 100mg daily and leave her on mexiletine 200g TID. She is going to call me later in the week and let me know how she is doing and we will have her either do a remote download or come in to the office for device check early next week. She is felt to be stable for discharge home.     Vital Signs  Temp:  [97.8 °F (36.6 °C)-98.1 °F (36.7 °C)] 98.1 °F (36.7 °C)  Heart Rate:  [66-96] 72  Resp:  [16-18] 16  BP: ()/(54-71) 104/66    Intake/Output Summary (Last 24 hours) at 10/02/17 5457  Last data filed at 10/02/17 1800   Gross per 24 hour   Intake              720 ml   Output              400 ml   Net              320 ml       Physical Exam:  Physical Exam   Constitutional: She is oriented to person, place, and time. She appears well-developed and  well-nourished. No distress.   HENT:   Head: Normocephalic and atraumatic.   Eyes: Conjunctivae are normal. No scleral icterus.   Neck: Neck supple.   Cardiovascular: Normal rate, regular rhythm, normal heart sounds and intact distal pulses.    Pulmonary/Chest: Effort normal and breath sounds normal. No respiratory distress.   Abdominal: Soft.   Musculoskeletal: Normal range of motion. She exhibits no edema.   Neurological: She is alert and oriented to person, place, and time.   Skin: Skin is warm and dry.   Right groin site wnl , small dressing intact   Psychiatric: She has a normal mood and affect. Her behavior is normal.   Vitals reviewed.       Labs:     Results from last 7 days  Lab Units 10/02/17  0502 10/01/17  0540 09/30/17  1553 09/30/17  0809   SODIUM mmol/L 140 143 142 140   POTASSIUM mmol/L 4.1 4.2 4.4 4.3   CHLORIDE mmol/L 101 105 104 103   CO2 mmol/L 25.6 24.7 25.2 23.5   BUN mg/dL 15 16 11 11   CREATININE mg/dL 0.90 0.76 0.72 0.69   GLUCOSE mg/dL 131* 100* 93 104*   CALCIUM mg/dL 9.2 9.0 9.2 9.4   AST (SGOT) U/L  --   --   --  13   ALT (SGPT) U/L  --   --   --  15       Results from last 7 days  Lab Units 09/30/17  0809   TROPONIN T ng/mL 0.124*       Results from last 7 days  Lab Units 09/30/17  0809   WBC 10*3/mm3 8.49   HEMOGLOBIN g/dL 13.8   HEMATOCRIT % 41.0   PLATELETS 10*3/mm3 181           Results from last 7 days  Lab Units 10/01/17  0540 09/30/17  0809   MAGNESIUM mg/dL 2.4 2.2                   Discharge Diet:  Regular        Activity at Discharge:  as tolerated    Discharge Medications   Tiffani Fiore   Home Medication Instructions ROBERTH:[id number]    Printed on:10/02/17 214   Medication Information                      ALPRAZolam (XANAX) 0.25 MG tablet  Take  by mouth 2 (Two) Times a Day As Needed for Anxiety (dosage unklnown).             amiodarone (PACERONE) 200 MG tablet  Take 0.5 tablets by mouth Daily.             aspirin 81 MG tablet  Take 81 mg by mouth Daily.              carvedilol (COREG) 3.125 MG tablet  Take 3.125 mg by mouth 2 (Two) Times a Day With Meals.             lisinopril (PRINIVIL,ZESTRIL) 2.5 MG tablet  Take 1 tablet by mouth Daily.             mexiletine (MEXITIL) 200 MG capsule  Take 1 capsule by mouth 3 (Three) Times a Day.                 Discharge disposition: home    Follow-up Appointments  Follow-up Information     Follow up with WILD Lyle Follow up in 3 day(s).    Specialty:  Cardiology    Why:  Call me later this week to let me know how you are doing    Contact information:    [address] [address] [phone]          Follow up with [facility] Follow up in 1 week(s).    Why:  do a down load or come in to have device check         Follow up with No Known Provider .    Contact information:    [facility] [address]          Future Appointments  Date Time Provider Department Center   11/22/2017 12:00 AM WILDER REMOTE, BRIDGET DOWNEY CD [facility] None   6/22/2018 8:30 AM WILDER IN OFFICE, BRIDGET DOWNEY CD [facility] None   6/22/2018 9:00 AM MD SHAYAN Rees [facility] None         WILD Lyle  10/02/17  9:47 PM

## 2017-10-02 NOTE — PLAN OF CARE
Problem: Patient Care Overview (Adult)  Goal: Plan of Care Review  Outcome: Ongoing (interventions implemented as appropriate)    10/02/17 0500   Outcome Evaluation   Outcome Summary/Follow up Plan Pt had an episode of nausea relieved by PRN Zofran. Pt feels constipated - prune juice given. Pacemaker sensing and controlling HR. Will continue to monitor.    Coping/Psychosocial Response Interventions   Plan Of Care Reviewed With patient   Patient Care Overview   Progress no change       Goal: Adult Individualization and Mutuality  Outcome: Ongoing (interventions implemented as appropriate)  Goal: Discharge Needs Assessment  Outcome: Ongoing (interventions implemented as appropriate)    Problem: Arrhythmia/Dysrhythmia (Symptomatic) (Adult)  Goal: Signs and Symptoms of Listed Potential Problems Will be Absent or Manageable (Arrhythmia/Dysrhythmia)  Outcome: Ongoing (interventions implemented as appropriate)

## 2017-10-02 NOTE — CONSULTS
"Adult Nutrition  Assessment/PES    Patient Name:  Tiffani Fiore  YOB: 1970  MRN: [id number]  Admit Date:  9/30/2017    Assessment Date:  10/2/2017        Reason for Assessment       10/02/17 1106    Reason for Assessment    Reason For Assessment/Visit identified at risk by screening criteria    Identified At Risk By Screening Criteria reduced oral intake over the last month;unintentional loss of 10 lbs or more in the past 2 mos    Diagnosis Diagnosis    Other diagnosis ventricular tachycardia              Nutrition/Diet History       10/02/17 1106    Nutrition/Diet History    Typical Food/Fluid Intake decrease po bettina to medication side effects            Anthropometrics       10/02/17 1107    Anthropometrics    Height 167.6 cm (65.98\")    RD Documented Current Weight  84.4 kg (186 lb 1.6 oz)    Ideal Body Weight (IBW)    Ideal Body Weight (IBW), Female 59.94    Usual Body Weight (UBW)    Weight Loss 4.536 kg (10 lb)    % Weight Loss  5.1 %    Weight Loss Time Frame 6 mo    Body Mass Index (BMI)    BMI Grade 30 - 34.9- obesity - grade I            Labs/Tests/Procedures/Meds       10/02/17 1108    Labs/Tests/Procedures/Meds    Diagnostic Test/Procedure Review reviewed    Labs/Tests Review Reviewed;Glucose    Medication Review Reviewed, pertinent    Significant Vitals reviewed            Physical Findings       10/02/17 1108    Physical Findings/Assessment    Additional Documentation Physical Appearance (Group)    Physical Appearance    Overall Physical Appearance obese    Skin --   intact            Estimated/Assessed Needs       10/02/17 1109    Calculation Measurements    Weight Used For Calculations 84.4 kg (186 lb 1.1 oz)    Estimated/Assessed Energy Needs    Energy Need Method Kcal/kg    kcal/kg 25    25 Kcal/Kg (kcal) 2110    Estimated/Assessed Protein Needs    Weight Used for Protein Calculation 84.4 kg (186 lb 1.1 oz)    Protein (gm/kg) 1.0    1.0 Gm Protein (gm) 84.4    Estimated/Assessed " Fluid Needs    Fluid Need Method RDA method    RDA Method (mL)  2110            Nutrition Prescription Ordered       10/02/17 1109    Nutrition Prescription PO    Current PO Diet Regular            Evaluation of Received Nutrient/Fluid Intake       10/02/17 1110    PO Evaluation    Number of Meals 2    % PO Intake 100%              Problem/Interventions:        Problem 1       10/02/17 1110    Nutrition Diagnoses Problem 1    Problem 1 Unintended Weight Loss    Etiology (related to) Medical Diagnosis   ventricular tachycardia                    Intervention Goal       10/02/17 1111    Intervention Goal    General Maintain nutrition    PO Maintain intake    Weight No significant weight loss            Nutrition Intervention       10/02/17 1111    Nutrition Intervention    RD/Tech Action Follow Tx progress;Care plan reviewd;Interview for preference              Education/Evaluation       10/02/17 1111    Education    Education Will Instruct as appropriate    Monitor/Evaluation    Monitor Per protocol            Electronically signed by:  Radha Shrestha RD  10/02/17 11:12 AM

## 2017-10-02 NOTE — PROGRESS NOTES
Electrophysiology Follow-Up Note      Patient Name: Tiffani Fiore  Age/Sex: 47 y.o. female  : 1970  MRN: [id number]      Day of Service: 10/02/17       Chief Complaint/Follow-up: VT, Recent redo VT ablation      S: Constipated      Temp:  [97.8 °F (36.6 °C)-97.9 °F (36.6 °C)] 97.8 °F (36.6 °C)  Heart Rate:  [66-96] 75  Resp:  [16-18] 16  BP: ()/(53-85) 103/67     PHYSICAL EXAM:    General Appearance: No acute distress, well developed and well nourished.   Eyes: Conjunctiva and lids: No erythema, swelling, or discharge. Sclera non-icteric.   HENT: Atraumatic, normocephalic. External eyes, ears, and nose normal. Mucous membranes normal. Lips not cyanotic. Neck supple with no tenderness.  Respiratory: No signs of respiratory distress. Respiration rhythm and depth normal.   Clear to auscultation. No rales, crackles, rhonchi, or wheezing auscultated.   Cardiovascular:  Heart Rate and Rhythm: Normal, Heart Sounds: Normal S1 and S2. No S3 or S4 noted.  Murmurs: No murmurs noted. No rubs, thrills, or gallops.   Arterial Pulses: Posterior tibialis and dorsalis pedis pulses normal.   Lower Extremities: No edema noted.  Gastrointestinal:  Abdomen soft, non-distended, non-tender. Normal bowel sounds.  Musculoskeletal: Normal movement of extremities  Skin and Nails: General appearance normal. No pallor, cyanosis, diaphoresis. Skin temperature normal. No clubbing of fingernails.   Psychiatric: Patient alert and oriented to person, place, and time. Speech and behavior appropriate. Normal mood and affect.       ECG/TELE: ASVP        Results from last 7 days  Lab Units 10/02/17  0502 10/01/17  0540 17  1553   SODIUM mmol/L 140 143 142   POTASSIUM mmol/L 4.1 4.2 4.4   CHLORIDE mmol/L 101 105 104   CO2 mmol/L 25.6 24.7 25.2   BUN mg/dL 15 16 11   CREATININE mg/dL 0.90 0.76 0.72   GLUCOSE mg/dL 131* 100* 93   CALCIUM mg/dL 9.2 9.0 9.2       Results from last 7 days  Lab Units 17  0809   WBC  10*3/mm3 8.49   HEMOGLOBIN g/dL 13.8   HEMATOCRIT % 41.0   PLATELETS 10*3/mm3 181           Results from last 7 days  Lab Units 09/30/17  0809   TROPONIN T ng/mL 0.124*         Current Medications:   Scheduled Meds:  amiodarone 200 mg Oral Daily   carvedilol 3.125 mg Oral BID With Meals   enoxaparin 40 mg Subcutaneous Daily   mexiletine 200 mg Oral Q8H         Active Problems:    Ventricular tachycardia       Plan:     -Recurrent VT, s/p redo VT ablation 9/27 (failed sotalol and had recurrence on amio but we continued it after the ablation), came in with VT, mexiletine added-no more VT since admission  -NICM, s/p CRT-D  -Hx PAT, none noted on device check  -Constipated>meds/enema given    Will discharge home today see discharge note. Decrease amio to 100m daily    Maci Menchaca, WILD  10/02/17  12:52 PM

## 2017-10-02 NOTE — PLAN OF CARE
Problem: Nutrition, Imbalanced: Inadequate Oral Intake (Adult)  Goal: Improved Oral Intake  Outcome: Ongoing (interventions implemented as appropriate)    10/02/17 1412   Nutrition, Imbalanced: Inadequate Oral Intake (Adult)   Improved Oral Intake making progress toward outcome  (po encouraged)

## 2017-10-02 NOTE — PLAN OF CARE
Problem: Patient Care Overview (Adult)  Goal: Plan of Care Review  Outcome: Outcome(s) achieved Date Met:  10/02/17    10/02/17 1718   Outcome Evaluation   Outcome Summary/Follow up Plan vitals stable. no tvach today. pt c/o constipation - enema given. pt discharged to home    Coping/Psychosocial Response Interventions   Plan Of Care Reviewed With patient   Patient Care Overview   Progress improving       Goal: Adult Individualization and Mutuality  Outcome: Ongoing (interventions implemented as appropriate)  Goal: Discharge Needs Assessment  Outcome: Ongoing (interventions implemented as appropriate)    Problem: Arrhythmia/Dysrhythmia (Symptomatic) (Adult)  Goal: Signs and Symptoms of Listed Potential Problems Will be Absent or Manageable (Arrhythmia/Dysrhythmia)  Outcome: Ongoing (interventions implemented as appropriate)    Problem: Nutrition, Imbalanced: Inadequate Oral Intake (Adult)  Goal: Identify Related Risk Factors and Signs and Symptoms  Outcome: Ongoing (interventions implemented as appropriate)  Goal: Improved Oral Intake  Outcome: Ongoing (interventions implemented as appropriate)  Goal: Prevent Further Weight Loss  Outcome: Ongoing (interventions implemented as appropriate)

## 2017-10-05 ENCOUNTER — TELEPHONE (OUTPATIENT)
Dept: CARDIOLOGY | Facility: CLINIC | Age: 47
End: 2017-10-05

## 2017-10-05 RX ORDER — MEXILETINE HYDROCHLORIDE 150 MG/1
150 CAPSULE ORAL 3 TIMES DAILY
Qty: 90 CAPSULE | Refills: 5 | Status: SHIPPED | OUTPATIENT
Start: 2017-10-05 | End: 2017-11-27 | Stop reason: SDUPTHER

## 2017-10-05 NOTE — TELEPHONE ENCOUNTER
Pt called and stated that Maci requested her to send a manual transmission. I allowed a manual transmission and reviewed the information. Presenting rhythm is AS/BVP at 67 bpm. Automated testing, that's on, is WNL showing appropriate DDD CRT-D function. AP 16% and BVP 95%.     28 episodes in VT-1 zone (140 - 180 bpm-- no therapy in VT-1 zone) or nonsustained VT since VT ablation on 9/27. There are 9 with EGMs. on 9/29 starting at 2130 there is a 35 min and 9 min episode of VT averaging between 158 - 162 bpm.  There was then a 40 min episode of sustained VT with average rate of 158 bpm on 9/30 @ 0310. Pt did present to the ED that evening. Episode 114 is questionable for V-lead noise. Episode 115 is V-lead noise. Episode 196 initially shows V-noise but then VT. and Episode 197 shows 8 beat run of VT at 185bpm. There is also a PMT episode that I do not believe is true PMT and is just tracking at upper rate. Pt stated she has started her mexiletine as prescribed.  While charting I do see that mexiletine was decreased to 150mg TID due to nausea.     I left the strips in your inbox.

## 2017-10-06 ENCOUNTER — TELEPHONE (OUTPATIENT)
Dept: CARDIOLOGY | Facility: CLINIC | Age: 47
End: 2017-10-06

## 2017-10-06 NOTE — TELEPHONE ENCOUNTER
10 episodes recorded; 2 with EGMs.  Both EGMs actually look like V-lead noise.  I printed the strips and they are on your desk.  I looked back at the charting over the past 1.5 years and see no mention of lead noise. RV lead impedance is stable in the 600 ohm range. Thresholds have not increased.

## 2017-10-06 NOTE — TELEPHONE ENCOUNTER
Noise on RV lead noted on 10/4. Spoke with her, her nausea is a little better and she is coming in on Monday at 9 am to have device checked.

## 2017-10-09 ENCOUNTER — CLINICAL SUPPORT NO REQUIREMENTS (OUTPATIENT)
Dept: CARDIOLOGY | Facility: CLINIC | Age: 47
End: 2017-10-09

## 2017-10-09 DIAGNOSIS — I42.8 NONISCHEMIC CARDIOMYOPATHY (HCC): Primary | ICD-10-CM

## 2017-10-09 PROCEDURE — 93284 PRGRMG EVAL IMPLANTABLE DFB: CPT | Performed by: INTERNAL MEDICINE

## 2017-10-10 ENCOUNTER — TELEPHONE (OUTPATIENT)
Dept: CARDIOLOGY | Facility: CLINIC | Age: 47
End: 2017-10-10

## 2017-10-10 NOTE — TELEPHONE ENCOUNTER
Called and informed pt's disability that pt is having a procedure and will be off work an additional 2 week min for this. NED Perdomo, also informed them we would call back with any updates...Jeannette    Contact #  [phone]

## 2017-10-11 ENCOUNTER — TELEPHONE (OUTPATIENT)
Dept: CARDIOLOGY | Facility: CLINIC | Age: 47
End: 2017-10-11

## 2017-10-11 ENCOUNTER — CLINICAL SUPPORT NO REQUIREMENTS (OUTPATIENT)
Dept: CARDIOLOGY | Facility: CLINIC | Age: 47
End: 2017-10-11

## 2017-10-11 DIAGNOSIS — I47.20 VENTRICULAR TACHYCARDIA (HCC): Primary | ICD-10-CM

## 2017-10-11 NOTE — TELEPHONE ENCOUNTER
LUI SALBERTO Machado I spoke with Viki, our hospital  and she states she will call patient today./ KRISTOPHER

## 2017-10-11 NOTE — TELEPHONE ENCOUNTER
----- Message from Tiffani Fiore sent at 10/11/2017  8:09 AM EDT -----  Regarding: Non-Urgent Medical Question  Contact: [phone]  Robert Fitzpatrick how are you today? I received an appointment for blood work on the 19th, am I to assume that I will not be having surgery this week as previously discussed? Has the surgery date been set? I have received no communication from scheduling so I am curious. I know that Dr. Yancey stated he wanted the surgery this week. Can you please confer with Diana and let me know what date and time my surgery will take place as I need to make sure I have transportation for this day.    Thanks for all you do,    Tiffani Fiore    ##Thank you for calling on my [employer] and continuing my short term disabilities...Your awesome!

## 2017-10-11 NOTE — TELEPHONE ENCOUNTER
Pt sent a manual transmission as she was having VT this AM. She stated she is taking her Mexitil 150mg TID and nausea has been better.     Presenting rhythm AS/BVP @ 80 bpm. Automated testing, that's on is WNL. AP 12% and BVP 94%.     Since 10/9:    13 VT-NS episodes lasting 12 - 17 seconds; 2 with EGMs. Both EGMs show V-lead noise. Pt is scheduled for RV lead extraction on 10/20.     2 VT zone 1 episodes, both with EGMs. Both show VT lasting 3 min 43 seconds and 4 minutes. Ventricular rate around 160 bpm.      I left the strips at your desk.  Can you please look at the VT episode to see why pt has a LVS roughly 80 ms prior to RVS beat?  LV lead is programmed to LV sense from LVtip>>RV.   Thanks!

## 2017-10-11 NOTE — TELEPHONE ENCOUNTER
Pt was seen in clinic on Monday. Plan is for RV lead extraction with new RV lead, scheduled 10/20.

## 2017-10-11 NOTE — TELEPHONE ENCOUNTER
I spoke with our hospital  who scheduled pt for 10/20 due to availability in the hospital. Dr. Yancey has been notified and schedule has been discussed with Dr. Salgado.  I did speak with pt about this as well.

## 2017-10-13 NOTE — TELEPHONE ENCOUNTER
[facility]    Tiffani Fiore   to Maci Menchaca, APRN       6:26 PM   Diana Fitzpatrick called yesterday and left a message for me to increase my carvedilol. Please let her know message was received and will monitor myself for and increased dizziness.     Have a great day     Tiffani Fiore

## 2017-10-19 ENCOUNTER — APPOINTMENT (OUTPATIENT)
Dept: PREADMISSION TESTING | Facility: HOSPITAL | Age: 47
End: 2017-10-19

## 2017-10-19 VITALS
RESPIRATION RATE: 16 BRPM | WEIGHT: 193 LBS | SYSTOLIC BLOOD PRESSURE: 99 MMHG | DIASTOLIC BLOOD PRESSURE: 68 MMHG | HEART RATE: 70 BPM | BODY MASS INDEX: 32.15 KG/M2 | OXYGEN SATURATION: 98 % | HEIGHT: 65 IN | TEMPERATURE: 98 F

## 2017-10-19 LAB
ANION GAP SERPL CALCULATED.3IONS-SCNC: 13.6 MMOL/L
BASOPHILS # BLD AUTO: 0.02 10*3/MM3 (ref 0–0.2)
BASOPHILS NFR BLD AUTO: 0.3 % (ref 0–1.5)
BUN BLD-MCNC: 16 MG/DL (ref 6–20)
BUN/CREAT SERPL: 21.9 (ref 7–25)
CALCIUM SPEC-SCNC: 9 MG/DL (ref 8.6–10.5)
CHLORIDE SERPL-SCNC: 103 MMOL/L (ref 98–107)
CO2 SERPL-SCNC: 23.4 MMOL/L (ref 22–29)
CREAT BLD-MCNC: 0.73 MG/DL (ref 0.57–1)
DEPRECATED RDW RBC AUTO: 43.1 FL (ref 37–54)
EOSINOPHIL # BLD AUTO: 0.12 10*3/MM3 (ref 0–0.7)
EOSINOPHIL NFR BLD AUTO: 1.7 % (ref 0.3–6.2)
ERYTHROCYTE [DISTWIDTH] IN BLOOD BY AUTOMATED COUNT: 12.6 % (ref 11.7–13)
GFR SERPL CREATININE-BSD FRML MDRD: 85 ML/MIN/1.73
GLUCOSE BLD-MCNC: 123 MG/DL (ref 65–99)
HCT VFR BLD AUTO: 39.8 % (ref 35.6–45.5)
HGB BLD-MCNC: 13.3 G/DL (ref 11.9–15.5)
IMM GRANULOCYTES # BLD: 0 10*3/MM3 (ref 0–0.03)
IMM GRANULOCYTES NFR BLD: 0 % (ref 0–0.5)
LYMPHOCYTES # BLD AUTO: 1.75 10*3/MM3 (ref 0.9–4.8)
LYMPHOCYTES NFR BLD AUTO: 24.6 % (ref 19.6–45.3)
MCH RBC QN AUTO: 31.7 PG (ref 26.9–32)
MCHC RBC AUTO-ENTMCNC: 33.4 G/DL (ref 32.4–36.3)
MCV RBC AUTO: 95 FL (ref 80.5–98.2)
MONOCYTES # BLD AUTO: 0.57 10*3/MM3 (ref 0.2–1.2)
MONOCYTES NFR BLD AUTO: 8 % (ref 5–12)
NEUTROPHILS # BLD AUTO: 4.66 10*3/MM3 (ref 1.9–8.1)
NEUTROPHILS NFR BLD AUTO: 65.4 % (ref 42.7–76)
PLATELET # BLD AUTO: 173 10*3/MM3 (ref 140–500)
PMV BLD AUTO: 10.8 FL (ref 6–12)
POTASSIUM BLD-SCNC: 4.1 MMOL/L (ref 3.5–5.2)
RBC # BLD AUTO: 4.19 10*6/MM3 (ref 3.9–5.2)
SODIUM BLD-SCNC: 140 MMOL/L (ref 136–145)
WBC NRBC COR # BLD: 7.12 10*3/MM3 (ref 4.5–10.7)

## 2017-10-19 RX ORDER — CARVEDILOL 6.25 MG/1
6.25 TABLET ORAL EVERY MORNING
COMMUNITY
End: 2017-11-03 | Stop reason: SDUPTHER

## 2017-10-19 NOTE — DISCHARGE INSTRUCTIONS
Take the following medications the morning of surgery with a small sip of water:    CARVEDILOL  AMIODARONE  MEXILETINE    BRING YOUR LISINOPRIL DO NOT TAKE!!    General Instructions:  • Do not eat or drink anything after midnight the night before surgery.  • Patients who avoid smoking, chewing tobacco and alcohol for 4 weeks prior to surgery have a reduced risk of post-operative complications.  Quit smoking as many days before surgery as you can.  • Do not smoke, use chewing tobacco or drink alcohol the day of surgery.   • Bring any papers given to you in the doctor’s office.  • Wear clean comfortable clothes and socks.  • Do not wear contact lenses or make-up.  Bring a case for your glasses.   • Bring crutches or walker if applicable.  • Remove all piercings.  Leave jewelry and any other valuables at home.  • The Pre-Admission Testing nurse will instruct you to bring medications if unable to obtain an accurate list in Pre-Admission Testing.        Preventing a Surgical Site Infection:  • For 2 to 3 days before surgery, avoid shaving with a razor because the razor can irritate skin and make it easier to develop an infection.  • The night prior to surgery sleep in a clean bed with clean clothing.  Do not allow pets to sleep with you.  • Shower on the morning of surgery using a fresh bar of anti-bacterial soap (such as Dial) and clean washcloth.  Dry with a clean towel and dress in clean clothing.  • Ask your surgeon if you will be receiving antibiotics prior to surgery.  • Make sure you, your family, and all healthcare providers clean their hands with soap and water or an alcohol based hand  before caring for you or your wound.    Day of surgery: 10- ARRIVE @ 0830 AM REPORT TO THE MAIN OR   Upon arrival, a Pre-op nurse and Anesthesiologist will review your health history, obtain vital signs, and answer questions you may have.  The only belongings needed at this time will be your home medications.  If  you are staying overnight your family can leave the rest of your belongings in the car and bring them to your room later.  A Pre-op nurse will start an IV and you may receive medication in preparation for surgery, including something to help you relax.  Your family will be able to see you in the Pre-op area.  While you are in surgery your family should notify the waiting room  if they leave the waiting room area and provide a contact phone number.    Please be aware that surgery does come with discomfort.  We want to make every effort to control your discomfort so please discuss any uncontrolled symptoms with your nurse.   Your doctor will most likely have prescribed pain medications.      If you are going home after surgery you will receive individualized written care instructions before being discharged.  A responsible adult must drive you to and from the hospital on the day of your surgery and stay with you for 24 hours.    If you are staying overnight following surgery, you will be transported to your hospital room following the recovery period.  [facility] has all private rooms.    If you have any questions please call Pre-Admission Testing at [phone].  Deductibles and co-payments are collected on the day of service. Please be prepared to pay the required co-pay, deductible or deposit on the day of service as defined by your plan.

## 2017-10-20 ENCOUNTER — ANESTHESIA (OUTPATIENT)
Dept: PERIOP | Facility: HOSPITAL | Age: 47
End: 2017-10-20

## 2017-10-20 ENCOUNTER — ANESTHESIA EVENT (OUTPATIENT)
Dept: PERIOP | Facility: HOSPITAL | Age: 47
End: 2017-10-20

## 2017-10-20 ENCOUNTER — HOSPITAL ENCOUNTER (OUTPATIENT)
Facility: HOSPITAL | Age: 47
Setting detail: OBSERVATION
Discharge: HOME | End: 2017-10-21
Attending: INTERNAL MEDICINE | Admitting: INTERNAL MEDICINE

## 2017-10-20 ENCOUNTER — APPOINTMENT (OUTPATIENT)
Dept: GENERAL RADIOLOGY | Facility: HOSPITAL | Age: 47
End: 2017-10-20

## 2017-10-20 PROBLEM — I42.9 CARDIOMYOPATHY (HCC): Status: ACTIVE | Noted: 2017-10-20

## 2017-10-20 LAB
ABO GROUP BLD: NORMAL
APTT PPP: 28.8 SECONDS (ref 22.7–35.4)
BLD GP AB SCN SERPL QL: NEGATIVE
INR PPP: 1.18 (ref 0.9–1.1)
PROTHROMBIN TIME: 14.6 SECONDS (ref 11.7–14.2)
RH BLD: POSITIVE

## 2017-10-20 PROCEDURE — 85610 PROTHROMBIN TIME: CPT | Performed by: INTERNAL MEDICINE

## 2017-10-20 PROCEDURE — 25010000002 HEPARIN (PORCINE) PER 1000 UNITS: Performed by: INTERNAL MEDICINE

## 2017-10-20 PROCEDURE — 25010000002 PHENYLEPHRINE PER 1 ML: Performed by: ANESTHESIOLOGY

## 2017-10-20 PROCEDURE — C2629 INTRO/SHEATH, LASER: HCPCS | Performed by: INTERNAL MEDICINE

## 2017-10-20 PROCEDURE — C1887 CATHETER, GUIDING: HCPCS | Performed by: INTERNAL MEDICINE

## 2017-10-20 PROCEDURE — 25010000002 MIDAZOLAM PER 1 MG: Performed by: ANESTHESIOLOGY

## 2017-10-20 PROCEDURE — 25010000002 FENTANYL CITRATE (PF) 100 MCG/2ML SOLUTION: Performed by: ANESTHESIOLOGY

## 2017-10-20 PROCEDURE — C1769 GUIDE WIRE: HCPCS | Performed by: INTERNAL MEDICINE

## 2017-10-20 PROCEDURE — 25010000002 PROMETHAZINE PER 50 MG: Performed by: ANESTHESIOLOGY

## 2017-10-20 PROCEDURE — 25010000002 ONDANSETRON PER 1 MG: Performed by: ANESTHESIOLOGY

## 2017-10-20 PROCEDURE — 86901 BLOOD TYPING SEROLOGIC RH(D): CPT

## 2017-10-20 PROCEDURE — C1894 INTRO/SHEATH, NON-LASER: HCPCS | Performed by: INTERNAL MEDICINE

## 2017-10-20 PROCEDURE — C1893 INTRO/SHEATH, FIXED,NON-PEEL: HCPCS | Performed by: INTERNAL MEDICINE

## 2017-10-20 PROCEDURE — 25010000002 PROPOFOL 10 MG/ML EMULSION: Performed by: ANESTHESIOLOGY

## 2017-10-20 PROCEDURE — C1773 RET DEV, INSERTABLE: HCPCS | Performed by: INTERNAL MEDICINE

## 2017-10-20 PROCEDURE — G0378 HOSPITAL OBSERVATION PER HR: HCPCS

## 2017-10-20 PROCEDURE — 86900 BLOOD TYPING SEROLOGIC ABO: CPT

## 2017-10-20 PROCEDURE — 25010000002 NEOSTIGMINE PER 0.5 MG: Performed by: ANESTHESIOLOGY

## 2017-10-20 PROCEDURE — C1892 INTRO/SHEATH,FIXED,PEEL-AWAY: HCPCS | Performed by: INTERNAL MEDICINE

## 2017-10-20 PROCEDURE — 86923 COMPATIBILITY TEST ELECTRIC: CPT

## 2017-10-20 PROCEDURE — 0 IOPAMIDOL 61 % SOLUTION: Performed by: INTERNAL MEDICINE

## 2017-10-20 PROCEDURE — C1900 LEAD, CORONARY VENOUS: HCPCS | Performed by: INTERNAL MEDICINE

## 2017-10-20 PROCEDURE — 86850 RBC ANTIBODY SCREEN: CPT | Performed by: INTERNAL MEDICINE

## 2017-10-20 PROCEDURE — C1760 CLOSURE DEV, VASC: HCPCS | Performed by: INTERNAL MEDICINE

## 2017-10-20 PROCEDURE — 25010000003 CEFAZOLIN IN DEXTROSE 2-4 GM/100ML-% SOLUTION: Performed by: INTERNAL MEDICINE

## 2017-10-20 PROCEDURE — 25010000002 DEXAMETHASONE PER 1 MG: Performed by: ANESTHESIOLOGY

## 2017-10-20 PROCEDURE — 86900 BLOOD TYPING SEROLOGIC ABO: CPT | Performed by: INTERNAL MEDICINE

## 2017-10-20 PROCEDURE — C1777 LEAD, AICD, ENDO SINGLE COIL: HCPCS | Performed by: INTERNAL MEDICINE

## 2017-10-20 PROCEDURE — 85730 THROMBOPLASTIN TIME PARTIAL: CPT | Performed by: INTERNAL MEDICINE

## 2017-10-20 PROCEDURE — 86901 BLOOD TYPING SEROLOGIC RH(D): CPT | Performed by: INTERNAL MEDICINE

## 2017-10-20 PROCEDURE — 71010 HC CHEST PA OR AP: CPT

## 2017-10-20 DEVICE — STEROID-ELUTING BIPOLAR PACE/SENSE AND DEFIBRILLATION LEAD
Type: IMPLANTABLE DEVICE | Site: HEART | Status: FUNCTIONAL
Brand: ENDOTAK RELIANCE® SG

## 2017-10-20 DEVICE — CORONARY VENOUS DEXAMETHASONE ACETATE-ELUTING UNIPOLAR PACE/SENSE LEAD
Type: IMPLANTABLE DEVICE | Site: CHEST | Status: FUNCTIONAL
Brand: ACUITY™ SPIRAL

## 2017-10-20 RX ORDER — MORPHINE SULFATE 2 MG/ML
2 INJECTION, SOLUTION INTRAMUSCULAR; INTRAVENOUS EVERY 4 HOURS PRN
Status: DISCONTINUED | OUTPATIENT
Start: 2017-10-20 | End: 2017-10-21 | Stop reason: HOSPADM

## 2017-10-20 RX ORDER — AMIODARONE HYDROCHLORIDE 200 MG/1
100 TABLET ORAL DAILY
Status: DISCONTINUED | OUTPATIENT
Start: 2017-10-21 | End: 2017-10-21 | Stop reason: HOSPADM

## 2017-10-20 RX ORDER — EPHEDRINE SULFATE 50 MG/ML
5 INJECTION, SOLUTION INTRAVENOUS ONCE AS NEEDED
Status: DISCONTINUED | OUTPATIENT
Start: 2017-10-20 | End: 2017-10-20 | Stop reason: HOSPADM

## 2017-10-20 RX ORDER — GLYCOPYRROLATE 0.2 MG/ML
INJECTION INTRAMUSCULAR; INTRAVENOUS AS NEEDED
Status: DISCONTINUED | OUTPATIENT
Start: 2017-10-20 | End: 2017-10-20 | Stop reason: SURG

## 2017-10-20 RX ORDER — PROMETHAZINE HYDROCHLORIDE 25 MG/ML
12.5 INJECTION, SOLUTION INTRAMUSCULAR; INTRAVENOUS ONCE AS NEEDED
Status: COMPLETED | OUTPATIENT
Start: 2017-10-20 | End: 2017-10-20

## 2017-10-20 RX ORDER — EPHEDRINE SULFATE 50 MG/ML
INJECTION, SOLUTION INTRAVENOUS AS NEEDED
Status: DISCONTINUED | OUTPATIENT
Start: 2017-10-20 | End: 2017-10-20 | Stop reason: SURG

## 2017-10-20 RX ORDER — LIDOCAINE HYDROCHLORIDE 40 MG/ML
SOLUTION TOPICAL AS NEEDED
Status: DISCONTINUED | OUTPATIENT
Start: 2017-10-20 | End: 2017-10-20 | Stop reason: SURG

## 2017-10-20 RX ORDER — NALOXONE HCL 0.4 MG/ML
0.4 VIAL (ML) INJECTION
Status: DISCONTINUED | OUTPATIENT
Start: 2017-10-20 | End: 2017-10-21 | Stop reason: HOSPADM

## 2017-10-20 RX ORDER — FENTANYL CITRATE 50 UG/ML
50 INJECTION, SOLUTION INTRAMUSCULAR; INTRAVENOUS
Status: DISCONTINUED | OUTPATIENT
Start: 2017-10-20 | End: 2017-10-20 | Stop reason: HOSPADM

## 2017-10-20 RX ORDER — HEPARIN SODIUM 5000 [USP'U]/ML
INJECTION, SOLUTION INTRAVENOUS; SUBCUTANEOUS AS NEEDED
Status: DISCONTINUED | OUTPATIENT
Start: 2017-10-20 | End: 2017-10-20 | Stop reason: HOSPADM

## 2017-10-20 RX ORDER — ONDANSETRON 2 MG/ML
4 INJECTION INTRAMUSCULAR; INTRAVENOUS ONCE AS NEEDED
Status: DISCONTINUED | OUTPATIENT
Start: 2017-10-20 | End: 2017-10-20 | Stop reason: HOSPADM

## 2017-10-20 RX ORDER — LIDOCAINE HYDROCHLORIDE 20 MG/ML
INJECTION, SOLUTION INFILTRATION; PERINEURAL AS NEEDED
Status: DISCONTINUED | OUTPATIENT
Start: 2017-10-20 | End: 2017-10-20 | Stop reason: SURG

## 2017-10-20 RX ORDER — FAMOTIDINE 10 MG/ML
20 INJECTION, SOLUTION INTRAVENOUS ONCE
Status: COMPLETED | OUTPATIENT
Start: 2017-10-20 | End: 2017-10-20

## 2017-10-20 RX ORDER — SODIUM CHLORIDE, SODIUM LACTATE, POTASSIUM CHLORIDE, CALCIUM CHLORIDE 600; 310; 30; 20 MG/100ML; MG/100ML; MG/100ML; MG/100ML
9 INJECTION, SOLUTION INTRAVENOUS CONTINUOUS
Status: DISCONTINUED | OUTPATIENT
Start: 2017-10-20 | End: 2017-10-21 | Stop reason: HOSPADM

## 2017-10-20 RX ORDER — ONDANSETRON 2 MG/ML
4 INJECTION INTRAMUSCULAR; INTRAVENOUS EVERY 6 HOURS PRN
Status: DISCONTINUED | OUTPATIENT
Start: 2017-10-20 | End: 2017-10-21 | Stop reason: HOSPADM

## 2017-10-20 RX ORDER — PROMETHAZINE HYDROCHLORIDE 25 MG/1
25 TABLET ORAL ONCE AS NEEDED
Status: COMPLETED | OUTPATIENT
Start: 2017-10-20 | End: 2017-10-20

## 2017-10-20 RX ORDER — ROCURONIUM BROMIDE 10 MG/ML
INJECTION, SOLUTION INTRAVENOUS AS NEEDED
Status: DISCONTINUED | OUTPATIENT
Start: 2017-10-20 | End: 2017-10-20 | Stop reason: SURG

## 2017-10-20 RX ORDER — SODIUM CHLORIDE 0.9 % (FLUSH) 0.9 %
1-10 SYRINGE (ML) INJECTION AS NEEDED
Status: DISCONTINUED | OUTPATIENT
Start: 2017-10-20 | End: 2017-10-21 | Stop reason: HOSPADM

## 2017-10-20 RX ORDER — CARVEDILOL 3.12 MG/1
3.12 TABLET ORAL EVERY EVENING
Status: DISCONTINUED | OUTPATIENT
Start: 2017-10-20 | End: 2017-10-21 | Stop reason: HOSPADM

## 2017-10-20 RX ORDER — HYDROCODONE BITARTRATE AND ACETAMINOPHEN 7.5; 325 MG/1; MG/1
1 TABLET ORAL ONCE AS NEEDED
Status: DISCONTINUED | OUTPATIENT
Start: 2017-10-20 | End: 2017-10-20 | Stop reason: HOSPADM

## 2017-10-20 RX ORDER — SODIUM CHLORIDE 0.9 % (FLUSH) 0.9 %
1-10 SYRINGE (ML) INJECTION AS NEEDED
Status: DISCONTINUED | OUTPATIENT
Start: 2017-10-20 | End: 2017-10-20 | Stop reason: HOSPADM

## 2017-10-20 RX ORDER — MIDAZOLAM HYDROCHLORIDE 1 MG/ML
1 INJECTION INTRAMUSCULAR; INTRAVENOUS
Status: DISCONTINUED | OUTPATIENT
Start: 2017-10-20 | End: 2017-10-20 | Stop reason: HOSPADM

## 2017-10-20 RX ORDER — MIDAZOLAM HYDROCHLORIDE 1 MG/ML
2 INJECTION INTRAMUSCULAR; INTRAVENOUS
Status: DISCONTINUED | OUTPATIENT
Start: 2017-10-20 | End: 2017-10-20 | Stop reason: HOSPADM

## 2017-10-20 RX ORDER — MEXILETINE HYDROCHLORIDE 150 MG/1
150 CAPSULE ORAL 3 TIMES DAILY
Status: DISCONTINUED | OUTPATIENT
Start: 2017-10-20 | End: 2017-10-21 | Stop reason: HOSPADM

## 2017-10-20 RX ORDER — SODIUM CHLORIDE 9 MG/ML
INJECTION, SOLUTION INTRAVENOUS CONTINUOUS PRN
Status: DISCONTINUED | OUTPATIENT
Start: 2017-10-20 | End: 2017-10-20 | Stop reason: SURG

## 2017-10-20 RX ORDER — PROMETHAZINE HYDROCHLORIDE 25 MG/1
12.5 TABLET ORAL ONCE AS NEEDED
Status: DISCONTINUED | OUTPATIENT
Start: 2017-10-20 | End: 2017-10-20 | Stop reason: HOSPADM

## 2017-10-20 RX ORDER — PROPOFOL 10 MG/ML
VIAL (ML) INTRAVENOUS AS NEEDED
Status: DISCONTINUED | OUTPATIENT
Start: 2017-10-20 | End: 2017-10-20 | Stop reason: SURG

## 2017-10-20 RX ORDER — LISINOPRIL 2.5 MG/1
2.5 TABLET ORAL DAILY
Status: DISCONTINUED | OUTPATIENT
Start: 2017-10-20 | End: 2017-10-21 | Stop reason: HOSPADM

## 2017-10-20 RX ORDER — HYDROMORPHONE HYDROCHLORIDE 1 MG/ML
0.5 INJECTION, SOLUTION INTRAMUSCULAR; INTRAVENOUS; SUBCUTANEOUS
Status: DISCONTINUED | OUTPATIENT
Start: 2017-10-20 | End: 2017-10-20 | Stop reason: HOSPADM

## 2017-10-20 RX ORDER — HYDRALAZINE HYDROCHLORIDE 20 MG/ML
5 INJECTION INTRAMUSCULAR; INTRAVENOUS
Status: DISCONTINUED | OUTPATIENT
Start: 2017-10-20 | End: 2017-10-20 | Stop reason: HOSPADM

## 2017-10-20 RX ORDER — DIPHENHYDRAMINE HYDROCHLORIDE 50 MG/ML
12.5 INJECTION INTRAMUSCULAR; INTRAVENOUS
Status: DISCONTINUED | OUTPATIENT
Start: 2017-10-20 | End: 2017-10-20 | Stop reason: HOSPADM

## 2017-10-20 RX ORDER — ONDANSETRON 2 MG/ML
INJECTION INTRAMUSCULAR; INTRAVENOUS AS NEEDED
Status: DISCONTINUED | OUTPATIENT
Start: 2017-10-20 | End: 2017-10-20 | Stop reason: SURG

## 2017-10-20 RX ORDER — CEFAZOLIN SODIUM 2 G/100ML
2 INJECTION, SOLUTION INTRAVENOUS
Status: COMPLETED | OUTPATIENT
Start: 2017-10-20 | End: 2017-10-20

## 2017-10-20 RX ORDER — ALPRAZOLAM 0.25 MG/1
0.25 TABLET ORAL 2 TIMES DAILY PRN
Status: DISCONTINUED | OUTPATIENT
Start: 2017-10-20 | End: 2017-10-21 | Stop reason: HOSPADM

## 2017-10-20 RX ORDER — CHLORHEXIDINE GLUCONATE 0.12 MG/ML
15 RINSE ORAL EVERY 12 HOURS SCHEDULED
Status: DISPENSED | OUTPATIENT
Start: 2017-10-20 | End: 2017-10-21

## 2017-10-20 RX ORDER — LABETALOL HYDROCHLORIDE 5 MG/ML
5 INJECTION, SOLUTION INTRAVENOUS
Status: DISCONTINUED | OUTPATIENT
Start: 2017-10-20 | End: 2017-10-20 | Stop reason: HOSPADM

## 2017-10-20 RX ORDER — OXYCODONE AND ACETAMINOPHEN 7.5; 325 MG/1; MG/1
1 TABLET ORAL ONCE AS NEEDED
Status: DISCONTINUED | OUTPATIENT
Start: 2017-10-20 | End: 2017-10-20 | Stop reason: HOSPADM

## 2017-10-20 RX ORDER — OXYCODONE HYDROCHLORIDE AND ACETAMINOPHEN 5; 325 MG/1; MG/1
1 TABLET ORAL EVERY 4 HOURS PRN
Status: DISCONTINUED | OUTPATIENT
Start: 2017-10-20 | End: 2017-10-21 | Stop reason: HOSPADM

## 2017-10-20 RX ORDER — FLUMAZENIL 0.1 MG/ML
0.2 INJECTION INTRAVENOUS AS NEEDED
Status: DISCONTINUED | OUTPATIENT
Start: 2017-10-20 | End: 2017-10-20 | Stop reason: HOSPADM

## 2017-10-20 RX ORDER — PROMETHAZINE HYDROCHLORIDE 25 MG/1
25 SUPPOSITORY RECTAL ONCE AS NEEDED
Status: COMPLETED | OUTPATIENT
Start: 2017-10-20 | End: 2017-10-20

## 2017-10-20 RX ORDER — ASPIRIN 81 MG/1
81 TABLET, CHEWABLE ORAL DAILY
Status: DISCONTINUED | OUTPATIENT
Start: 2017-10-20 | End: 2017-10-21 | Stop reason: HOSPADM

## 2017-10-20 RX ORDER — CARVEDILOL 6.25 MG/1
6.25 TABLET ORAL EVERY MORNING
Status: DISCONTINUED | OUTPATIENT
Start: 2017-10-21 | End: 2017-10-21 | Stop reason: HOSPADM

## 2017-10-20 RX ORDER — DEXAMETHASONE SODIUM PHOSPHATE 10 MG/ML
INJECTION INTRAMUSCULAR; INTRAVENOUS AS NEEDED
Status: DISCONTINUED | OUTPATIENT
Start: 2017-10-20 | End: 2017-10-20 | Stop reason: SURG

## 2017-10-20 RX ORDER — NALOXONE HCL 0.4 MG/ML
0.2 VIAL (ML) INJECTION AS NEEDED
Status: DISCONTINUED | OUTPATIENT
Start: 2017-10-20 | End: 2017-10-20 | Stop reason: HOSPADM

## 2017-10-20 RX ADMIN — LIDOCAINE HYDROCHLORIDE 100 MG: 20 INJECTION, SOLUTION INFILTRATION; PERINEURAL at 11:24

## 2017-10-20 RX ADMIN — CHLORHEXIDINE GLUCONATE 15 ML: 1.2 RINSE ORAL at 09:52

## 2017-10-20 RX ADMIN — FENTANYL CITRATE 50 MCG: 50 INJECTION INTRAMUSCULAR; INTRAVENOUS at 15:10

## 2017-10-20 RX ADMIN — EPHEDRINE SULFATE 10 MG: 50 INJECTION INTRAMUSCULAR; INTRAVENOUS; SUBCUTANEOUS at 14:50

## 2017-10-20 RX ADMIN — EPHEDRINE SULFATE 10 MG: 50 INJECTION INTRAMUSCULAR; INTRAVENOUS; SUBCUTANEOUS at 13:42

## 2017-10-20 RX ADMIN — FENTANYL CITRATE 25 MCG: 50 INJECTION INTRAMUSCULAR; INTRAVENOUS at 10:35

## 2017-10-20 RX ADMIN — MEXILETINE HYDROCHLORIDE 150 MG: 150 CAPSULE ORAL at 21:20

## 2017-10-20 RX ADMIN — EPHEDRINE SULFATE 10 MG: 50 INJECTION INTRAMUSCULAR; INTRAVENOUS; SUBCUTANEOUS at 11:54

## 2017-10-20 RX ADMIN — CEFAZOLIN SODIUM 2 G: 2 INJECTION, SOLUTION INTRAVENOUS at 11:24

## 2017-10-20 RX ADMIN — EPHEDRINE SULFATE 10 MG: 50 INJECTION INTRAMUSCULAR; INTRAVENOUS; SUBCUTANEOUS at 11:37

## 2017-10-20 RX ADMIN — LISINOPRIL 2.5 MG: 2.5 TABLET ORAL at 21:20

## 2017-10-20 RX ADMIN — PHENYLEPHRINE HYDROCHLORIDE 100 MCG: 10 INJECTION INTRAVENOUS at 15:14

## 2017-10-20 RX ADMIN — SODIUM CHLORIDE: 9 INJECTION, SOLUTION INTRAVENOUS at 12:22

## 2017-10-20 RX ADMIN — CARVEDILOL 3.12 MG: 3.12 TABLET, FILM COATED ORAL at 21:19

## 2017-10-20 RX ADMIN — EPHEDRINE SULFATE 10 MG: 50 INJECTION INTRAMUSCULAR; INTRAVENOUS; SUBCUTANEOUS at 11:26

## 2017-10-20 RX ADMIN — EPHEDRINE SULFATE 10 MG: 50 INJECTION INTRAMUSCULAR; INTRAVENOUS; SUBCUTANEOUS at 13:23

## 2017-10-20 RX ADMIN — ROCURONIUM BROMIDE 50 MG: 10 INJECTION INTRAVENOUS at 11:24

## 2017-10-20 RX ADMIN — SODIUM CHLORIDE, POTASSIUM CHLORIDE, SODIUM LACTATE AND CALCIUM CHLORIDE 9 ML/HR: 600; 310; 30; 20 INJECTION, SOLUTION INTRAVENOUS at 09:52

## 2017-10-20 RX ADMIN — ONDANSETRON 4 MG: 2 INJECTION INTRAMUSCULAR; INTRAVENOUS at 15:05

## 2017-10-20 RX ADMIN — LIDOCAINE HYDROCHLORIDE 1 EACH: 40 SPRAY LARYNGEAL; TRANSTRACHEAL at 11:28

## 2017-10-20 RX ADMIN — SODIUM CHLORIDE, POTASSIUM CHLORIDE, SODIUM LACTATE AND CALCIUM CHLORIDE: 600; 310; 30; 20 INJECTION, SOLUTION INTRAVENOUS at 11:13

## 2017-10-20 RX ADMIN — MUPIROCIN 1 APPLICATION: 20 OINTMENT TOPICAL at 09:52

## 2017-10-20 RX ADMIN — DEXAMETHASONE SODIUM PHOSPHATE 8 MG: 10 INJECTION INTRAMUSCULAR; INTRAVENOUS at 11:38

## 2017-10-20 RX ADMIN — PHENYLEPHRINE HYDROCHLORIDE 100 MCG: 10 INJECTION INTRAVENOUS at 11:26

## 2017-10-20 RX ADMIN — FAMOTIDINE 20 MG: 10 INJECTION, SOLUTION INTRAVENOUS at 10:34

## 2017-10-20 RX ADMIN — EPHEDRINE SULFATE 10 MG: 50 INJECTION INTRAMUSCULAR; INTRAVENOUS; SUBCUTANEOUS at 14:00

## 2017-10-20 RX ADMIN — NEOSTIGMINE METHYLSULFATE 5 MG: 1 INJECTION INTRAMUSCULAR; INTRAVENOUS; SUBCUTANEOUS at 15:06

## 2017-10-20 RX ADMIN — PROMETHAZINE HYDROCHLORIDE 12.5 MG: 25 INJECTION INTRAMUSCULAR; INTRAVENOUS at 15:59

## 2017-10-20 RX ADMIN — GLYCOPYRROLATE 1 MG: 0.2 INJECTION INTRAMUSCULAR; INTRAVENOUS at 15:06

## 2017-10-20 RX ADMIN — FENTANYL CITRATE 100 MCG: 50 INJECTION INTRAMUSCULAR; INTRAVENOUS at 11:24

## 2017-10-20 RX ADMIN — PROPOFOL 200 MG: 10 INJECTION, EMULSION INTRAVENOUS at 11:24

## 2017-10-20 RX ADMIN — MIDAZOLAM 1 MG: 1 INJECTION INTRAMUSCULAR; INTRAVENOUS at 10:34

## 2017-10-20 NOTE — ANESTHESIA PREPROCEDURE EVALUATION
Anesthesia Evaluation     Patient summary reviewed and Nursing notes reviewed   history of anesthetic complications: PONV  NPO Solid Status: > 8 hours  NPO Liquid Status: > 2 hours     Airway   Dental      Pulmonary    (+) pulmonary embolism, a smoker Former, shortness of breath,   Cardiovascular     (+) pacemaker ICD, hypertension, past MI , dysrhythmias Tachycardia, CHF, DVT, hyperlipidemia    ROS comment: ECHO 2017 -- EF24%    Neuro/Psych  (+) headaches, psychiatric history Depression,    GI/Hepatic/Renal/Endo    (+) obesity,      Musculoskeletal (-) negative ROS    Abdominal    Substance History - negative use     OB/GYN negative ob/gyn ROS         Other - negative ROS                                   Anesthesia Plan    ASA 3     general     Anesthetic plan and risks discussed with patient.

## 2017-10-20 NOTE — ANESTHESIA PROCEDURE NOTES
Arterial Line    Patient location during procedure: holding area   Line placed for hemodynamic monitoring and ABGs/Labs/ISTAT.  Performed By   Anesthesiologist: LIANNE GONSALEZ  Preanesthetic Checklist  Completed: patient identified, site marked, surgical consent, pre-op evaluation, timeout performed, IV checked, risks and benefits discussed and monitors and equipment checked  Arterial Line Prep   Prep: ChloraPrep  Patient monitoring: blood pressure monitoring, continuous pulse oximetry and EKG  Arterial Line Procedure   Laterality:left  Location:  radial artery  Catheter size: 20 G   Guidance: palpation technique  Number of attempts: 1  Successful placement: yes          Post Assessment   Dressing Type: occlusive dressing applied, secured with tape and wrist guard applied.   Complications no  Circ/Move/Sens Assessment: normal.   Patient Tolerance: patient tolerated the procedure well with no apparent complications

## 2017-10-20 NOTE — ANESTHESIA POSTPROCEDURE EVALUATION
Patient: Tiffani Fiore    Procedure Summary     Date Anesthesia Start Anesthesia Stop Room / Location    10/20/17 1113 1547  [facility] 18 INV /  [facility] 18/19       Procedure Diagnosis Provider Provider    ICD LEAD EXTRACTION TRANSVENOUS AND PLACEMENT OF RV AND LV LEAD (N/A Chest) (lead extraction) MD Wally Sandoval MD          Anesthesia Type: general  Last vitals  BP   110/69 (10/20/17 1630)   Temp   36.3 °C (97.4 °F) (10/20/17 1545)   Pulse   73 (10/20/17 1630)   Resp   18 (10/20/17 1630)     SpO2   100 % (10/20/17 1630)     Post Anesthesia Care and Evaluation    Patient location during evaluation: PACU  Patient participation: complete - patient participated  Level of consciousness: awake and alert  Pain management: adequate  Airway patency: patent  Anesthetic complications: No anesthetic complications    Cardiovascular status: acceptable  Respiratory status: acceptable  Hydration status: acceptable    Comments: --------------------            10/20/17               1630     --------------------   BP:       110/69     Pulse:      73       Resp:       18       Temp:                SpO2:      100%     --------------------

## 2017-10-20 NOTE — PLAN OF CARE
Problem: Perioperative Period (Adult)  Goal: Signs and Symptoms of Listed Potential Problems Will be Absent or Manageable (Perioperative Period)  Outcome: Ongoing (interventions implemented as appropriate)    10/20/17 9273   Perioperative Period   Problems Assessed (Perioperative Period) all   Problems Present (Perioperative Period) pain

## 2017-10-20 NOTE — H&P (VIEW-ONLY)
Electrophysiology Follow-Up Note      Patient Name: Tiffani Fiore  Age/Sex: 47 y.o. female  : 1970  MRN: [id number]      Day of Service: 10/02/17       Chief Complaint/Follow-up: VT, Recent redo VT ablation      S: Constipated      Temp:  [97.8 °F (36.6 °C)-97.9 °F (36.6 °C)] 97.8 °F (36.6 °C)  Heart Rate:  [66-96] 75  Resp:  [16-18] 16  BP: ()/(53-85) 103/67     PHYSICAL EXAM:    General Appearance: No acute distress, well developed and well nourished.   Eyes: Conjunctiva and lids: No erythema, swelling, or discharge. Sclera non-icteric.   HENT: Atraumatic, normocephalic. External eyes, ears, and nose normal. Mucous membranes normal. Lips not cyanotic. Neck supple with no tenderness.  Respiratory: No signs of respiratory distress. Respiration rhythm and depth normal.   Clear to auscultation. No rales, crackles, rhonchi, or wheezing auscultated.   Cardiovascular:  Heart Rate and Rhythm: Normal, Heart Sounds: Normal S1 and S2. No S3 or S4 noted.  Murmurs: No murmurs noted. No rubs, thrills, or gallops.   Arterial Pulses: Posterior tibialis and dorsalis pedis pulses normal.   Lower Extremities: No edema noted.  Gastrointestinal:  Abdomen soft, non-distended, non-tender. Normal bowel sounds.  Musculoskeletal: Normal movement of extremities  Skin and Nails: General appearance normal. No pallor, cyanosis, diaphoresis. Skin temperature normal. No clubbing of fingernails.   Psychiatric: Patient alert and oriented to person, place, and time. Speech and behavior appropriate. Normal mood and affect.       ECG/TELE: ASVP        Results from last 7 days  Lab Units 10/02/17  0502 10/01/17  0540 17  1553   SODIUM mmol/L 140 143 142   POTASSIUM mmol/L 4.1 4.2 4.4   CHLORIDE mmol/L 101 105 104   CO2 mmol/L 25.6 24.7 25.2   BUN mg/dL 15 16 11   CREATININE mg/dL 0.90 0.76 0.72   GLUCOSE mg/dL 131* 100* 93   CALCIUM mg/dL 9.2 9.0 9.2       Results from last 7 days  Lab Units 17  0809   WBC  10*3/mm3 8.49   HEMOGLOBIN g/dL 13.8   HEMATOCRIT % 41.0   PLATELETS 10*3/mm3 181           Results from last 7 days  Lab Units 09/30/17  0809   TROPONIN T ng/mL 0.124*         Current Medications:   Scheduled Meds:  amiodarone 200 mg Oral Daily   carvedilol 3.125 mg Oral BID With Meals   enoxaparin 40 mg Subcutaneous Daily   mexiletine 200 mg Oral Q8H         Active Problems:    Ventricular tachycardia       Plan:     -Recurrent VT, s/p redo VT ablation 9/27 (failed sotalol and had recurrence on amio but we continued it after the ablation), came in with VT, mexiletine added-no more VT since admission  -NICM, s/p CRT-D  -Hx PAT, none noted on device check  -Constipated>meds/enema given    Will discharge home today see discharge note. Decrease amio to 100m daily    Maci Menchaca, WILD  10/02/17  12:52 PM

## 2017-10-20 NOTE — INTERVAL H&P NOTE
H&P updated. The patient was examined and the following changes are noted:  There is noise on the RV lead.  There has not been a satisfactory program solution.  The patient requires lead extraction and replacement.  In addition she has old LV lead in that will be extracted.

## 2017-10-20 NOTE — ANESTHESIA PROCEDURE NOTES
Airway  Urgency: elective    Airway not difficult    General Information and Staff    Patient location during procedure: OR  Anesthesiologist: EMANUEL DUNBAR    Indications and Patient Condition  Indications for airway management: airway protection    Preoxygenated: yes  MILS maintained throughout  Mask difficulty assessment: 1 - vent by mask    Final Airway Details  Final airway type: endotracheal airway      Successful airway: ETT  Cuffed: yes   Successful intubation technique: direct laryngoscopy  Endotracheal tube insertion site: oral  Blade: Tammi  Blade size: #3  ETT size: 7.0 mm  Cormack-Lehane Classification: grade I - full view of glottis  Placement verified by: chest auscultation and capnometry   Measured from: lips  ETT to lips (cm): 21  Number of attempts at approach: 1

## 2017-10-21 ENCOUNTER — APPOINTMENT (OUTPATIENT)
Dept: GENERAL RADIOLOGY | Facility: HOSPITAL | Age: 47
End: 2017-10-21

## 2017-10-21 VITALS
RESPIRATION RATE: 18 BRPM | HEART RATE: 82 BPM | BODY MASS INDEX: 32.15 KG/M2 | DIASTOLIC BLOOD PRESSURE: 57 MMHG | HEIGHT: 65 IN | OXYGEN SATURATION: 99 % | SYSTOLIC BLOOD PRESSURE: 97 MMHG | WEIGHT: 193 LBS | TEMPERATURE: 98.3 F

## 2017-10-21 PROCEDURE — G0378 HOSPITAL OBSERVATION PER HR: HCPCS

## 2017-10-21 PROCEDURE — 71020 HC CHEST PA AND LATERAL: CPT

## 2017-10-21 PROCEDURE — 99217 PR OBSERVATION CARE DISCHARGE MANAGEMENT: CPT | Performed by: NURSE PRACTITIONER

## 2017-10-21 RX ADMIN — AMIODARONE HYDROCHLORIDE 100 MG: 200 TABLET ORAL at 08:59

## 2017-10-21 RX ADMIN — ASPIRIN 81 MG: 81 TABLET, CHEWABLE ORAL at 08:59

## 2017-10-21 RX ADMIN — OXYCODONE HYDROCHLORIDE AND ACETAMINOPHEN 1 TABLET: 5; 325 TABLET ORAL at 08:58

## 2017-10-21 RX ADMIN — CARVEDILOL 6.25 MG: 6.25 TABLET, FILM COATED ORAL at 08:59

## 2017-10-21 RX ADMIN — LISINOPRIL 2.5 MG: 2.5 TABLET ORAL at 08:59

## 2017-10-21 RX ADMIN — MEXILETINE HYDROCHLORIDE 150 MG: 150 CAPSULE ORAL at 08:59

## 2017-10-21 NOTE — PLAN OF CARE
Problem: Patient Care Overview (Adult)  Goal: Plan of Care Review  Outcome: Ongoing (interventions implemented as appropriate)  Goal: Adult Individualization and Mutuality  Outcome: Ongoing (interventions implemented as appropriate)  Goal: Discharge Needs Assessment  Outcome: Ongoing (interventions implemented as appropriate)    Problem: Perioperative Period (Adult)  Goal: Signs and Symptoms of Listed Potential Problems Will be Absent or Manageable (Perioperative Period)  Outcome: Outcome(s) achieved Date Met:  10/20/17

## 2017-10-21 NOTE — DISCHARGE SUMMARY
Discharge Summary    Patient Identification:  Name: Tiffani Fiore  Age: 47 y.o.  Sex: female  :  1970  MRN: [id number]    Admit date: 10/20/2017    Discharge date and time:  10-21-17    Admitting Physician: Amrit Salgado MD     Discharge Physician: Jessica ROME      Discharge Diagnoses:   - NICM EF - EF 25%  - s/p laser lead extraction of right ventricular ICD lead, old LV pacing lead and insertion of new right ventricular ICD and LV ICD leads  - History of ventricular tachycardia and ablation    Discharged Condition: stable    Hospital Course:   This patient was admitted for elective replacement of right ventricular ICD lead.  Dr. Perez performed laser lead extraction of the right ventricular lead as well as the old LV lead followed by insertion of new RV and LV ICD leads utilizing left subclavian, left radial and right femoral venous access.  She tolerated the procedure without complications.    On postprocedure day #1, patient's awake alert and ambulating without difficulty.  She denies any dizziness, lightheadedness or shortness of breath , or palpitations.Device interrogation showed normal function.  Postprocedure chest x-ray results are pending.  Telemetry (see below) shows paced rhythm.      I reviewed incision care, venous access site care, and activity restrictions with the patient.  There is a small amount of oozing from the lateral aspect of the left subclavian incision and a sterile dressing has been applied.  The patient was advised to call her cardiologist office on Monday to schedule an incision check for next week and follow-up with her cardiologist within the next 2 weeks.  She was told to call for any fever, rigors, chills, slight pain, swelling or oozing.  All questions were answered.  She voiced understanding and agreement with treatment plan    Consults:   IP CONSULT TO ANESTHESIOLOGY    Discharge Exam:  General: Awake, alert resting quietly.  Appears in no acute  distress   Skin: Warm and dry, no diaphoresis noted   EYES: PERTL   HEENT: external ear and nose normal; oral mucosa moist   Neck:  no JVD   Heart: S1S2 regular rate and rhythm; no murmurs appreciated   Pulmonary: Respirations regular, unlabored at rest, bilateral breath sounds have good air entry throughout all lung fields; no crackles or wheezes auscultated.  Left subclavian procedure site with small amount of oozing at lateral and incision.  No erythema, ecchymosis, hematoma or fluctulance over pocket    GI: Soft, non-tender, non-distended, positive bowel sounds  Right femoral procedure site without oozing, ecchymosis, palpable hematoma or thrill    Extremities: Left radial procedure site without oozing, ecchymosis, palpable hematoma or thrill.  Left radial 2/left ulnar 2/left brachial 2  LUE and BLE pink and warm with 1-2 second capillary refill Bilateral lower extremities have no pre-tibial pitting edema; DP/PT pulses are palpable   Neurological: Alert and oriented x 3; CN II-XII grossly intact LIZ x4 no neuro deficits      Tele:      POD #1 CXR     LABS:    Results from last 7 days  Lab Units 10/20/17  1017   INR  1.18*   APTT seconds 28.8         Disposition:  home    Discharge Medications:    Tiffani Fiore   Home Medication Instructions ROBERTH:[id number]    Printed on:10/21/17 1212   Medication Information                      ALPRAZolam (XANAX) 0.25 MG tablet  Take 0.25 mg by mouth 2 (Two) Times a Day As Needed for Anxiety (dosage unklnown).             amiodarone (PACERONE) 200 MG tablet  Take 0.5 tablets by mouth Daily.             aspirin 81 MG tablet  Take 81 mg by mouth Daily. HOLD PRIOR TO SURGERY             carvedilol (COREG) 6.25 MG tablet  Take 6.25 mg by mouth Every Morning.             lisinopril (PRINIVIL,ZESTRIL) 2.5 MG tablet  Take 1 tablet by mouth Daily.             mexiletine (MEXITIL) 150 MG capsule  Take 1 capsule by mouth 3 (Three) Times a Day.                   Discharge Home  "Instructions:  1. Routine post AICD lead reimplant discharge home care instructions:      Do not get site wet for 72 hours.      Do not submerge device site below water for 7-10 days.      No lifting left/right upper extremity above head.      No lifting objects greater than 1 pound with affected extremity.      No driving until cleared by cardiologist at follow up appointment.       Wear sling until cleared by cardiologist.      Call office for any fevers, chills, redness,bleeding, drainage, increased pain, etc.       Follow up in the pacemaker clinic next week for incision check      Observe left wrist and right groin procedure sites for bleeding or knots.  If bleeding develops, lie flat, apply pressure and return to ER if bleeding persists    2. Call Dr Yancey's Office on Monday to schedule follow-up appointment in 2 weeks   3. Follow up with PCP in one week    I reviewed the patient's new clinical results, discharge treatments, medications and follow up. I personally viewed and interpreted the patient's EKG/Telemetry data  Signed:  WILD Vega  10/21/2017  12:12 PM      EMR Dragon/Transcription:   \"Dictated utilizing Dragon dictation\".     "

## 2017-10-24 LAB
ABO + RH BLD: NORMAL
ABO + RH BLD: NORMAL
BH BB BLOOD EXPIRATION DATE: NORMAL
BH BB BLOOD EXPIRATION DATE: NORMAL
BH BB BLOOD TYPE BARCODE: 6200
BH BB BLOOD TYPE BARCODE: 6200
BH BB DISPENSE STATUS: NORMAL
BH BB DISPENSE STATUS: NORMAL
BH BB PRODUCT CODE: NORMAL
BH BB PRODUCT CODE: NORMAL
BH BB UNIT NUMBER: NORMAL
BH BB UNIT NUMBER: NORMAL
UNIT  ABO: NORMAL
UNIT  ABO: NORMAL
UNIT  RH: NORMAL
UNIT  RH: NORMAL

## 2017-10-30 ENCOUNTER — CLINICAL SUPPORT NO REQUIREMENTS (OUTPATIENT)
Dept: CARDIOLOGY | Facility: CLINIC | Age: 47
End: 2017-10-30

## 2017-10-30 ENCOUNTER — TELEPHONE (OUTPATIENT)
Dept: CARDIOLOGY | Facility: CLINIC | Age: 47
End: 2017-10-30

## 2017-10-30 DIAGNOSIS — I42.8 NON-ISCHEMIC CARDIOMYOPATHY (HCC): Primary | ICD-10-CM

## 2017-10-30 PROCEDURE — 93284 PRGRMG EVAL IMPLANTABLE DFB: CPT | Performed by: INTERNAL MEDICINE

## 2017-10-30 NOTE — TELEPHONE ENCOUNTER
Below is my note from today's clinic apt.  The treated vt 1 episodes that occurred this morning is on Maci's desk.  Maci, Please let me know if you think this needs to go to another MD in Dr. Yancey's absence.  She has apt with him on 11/3/17 with a device check.  On 10/20/17 pt had 2 leads removed with laser extraction and 1 removed without laser.

## 2017-11-03 ENCOUNTER — OFFICE VISIT (OUTPATIENT)
Dept: CARDIOLOGY | Facility: CLINIC | Age: 47
End: 2017-11-03

## 2017-11-03 ENCOUNTER — CLINICAL SUPPORT NO REQUIREMENTS (OUTPATIENT)
Dept: CARDIOLOGY | Facility: CLINIC | Age: 47
End: 2017-11-03

## 2017-11-03 VITALS
HEIGHT: 65 IN | HEART RATE: 74 BPM | BODY MASS INDEX: 31.65 KG/M2 | SYSTOLIC BLOOD PRESSURE: 98 MMHG | DIASTOLIC BLOOD PRESSURE: 68 MMHG | WEIGHT: 190 LBS

## 2017-11-03 DIAGNOSIS — I42.8 CARDIOMYOPATHY, NONISCHEMIC (HCC): Primary | ICD-10-CM

## 2017-11-03 DIAGNOSIS — I42.8 CARDIOMYOPATHY, NONISCHEMIC (HCC): ICD-10-CM

## 2017-11-03 DIAGNOSIS — I47.20 VENTRICULAR TACHYCARDIA (HCC): Primary | ICD-10-CM

## 2017-11-03 DIAGNOSIS — Z95.810 S/P ICD (INTERNAL CARDIAC DEFIBRILLATOR) PROCEDURE: ICD-10-CM

## 2017-11-03 PROCEDURE — 99213 OFFICE O/P EST LOW 20 MIN: CPT | Performed by: INTERNAL MEDICINE

## 2017-11-03 PROCEDURE — 93000 ELECTROCARDIOGRAM COMPLETE: CPT | Performed by: INTERNAL MEDICINE

## 2017-11-03 RX ORDER — CARVEDILOL 6.25 MG/1
12.5 TABLET ORAL EVERY MORNING
Qty: 180 TABLET | Refills: 1 | Status: SHIPPED | OUTPATIENT
Start: 2017-11-03 | End: 2020-03-02 | Stop reason: SDUPTHER

## 2017-11-03 NOTE — PROGRESS NOTES
Date of Office Visit: 2017  Encounter Provider: Grayson Yancey MD  Place of Service: [facility] CARDIOLOGY  Patient Name: Tiffani Fiore  : 1970    Subjective:     Encounter Date:2017      Patient ID: Tiffani Fiore is a 47 y.o. female who has a cc of NICM and terrible VT-- failed mult ablations endocardial.  Also had RV lead fracture and recent extraction in which the LV lead was moved. New LV lead, anterior and basilar and w high capture, but it is working and I think the QRS looks decent.     Shehad 180minutes on 10-30. Surgery on 10-20    Term w ATP.      Nausea on Mexilitine 2oo but none on Waqas 150 TID.   And amiodarone 100 daily.     These episodes are much less than before -- was it the ablation or the waqas?     There have been no hospital admission since the last visit.     There have been no bleeding events.       Past Medical History:   Diagnosis Date   • Cardiomyopathy    • Chest pain    • CHF (congestive heart failure)    • Clostridium difficile colitis    • Depression    • DVT (deep venous thrombosis)    • Dyspnea    • Fatigue    • History of transfusion    • Hyperlipidemia    • Hypertension    • ICD (implantable cardioverter-defibrillator) in place     LEFT SUBCLAVIAN AREA   • Migraine    • Myocardial infarction    • Palpitations     ICD   • PONV (postoperative nausea and vomiting)    • Pulmonary embolism    • SVT (supraventricular tachycardia)    • Tachycardia        Social History     Social History   • Marital status: Single     Spouse name: N/A   • Number of children: N/A   • Years of education: N/A     Occupational History   • Not on file.     Social History Main Topics   • Smoking status: Former Smoker     Packs/day: 0.50     Years: 15.00     Types: Cigarettes     Quit date:    • Smokeless tobacco: Former User      Comment: quit age 42   • Alcohol use No   • Drug use: No   • Sexual activity: Defer     Other Topics Concern  "  • Not on file     Social History Narrative       Review of Systems   Constitution: Negative for fever and night sweats.   HENT: Negative for ear pain and stridor.    Eyes: Negative for discharge and visual halos.   Cardiovascular: Negative for cyanosis.   Respiratory: Negative for hemoptysis and sputum production.    Hematologic/Lymphatic: Negative for adenopathy.   Skin: Negative for nail changes and unusual hair distribution.   Musculoskeletal: Negative for gout and joint swelling.   Gastrointestinal: Negative for bowel incontinence and flatus.   Genitourinary: Negative for dysuria and flank pain.   Neurological: Negative for seizures and tremors.   Psychiatric/Behavioral: Negative for altered mental status. The patient is not nervous/anxious.             Objective:     Vitals:    11/03/17 0903   BP: 98/68   Pulse: 74   Weight: 190 lb (86.2 kg)   Height: 65\" (165.1 cm)         Physical Exam   Constitutional: She is oriented to person, place, and time.   HENT:   Head: Normocephalic and atraumatic.   Eyes: Right eye exhibits no discharge. Left eye exhibits no discharge.   Neck: No JVD present. No thyromegaly present.   Cardiovascular: Normal rate and regular rhythm.  Exam reveals no gallop and no friction rub.    No murmur heard.  Pulmonary/Chest: Effort normal and breath sounds normal. She has no rales.   Abdominal: Soft. Bowel sounds are normal. There is no tenderness.   Musculoskeletal: Normal range of motion. She exhibits no edema or deformity.   Neurological: She is alert and oriented to person, place, and time. She exhibits normal muscle tone.   Skin: Skin is warm and dry. No erythema.   Psychiatric: She has a normal mood and affect. Her behavior is normal. Thought content normal.         ECG 12 Lead  Date/Time: 11/3/2017 9:49 AM  Performed by: COLLEEN PRIETO  Authorized by: COLLEEN PRIETO   Comparison: compared with previous ECG   Similar to previous ECG  Rhythm: sinus rhythm and paced  Clinical impression: " abnormal ECG            Lab Review:       Assessment:          Diagnosis Plan   1. Ventricular tachycardia     2. Cardiomyopathy, nonischemic     3. S/P ICD (internal cardiac defibrillator) procedure            Plan:       She has recovered from ICD surgery    The LV lead is not ideal but we will stay put.     I programmed it as best as I could w LV offset at -30    If the VT becomes worse -- will have to refer to a VT center-- [facility] or [facility].     We will also try to increase carvedilol     And get off ace so her bp isn't too low

## 2017-11-14 ENCOUNTER — TELEPHONE (OUTPATIENT)
Dept: CARDIOLOGY | Facility: CLINIC | Age: 47
End: 2017-11-14

## 2017-11-27 RX ORDER — MEXILETINE HYDROCHLORIDE 150 MG/1
150 CAPSULE ORAL 4 TIMES DAILY
Qty: 120 CAPSULE | Refills: 5 | Status: SHIPPED | OUTPATIENT
Start: 2017-11-27 | End: 2018-03-08 | Stop reason: HOSPADM

## 2017-12-04 ENCOUNTER — CLINICAL SUPPORT NO REQUIREMENTS (OUTPATIENT)
Dept: CARDIOLOGY | Facility: CLINIC | Age: 47
End: 2017-12-04

## 2017-12-04 DIAGNOSIS — I42.8 CARDIOMYOPATHY, NONISCHEMIC (HCC): Primary | ICD-10-CM

## 2017-12-04 DIAGNOSIS — I47.20 VENTRICULAR TACHYCARDIA (HCC): ICD-10-CM

## 2018-01-24 ENCOUNTER — OFFICE VISIT (OUTPATIENT)
Dept: CARDIOLOGY | Facility: CLINIC | Age: 48
End: 2018-01-24

## 2018-01-24 ENCOUNTER — CLINICAL SUPPORT NO REQUIREMENTS (OUTPATIENT)
Dept: CARDIOLOGY | Facility: CLINIC | Age: 48
End: 2018-01-24

## 2018-01-24 VITALS
HEIGHT: 65 IN | BODY MASS INDEX: 31.82 KG/M2 | DIASTOLIC BLOOD PRESSURE: 80 MMHG | HEART RATE: 64 BPM | SYSTOLIC BLOOD PRESSURE: 118 MMHG | WEIGHT: 191 LBS

## 2018-01-24 DIAGNOSIS — Z95.810 S/P ICD (INTERNAL CARDIAC DEFIBRILLATOR) PROCEDURE: ICD-10-CM

## 2018-01-24 DIAGNOSIS — I47.20 VENTRICULAR TACHYCARDIA (HCC): Primary | ICD-10-CM

## 2018-01-24 DIAGNOSIS — I42.8 CARDIOMYOPATHY, NONISCHEMIC (HCC): ICD-10-CM

## 2018-01-24 PROCEDURE — 99213 OFFICE O/P EST LOW 20 MIN: CPT | Performed by: INTERNAL MEDICINE

## 2018-01-24 PROCEDURE — 93000 ELECTROCARDIOGRAM COMPLETE: CPT | Performed by: INTERNAL MEDICINE

## 2018-01-24 PROCEDURE — 93284 PRGRMG EVAL IMPLANTABLE DFB: CPT | Performed by: INTERNAL MEDICINE

## 2018-01-24 NOTE — PROGRESS NOTES
Date of Office Visit: 2018  Encounter Provider: Grayson Yancey MD  Place of Service: [facility] CARDIOLOGY  Patient Name: Tiffani Fiore  : 1970    Subjective:     Encounter Date:2018      Patient ID: Tiffani Fiore is a 47 y.o. female who has a cc of NICM and VT    Lots of VT despite amio and waqas.    Worse yet, she had extraction and replacement of LV lead and now has phrenic which she did not have before.     On q6 mexiletine     When she is not in VT, she still feels some tightness in chest; today she feels better.     ICD checks shows tons of VT>     There have been no hospital admission since the last visit.     There have been no bleeding events.       Past Medical History:   Diagnosis Date   • Cardiomyopathy    • Chest pain    • CHF (congestive heart failure)    • Clostridium difficile colitis    • Depression    • DVT (deep venous thrombosis)    • Dyspnea    • Fatigue    • History of transfusion    • Hyperlipidemia    • Hypertension    • ICD (implantable cardioverter-defibrillator) in place     LEFT SUBCLAVIAN AREA   • Migraine    • Myocardial infarction    • Palpitations     ICD   • PONV (postoperative nausea and vomiting)    • Pulmonary embolism    • SVT (supraventricular tachycardia)    • Tachycardia        Social History     Social History   • Marital status: Single     Spouse name: N/A   • Number of children: N/A   • Years of education: N/A     Occupational History   • Not on file.     Social History Main Topics   • Smoking status: Former Smoker     Packs/day: 0.50     Years: 15.00     Types: Cigarettes     Quit date:    • Smokeless tobacco: Former User      Comment: quit age 42   • Alcohol use No   • Drug use: No   • Sexual activity: Defer     Other Topics Concern   • Not on file     Social History Narrative       Review of Systems   Constitution: Negative for fever and night sweats.   HENT: Negative for ear pain and stridor.   "  Eyes: Negative for discharge and visual halos.   Cardiovascular: Negative for cyanosis.   Respiratory: Negative for hemoptysis and sputum production.    Hematologic/Lymphatic: Negative for adenopathy.   Skin: Negative for nail changes and unusual hair distribution.   Musculoskeletal: Negative for gout and joint swelling.   Gastrointestinal: Negative for bowel incontinence and flatus.   Genitourinary: Negative for dysuria and flank pain.   Neurological: Negative for seizures and tremors.   Psychiatric/Behavioral: Negative for altered mental status. The patient is not nervous/anxious.             Objective:     Vitals:    01/24/18 0847   BP: 118/80   Pulse: 64   Weight: 86.6 kg (191 lb)   Height: 165.1 cm (65\")         Physical Exam   Constitutional: She is oriented to person, place, and time.   HENT:   Head: Normocephalic and atraumatic.   Eyes: Right eye exhibits no discharge. Left eye exhibits no discharge.   Neck: No JVD present. No thyromegaly present.   Cardiovascular: Normal rate and regular rhythm.  Exam reveals no gallop and no friction rub.    No murmur heard.  Pulmonary/Chest: Effort normal and breath sounds normal. She has no rales.   Abdominal: Soft. Bowel sounds are normal. There is no tenderness.   Musculoskeletal: Normal range of motion. She exhibits no edema or deformity.   Neurological: She is alert and oriented to person, place, and time. She exhibits normal muscle tone.   Skin: Skin is warm and dry. No erythema.   Psychiatric: She has a normal mood and affect. Her behavior is normal. Thought content normal.         ECG 12 Lead  Date/Time: 1/24/2018 9:40 AM  Performed by: COLLEEN PRIETO  Authorized by: COLLEEN PRIETO   Comparison: compared with previous ECG   Similar to previous ECG  Rhythm: paced  Clinical impression: abnormal ECG            Lab Review:       Assessment:          Diagnosis Plan   1. Ventricular tachycardia     2. Cardiomyopathy, nonischemic     3. S/P ICD (internal cardiac " defibrillator) procedure            Plan:       I will refer her to a VT ablation center. Probably [facility].     Likely needs epicardial approach.     I tuned off the LV lead. It's anterior and not helping any ways.

## 2018-02-27 ENCOUNTER — APPOINTMENT (OUTPATIENT)
Dept: CT IMAGING | Facility: HOSPITAL | Age: 48
End: 2018-02-27

## 2018-02-27 ENCOUNTER — HOSPITAL ENCOUNTER (EMERGENCY)
Facility: HOSPITAL | Age: 48
Discharge: HOME | End: 2018-02-27
Attending: EMERGENCY MEDICINE | Admitting: EMERGENCY MEDICINE

## 2018-02-27 VITALS
RESPIRATION RATE: 16 BRPM | OXYGEN SATURATION: 97 % | TEMPERATURE: 98.9 F | HEIGHT: 65 IN | DIASTOLIC BLOOD PRESSURE: 54 MMHG | WEIGHT: 190 LBS | SYSTOLIC BLOOD PRESSURE: 100 MMHG | HEART RATE: 73 BPM | BODY MASS INDEX: 31.65 KG/M2

## 2018-02-27 DIAGNOSIS — R93.89 ABNORMAL CHEST CT: ICD-10-CM

## 2018-02-27 DIAGNOSIS — N20.1 RIGHT URETERAL STONE: Primary | ICD-10-CM

## 2018-02-27 LAB
ALBUMIN SERPL-MCNC: 4.2 G/DL (ref 3.5–5.2)
ALBUMIN/GLOB SERPL: 1.6 G/DL
ALP SERPL-CCNC: 82 U/L (ref 39–117)
ALT SERPL W P-5'-P-CCNC: 19 U/L (ref 1–33)
ANION GAP SERPL CALCULATED.3IONS-SCNC: 11.2 MMOL/L
AST SERPL-CCNC: 17 U/L (ref 1–32)
BACTERIA UR QL AUTO: ABNORMAL /HPF
BASOPHILS # BLD AUTO: 0.02 10*3/MM3 (ref 0–0.2)
BASOPHILS NFR BLD AUTO: 0.2 % (ref 0–1.5)
BILIRUB SERPL-MCNC: 0.3 MG/DL (ref 0.1–1.2)
BILIRUB UR QL STRIP: NEGATIVE
BUN BLD-MCNC: 17 MG/DL (ref 6–20)
BUN/CREAT SERPL: 20 (ref 7–25)
CALCIUM SPEC-SCNC: 8.9 MG/DL (ref 8.6–10.5)
CHLORIDE SERPL-SCNC: 102 MMOL/L (ref 98–107)
CLARITY UR: CLEAR
CO2 SERPL-SCNC: 25.8 MMOL/L (ref 22–29)
COLOR UR: YELLOW
CREAT BLD-MCNC: 0.85 MG/DL (ref 0.57–1)
DEPRECATED RDW RBC AUTO: 43.3 FL (ref 37–54)
EOSINOPHIL # BLD AUTO: 0.21 10*3/MM3 (ref 0–0.7)
EOSINOPHIL NFR BLD AUTO: 2.1 % (ref 0.3–6.2)
ERYTHROCYTE [DISTWIDTH] IN BLOOD BY AUTOMATED COUNT: 13.2 % (ref 11.7–13)
GFR SERPL CREATININE-BSD FRML MDRD: 72 ML/MIN/1.73
GLOBULIN UR ELPH-MCNC: 2.6 GM/DL
GLUCOSE BLD-MCNC: 106 MG/DL (ref 65–99)
GLUCOSE UR STRIP-MCNC: NEGATIVE MG/DL
HCT VFR BLD AUTO: 35.6 % (ref 35.6–45.5)
HGB BLD-MCNC: 12.2 G/DL (ref 11.9–15.5)
HGB UR QL STRIP.AUTO: ABNORMAL
HOLD SPECIMEN: NORMAL
HOLD SPECIMEN: NORMAL
HYALINE CASTS UR QL AUTO: ABNORMAL /LPF
IMM GRANULOCYTES # BLD: 0.07 10*3/MM3 (ref 0–0.03)
IMM GRANULOCYTES NFR BLD: 0.7 % (ref 0–0.5)
KETONES UR QL STRIP: NEGATIVE
LEUKOCYTE ESTERASE UR QL STRIP.AUTO: ABNORMAL
LIPASE SERPL-CCNC: 45 U/L (ref 13–60)
LYMPHOCYTES # BLD AUTO: 2.37 10*3/MM3 (ref 0.9–4.8)
LYMPHOCYTES NFR BLD AUTO: 23.4 % (ref 19.6–45.3)
MCH RBC QN AUTO: 30.8 PG (ref 26.9–32)
MCHC RBC AUTO-ENTMCNC: 34.3 G/DL (ref 32.4–36.3)
MCV RBC AUTO: 89.9 FL (ref 80.5–98.2)
MONOCYTES # BLD AUTO: 0.66 10*3/MM3 (ref 0.2–1.2)
MONOCYTES NFR BLD AUTO: 6.5 % (ref 5–12)
NEUTROPHILS # BLD AUTO: 6.79 10*3/MM3 (ref 1.9–8.1)
NEUTROPHILS NFR BLD AUTO: 67.1 % (ref 42.7–76)
NITRITE UR QL STRIP: NEGATIVE
PH UR STRIP.AUTO: <=5 [PH] (ref 5–8)
PLATELET # BLD AUTO: 180 10*3/MM3 (ref 140–500)
PMV BLD AUTO: 10.7 FL (ref 6–12)
POTASSIUM BLD-SCNC: 4.6 MMOL/L (ref 3.5–5.2)
PROT SERPL-MCNC: 6.8 G/DL (ref 6–8.5)
PROT UR QL STRIP: NEGATIVE
RBC # BLD AUTO: 3.96 10*6/MM3 (ref 3.9–5.2)
RBC # UR: ABNORMAL /HPF
REF LAB TEST METHOD: ABNORMAL
SODIUM BLD-SCNC: 139 MMOL/L (ref 136–145)
SP GR UR STRIP: 1.02 (ref 1–1.03)
SQUAMOUS #/AREA URNS HPF: ABNORMAL /HPF
UROBILINOGEN UR QL STRIP: ABNORMAL
WBC NRBC COR # BLD: 10.12 10*3/MM3 (ref 4.5–10.7)
WBC UR QL AUTO: ABNORMAL /HPF
WHOLE BLOOD HOLD SPECIMEN: NORMAL
WHOLE BLOOD HOLD SPECIMEN: NORMAL

## 2018-02-27 PROCEDURE — 25010000002 ONDANSETRON PER 1 MG: Performed by: EMERGENCY MEDICINE

## 2018-02-27 PROCEDURE — 81001 URINALYSIS AUTO W/SCOPE: CPT | Performed by: EMERGENCY MEDICINE

## 2018-02-27 PROCEDURE — 96374 THER/PROPH/DIAG INJ IV PUSH: CPT

## 2018-02-27 PROCEDURE — 99284 EMERGENCY DEPT VISIT MOD MDM: CPT

## 2018-02-27 PROCEDURE — 85025 COMPLETE CBC W/AUTO DIFF WBC: CPT | Performed by: EMERGENCY MEDICINE

## 2018-02-27 PROCEDURE — 71250 CT THORAX DX C-: CPT

## 2018-02-27 PROCEDURE — 80053 COMPREHEN METABOLIC PANEL: CPT | Performed by: EMERGENCY MEDICINE

## 2018-02-27 PROCEDURE — 74176 CT ABD & PELVIS W/O CONTRAST: CPT

## 2018-02-27 PROCEDURE — 25010000002 KETOROLAC TROMETHAMINE PER 15 MG: Performed by: PHYSICIAN ASSISTANT

## 2018-02-27 PROCEDURE — 36415 COLL VENOUS BLD VENIPUNCTURE: CPT | Performed by: EMERGENCY MEDICINE

## 2018-02-27 PROCEDURE — 25010000002 HYDROMORPHONE PER 4 MG: Performed by: EMERGENCY MEDICINE

## 2018-02-27 PROCEDURE — 96375 TX/PRO/DX INJ NEW DRUG ADDON: CPT

## 2018-02-27 PROCEDURE — 83690 ASSAY OF LIPASE: CPT | Performed by: EMERGENCY MEDICINE

## 2018-02-27 RX ORDER — SODIUM CHLORIDE 0.9 % (FLUSH) 0.9 %
10 SYRINGE (ML) INJECTION AS NEEDED
Status: DISCONTINUED | OUTPATIENT
Start: 2018-02-27 | End: 2018-02-27 | Stop reason: HOSPADM

## 2018-02-27 RX ORDER — ONDANSETRON 4 MG/1
4 TABLET, FILM COATED ORAL EVERY 8 HOURS PRN
Qty: 15 TABLET | Refills: 0 | Status: ON HOLD | OUTPATIENT
Start: 2018-02-27 | End: 2020-02-24

## 2018-02-27 RX ORDER — LISINOPRIL 20 MG/1
20 TABLET ORAL DAILY
COMMUNITY
End: 2020-03-19 | Stop reason: SDUPTHER

## 2018-02-27 RX ORDER — CEPHALEXIN 500 MG/1
500 CAPSULE ORAL 3 TIMES DAILY
Qty: 21 CAPSULE | Refills: 0 | Status: SHIPPED | OUTPATIENT
Start: 2018-02-27 | End: 2018-03-08 | Stop reason: HOSPADM

## 2018-02-27 RX ORDER — HYDROMORPHONE HCL 110MG/55ML
1 PATIENT CONTROLLED ANALGESIA SYRINGE INTRAVENOUS ONCE
Status: COMPLETED | OUTPATIENT
Start: 2018-02-27 | End: 2018-02-27

## 2018-02-27 RX ORDER — TAMSULOSIN HYDROCHLORIDE 0.4 MG/1
1 CAPSULE ORAL NIGHTLY
Qty: 14 CAPSULE | Refills: 0 | Status: SHIPPED | OUTPATIENT
Start: 2018-02-27 | End: 2018-03-08 | Stop reason: HOSPADM

## 2018-02-27 RX ORDER — OXYCODONE HYDROCHLORIDE AND ACETAMINOPHEN 5; 325 MG/1; MG/1
1 TABLET ORAL EVERY 6 HOURS PRN
Qty: 20 TABLET | Refills: 0 | Status: SHIPPED | OUTPATIENT
Start: 2018-02-27 | End: 2018-03-08 | Stop reason: HOSPADM

## 2018-02-27 RX ORDER — KETOROLAC TROMETHAMINE 30 MG/ML
30 INJECTION, SOLUTION INTRAMUSCULAR; INTRAVENOUS ONCE
Status: COMPLETED | OUTPATIENT
Start: 2018-02-27 | End: 2018-02-27

## 2018-02-27 RX ORDER — ONDANSETRON 2 MG/ML
4 INJECTION INTRAMUSCULAR; INTRAVENOUS ONCE
Status: COMPLETED | OUTPATIENT
Start: 2018-02-27 | End: 2018-02-27

## 2018-02-27 RX ADMIN — SODIUM CHLORIDE 1000 ML: 9 INJECTION, SOLUTION INTRAVENOUS at 10:48

## 2018-02-27 RX ADMIN — KETOROLAC TROMETHAMINE 30 MG: 30 INJECTION, SOLUTION INTRAMUSCULAR at 12:37

## 2018-02-27 RX ADMIN — ONDANSETRON 4 MG: 2 INJECTION INTRAMUSCULAR; INTRAVENOUS at 10:51

## 2018-02-27 RX ADMIN — HYDROMORPHONE HYDROCHLORIDE 1 MG: 2 INJECTION, SOLUTION INTRAMUSCULAR; INTRAVENOUS; SUBCUTANEOUS at 10:54

## 2018-03-05 ENCOUNTER — TELEPHONE (OUTPATIENT)
Dept: SOCIAL WORK | Facility: HOSPITAL | Age: 48
End: 2018-03-05

## 2018-03-06 ENCOUNTER — TELEPHONE (OUTPATIENT)
Dept: SOCIAL WORK | Facility: HOSPITAL | Age: 48
End: 2018-03-06

## 2018-03-08 ENCOUNTER — ANESTHESIA EVENT (OUTPATIENT)
Dept: GASTROENTEROLOGY | Facility: HOSPITAL | Age: 48
End: 2018-03-08

## 2018-03-08 ENCOUNTER — ANESTHESIA (OUTPATIENT)
Dept: GASTROENTEROLOGY | Facility: HOSPITAL | Age: 48
End: 2018-03-08

## 2018-03-08 ENCOUNTER — HOSPITAL ENCOUNTER (OUTPATIENT)
Facility: HOSPITAL | Age: 48
Setting detail: HOSPITAL OUTPATIENT SURGERY
Discharge: HOME | End: 2018-03-08
Attending: INTERNAL MEDICINE | Admitting: INTERNAL MEDICINE

## 2018-03-08 VITALS
HEIGHT: 65 IN | DIASTOLIC BLOOD PRESSURE: 68 MMHG | WEIGHT: 188 LBS | BODY MASS INDEX: 31.32 KG/M2 | RESPIRATION RATE: 16 BRPM | HEART RATE: 74 BPM | TEMPERATURE: 97.9 F | OXYGEN SATURATION: 98 % | SYSTOLIC BLOOD PRESSURE: 102 MMHG

## 2018-03-08 DIAGNOSIS — R91.8 ABNORMAL CT SCAN OF LUNG: ICD-10-CM

## 2018-03-08 LAB
APPEARANCE FLD: ABNORMAL
B PERT DNA SPEC QL NAA+PROBE: NOT DETECTED
C PNEUM DNA NPH QL NAA+NON-PROBE: NOT DETECTED
COLOR FLD: COLORLESS
FLUAV H1 2009 PAND RNA NPH QL NAA+PROBE: NOT DETECTED
FLUAV H1 HA GENE NPH QL NAA+PROBE: NOT DETECTED
FLUAV H3 RNA NPH QL NAA+PROBE: NOT DETECTED
FLUAV SUBTYP SPEC NAA+PROBE: NOT DETECTED
FLUBV RNA ISLT QL NAA+PROBE: NOT DETECTED
GIE STN SPEC: NORMAL
HADV DNA SPEC NAA+PROBE: NOT DETECTED
HCOV 229E RNA SPEC QL NAA+PROBE: NOT DETECTED
HCOV HKU1 RNA SPEC QL NAA+PROBE: NOT DETECTED
HCOV NL63 RNA SPEC QL NAA+PROBE: NOT DETECTED
HCOV OC43 RNA SPEC QL NAA+PROBE: NOT DETECTED
HMPV RNA NPH QL NAA+NON-PROBE: NOT DETECTED
HPIV1 RNA SPEC QL NAA+PROBE: NOT DETECTED
HPIV2 RNA SPEC QL NAA+PROBE: NOT DETECTED
HPIV3 RNA NPH QL NAA+PROBE: NOT DETECTED
HPIV4 P GENE NPH QL NAA+PROBE: NOT DETECTED
LYMPHOCYTES NFR FLD MANUAL: 15 %
M PNEUMO IGG SER IA-ACNC: NOT DETECTED
MONOS+MACROS NFR FLD: 78 %
NEUTROPHILS NFR FLD MANUAL: 7 %
OTHER CELLS FLUID PER 100/WBCS: 126 /100 WBCS
RBC # FLD AUTO: 16 /MM3
RHINOVIRUS RNA SPEC NAA+PROBE: NOT DETECTED
RSV RNA NPH QL NAA+NON-PROBE: NOT DETECTED
WBC # FLD: 48 /MM3

## 2018-03-08 PROCEDURE — 87486 CHLMYD PNEUM DNA AMP PROBE: CPT | Performed by: INTERNAL MEDICINE

## 2018-03-08 PROCEDURE — 89051 BODY FLUID CELL COUNT: CPT | Performed by: INTERNAL MEDICINE

## 2018-03-08 PROCEDURE — 87206 SMEAR FLUORESCENT/ACID STAI: CPT | Performed by: INTERNAL MEDICINE

## 2018-03-08 PROCEDURE — 87798 DETECT AGENT NOS DNA AMP: CPT | Performed by: INTERNAL MEDICINE

## 2018-03-08 PROCEDURE — 87102 FUNGUS ISOLATION CULTURE: CPT | Performed by: INTERNAL MEDICINE

## 2018-03-08 PROCEDURE — 87581 M.PNEUMON DNA AMP PROBE: CPT | Performed by: INTERNAL MEDICINE

## 2018-03-08 PROCEDURE — 25010000002 PROPOFOL 10 MG/ML EMULSION: Performed by: NURSE ANESTHETIST, CERTIFIED REGISTERED

## 2018-03-08 PROCEDURE — 25010000002 PROPOFOL 1000 MG/ML EMULSION: Performed by: NURSE ANESTHETIST, CERTIFIED REGISTERED

## 2018-03-08 PROCEDURE — 88305 TISSUE EXAM BY PATHOLOGIST: CPT | Performed by: INTERNAL MEDICINE

## 2018-03-08 PROCEDURE — 87633 RESP VIRUS 12-25 TARGETS: CPT | Performed by: INTERNAL MEDICINE

## 2018-03-08 PROCEDURE — 87205 SMEAR GRAM STAIN: CPT | Performed by: INTERNAL MEDICINE

## 2018-03-08 PROCEDURE — 88112 CYTOPATH CELL ENHANCE TECH: CPT | Performed by: INTERNAL MEDICINE

## 2018-03-08 PROCEDURE — 87071 CULTURE AEROBIC QUANT OTHER: CPT | Performed by: INTERNAL MEDICINE

## 2018-03-08 PROCEDURE — 87116 MYCOBACTERIA CULTURE: CPT | Performed by: INTERNAL MEDICINE

## 2018-03-08 RX ORDER — LIDOCAINE HYDROCHLORIDE 20 MG/ML
INJECTION, SOLUTION INFILTRATION; PERINEURAL AS NEEDED
Status: DISCONTINUED | OUTPATIENT
Start: 2018-03-08 | End: 2018-03-08 | Stop reason: SURG

## 2018-03-08 RX ORDER — PROPOFOL 10 MG/ML
VIAL (ML) INTRAVENOUS AS NEEDED
Status: DISCONTINUED | OUTPATIENT
Start: 2018-03-08 | End: 2018-03-08 | Stop reason: SURG

## 2018-03-08 RX ORDER — SODIUM CHLORIDE, SODIUM LACTATE, POTASSIUM CHLORIDE, CALCIUM CHLORIDE 600; 310; 30; 20 MG/100ML; MG/100ML; MG/100ML; MG/100ML
30 INJECTION, SOLUTION INTRAVENOUS CONTINUOUS
Status: DISCONTINUED | OUTPATIENT
Start: 2018-03-08 | End: 2018-03-08 | Stop reason: HOSPADM

## 2018-03-08 RX ORDER — SODIUM CHLORIDE 0.9 % (FLUSH) 0.9 %
1-10 SYRINGE (ML) INJECTION AS NEEDED
Status: DISCONTINUED | OUTPATIENT
Start: 2018-03-08 | End: 2018-03-08 | Stop reason: HOSPADM

## 2018-03-08 RX ORDER — LIDOCAINE HYDROCHLORIDE 10 MG/ML
INJECTION, SOLUTION EPIDURAL; INFILTRATION; INTRACAUDAL; PERINEURAL AS NEEDED
Status: DISCONTINUED | OUTPATIENT
Start: 2018-03-08 | End: 2018-03-08 | Stop reason: HOSPADM

## 2018-03-08 RX ADMIN — PROPOFOL 180 MCG/KG/MIN: 10 INJECTION, EMULSION INTRAVENOUS at 09:39

## 2018-03-08 RX ADMIN — LIDOCAINE HYDROCHLORIDE 60 MG: 20 INJECTION, SOLUTION INFILTRATION; PERINEURAL at 09:37

## 2018-03-08 RX ADMIN — SODIUM CHLORIDE, POTASSIUM CHLORIDE, SODIUM LACTATE AND CALCIUM CHLORIDE 30 ML/HR: 600; 310; 30; 20 INJECTION, SOLUTION INTRAVENOUS at 09:03

## 2018-03-08 RX ADMIN — PROPOFOL 200 MG: 10 INJECTION, EMULSION INTRAVENOUS at 09:37

## 2018-03-08 NOTE — ANESTHESIA PREPROCEDURE EVALUATION
Anesthesia Evaluation     Patient summary reviewed                Airway   Mallampati: II  TM distance: >3 FB  Neck ROM: full  No difficulty expected  Dental - normal exam     Pulmonary     breath sounds clear to auscultation  Cardiovascular   Exercise tolerance: good (4-7 METS)    Rhythm: regular  Rate: normal        Neuro/Psych  GI/Hepatic/Renal/Endo      Musculoskeletal     Abdominal    Substance History      OB/GYN          Other                        Anesthesia Plan    ASA 3     general     intravenous induction   Anesthetic plan and risks discussed with patient.

## 2018-03-08 NOTE — ANESTHESIA POSTPROCEDURE EVALUATION
Patient: Tiffani Fiore    Procedure Summary     Date Anesthesia Start Anesthesia Stop Room / Location    03/08/18 0930 0956  [facility] 7 /  [facility]       Procedure Diagnosis Surgeon Provider    BRONCHOSCOPY WITH BAL (N/A Bronchus) No diagnosis on file. MD Grayson Whitehead MD          Anesthesia Type: general  Last vitals  BP   102/68 (03/08/18 1024)   Temp   36.6 °C (97.9 °F) (03/08/18 1014)   Pulse   74 (03/08/18 1024)   Resp   16 (03/08/18 1024)     SpO2   98 % (03/08/18 1024)     Post Anesthesia Care and Evaluation    Patient location during evaluation: PACU  Patient participation: complete - patient participated  Level of consciousness: awake and alert  Pain score: 0  Pain management: adequate  Airway patency: patent  Anesthetic complications: No anesthetic complications    Cardiovascular status: acceptable  Respiratory status: acceptable  Hydration status: acceptable

## 2018-03-08 NOTE — PLAN OF CARE
Problem: Patient Care Overview (Adult)  Goal: Plan of Care Review  Outcome: Ongoing (interventions implemented as appropriate)   03/08/18 0844   Coping/Psychosocial Response Interventions   Plan Of Care Reviewed With patient     Goal: Adult Individualization and Mutuality  Outcome: Ongoing (interventions implemented as appropriate)    Goal: Discharge Needs Assessment  Outcome: Ongoing (interventions implemented as appropriate)   03/08/18 0844   Discharge Needs Assessment   Concerns To Be Addressed no discharge needs identified   Discharge Disposition home or self-care   Living Environment   Transportation Available car;family or friend will provide       Problem: Bronchoscopy (Adult)  Goal: Signs and Symptoms of Listed Potential Problems Will be Absent or Manageable (Bronchoscopy)  Outcome: Ongoing (interventions implemented as appropriate)   03/08/18 0844   Bronchoscopy   Problems Assessed (Bronchoscopy) all   Problems Present (Bronchoscopy) none

## 2018-03-08 NOTE — OP NOTE
Bronchoscopy Procedure Note    Procedure:  1. Bronchoscopy, therapeutic     Pre-Operative Diagnosis:  Left lower lobe collapse     Post-Operative Diagnosis: Same plus mucous plugging left lower lobe    Indication:  Abnormal CT with left lower lung collapse    Anesthesia: Monitored Anesthesia Care (MAC)    Procedure Details: Patient was consented for the procedure with all risk and benefit of the procedure explained in detail.  Patient was given the opportunity to ask questions and all concerns were answered.  The bronchocope was inserted into the main airway via the LMA. An anatomical survey was done of the main airways and the subsegmental bronchus to at least the first subsegmental level of all five lobes of both lungs.  The findings are reported below.  A bronchialalveolar lavage was performed using aliquots of normal saline instilled into the airways then aspirated back.    Findings:  Bronchoscope passed through LMA to the level of the vocal cords.  Lidocaine used for local anesthetic over vocal cords.  Bronchoscope was passed between the vocal cords into the trachea.  All airways were visualized to at least the first subsegment level of all 5 lobes of both lungs.  Airways were of normal size and caliber.  No endobronchial lesions seen.  Mucous plugging noted in the left lower lobe.  Bronchial alveolar lavage performed in the left lower lobe with 180cc saline instilled and 65cc hazy return with plugs in sample.    Estimated Blood Loss:  Minimal           Specimens:  BAL from LLL                Complications:  None; patient tolerated the procedure well.           Disposition: PACU - hemodynamically stable.      Patient tolerated the procedure well.    Berto Nicholson MD  3/8/2018  9:45 AM

## 2018-03-08 NOTE — NURSING NOTE
Pt at pacemaker / AICD.  Aldo from [employer] states ok to use magnet during procedure if needed.  Device needs to be interrogated if magnet used.  Seth RN

## 2018-03-09 LAB
CYTO UR: NORMAL
LAB AP CASE REPORT: NORMAL
Lab: NORMAL
PATH REPORT.FINAL DX SPEC: NORMAL
PATH REPORT.GROSS SPEC: NORMAL

## 2018-03-10 LAB
BACTERIA SPEC AEROBE CULT: NORMAL
BACTERIA SPEC AEROBE CULT: NORMAL
GRAM STN SPEC: NORMAL

## 2018-04-05 LAB — FUNGUS WND CULT: NORMAL

## 2018-04-19 LAB
MYCOBACTERIUM SPEC CULT: NORMAL
NIGHT BLUE STAIN TISS: NORMAL

## 2018-04-30 ENCOUNTER — CLINICAL SUPPORT NO REQUIREMENTS (OUTPATIENT)
Dept: CARDIOLOGY | Facility: CLINIC | Age: 48
End: 2018-04-30

## 2018-04-30 DIAGNOSIS — I47.20 VENTRICULAR TACHYCARDIA (HCC): Primary | ICD-10-CM

## 2018-04-30 PROCEDURE — 93295 DEV INTERROG REMOTE 1/2/MLT: CPT | Performed by: INTERNAL MEDICINE

## 2018-04-30 PROCEDURE — 93296 REM INTERROG EVL PM/IDS: CPT | Performed by: INTERNAL MEDICINE

## 2018-05-01 ENCOUNTER — TELEPHONE (OUTPATIENT)
Dept: CARDIOLOGY | Facility: CLINIC | Age: 48
End: 2018-05-01

## 2018-05-01 NOTE — TELEPHONE ENCOUNTER
Remote VVI CRT-D check received. Patient has had 135 NST and 103VT episodes since last check 1/24. Longest listed 3 minutes. The onset and termination of the 6 available episodes are not shown on the emgs. Rates 140's -150's. Patient is scheduled to see you 6/22.

## 2018-05-04 ENCOUNTER — CLINICAL SUPPORT NO REQUIREMENTS (OUTPATIENT)
Dept: CARDIOLOGY | Facility: CLINIC | Age: 48
End: 2018-05-04

## 2018-05-04 DIAGNOSIS — I42.8 CARDIOMYOPATHY, NONISCHEMIC (HCC): Primary | ICD-10-CM

## 2018-05-04 DIAGNOSIS — I47.20 VENTRICULAR TACHYCARDIA (HCC): ICD-10-CM

## 2018-05-04 PROCEDURE — 93284 PRGRMG EVAL IMPLANTABLE DFB: CPT | Performed by: INTERNAL MEDICINE

## 2018-06-22 ENCOUNTER — OFFICE VISIT (OUTPATIENT)
Dept: CARDIOLOGY | Facility: CLINIC | Age: 48
End: 2018-06-22

## 2018-06-22 ENCOUNTER — CLINICAL SUPPORT NO REQUIREMENTS (OUTPATIENT)
Dept: CARDIOLOGY | Facility: CLINIC | Age: 48
End: 2018-06-22

## 2018-06-22 VITALS
HEART RATE: 72 BPM | HEIGHT: 65 IN | WEIGHT: 181 LBS | DIASTOLIC BLOOD PRESSURE: 78 MMHG | SYSTOLIC BLOOD PRESSURE: 118 MMHG | BODY MASS INDEX: 30.16 KG/M2

## 2018-06-22 DIAGNOSIS — I47.20 VENTRICULAR TACHYCARDIA (HCC): ICD-10-CM

## 2018-06-22 DIAGNOSIS — Z95.810 S/P ICD (INTERNAL CARDIAC DEFIBRILLATOR) PROCEDURE: ICD-10-CM

## 2018-06-22 DIAGNOSIS — I47.1 ATRIAL TACHYCARDIA, PAROXYSMAL (HCC): ICD-10-CM

## 2018-06-22 DIAGNOSIS — I42.8 NON-ISCHEMIC CARDIOMYOPATHY (HCC): Primary | ICD-10-CM

## 2018-06-22 DIAGNOSIS — I42.8 CARDIOMYOPATHY, NONISCHEMIC (HCC): Primary | ICD-10-CM

## 2018-06-22 PROCEDURE — 99213 OFFICE O/P EST LOW 20 MIN: CPT | Performed by: INTERNAL MEDICINE

## 2018-06-22 PROCEDURE — 93283 PRGRMG EVAL IMPLANTABLE DFB: CPT | Performed by: INTERNAL MEDICINE

## 2018-06-22 PROCEDURE — 93000 ELECTROCARDIOGRAM COMPLETE: CPT | Performed by: INTERNAL MEDICINE

## 2018-06-22 NOTE — PROGRESS NOTES
Date of Office Visit: 2018  Encounter Provider: Grayson Yancey MD  Place of Service: [facility] CARDIOLOGY  Patient Name: Tiffani Fiore  : 1970    Subjective:     Encounter Date:2018      Patient ID: Tiffani Fiore is a 47 y.o. female who has a cc of NICM and VT and had multiple ablations and finally had an epi ablation at  w Jakob Tung  No vt since. None. Nada.     The patient had a good year.   No anginal chest pain,   No sig rodriguez,   No soa,   No fainting,  No orthostasis.   No edema.   Exercise tolerance: improving     There have been no hospital admission since the last visit.     There have been no bleeding events.       Past Medical History:   Diagnosis Date   • Cardiomyopathy    • Chest pain    • CHF (congestive heart failure)    • Clostridium difficile colitis    • Depression    • DVT (deep venous thrombosis)    • Dyspnea    • Fatigue    • History of transfusion    • Hyperlipidemia    • Hypertension    • ICD (implantable cardioverter-defibrillator) in place     LEFT SUBCLAVIAN AREA   • Migraine    • Myocardial infarction    • Palpitations     ICD   • PONV (postoperative nausea and vomiting)    • Pulmonary embolism    • SVT (supraventricular tachycardia)    • Tachycardia        Social History     Social History   • Marital status: Single     Spouse name: N/A   • Number of children: N/A   • Years of education: N/A     Occupational History   • Not on file.     Social History Main Topics   • Smoking status: Former Smoker     Packs/day: 0.50     Years: 15.00     Types: Cigarettes     Quit date:    • Smokeless tobacco: Former User      Comment: quit age 42   • Alcohol use No   • Drug use: No   • Sexual activity: Defer     Other Topics Concern   • Not on file     Social History Narrative   • No narrative on file       Review of Systems   Constitution: Negative for fever and night sweats.   HENT: Negative for ear pain and stridor.    Eyes:  "Negative for discharge and visual halos.   Cardiovascular: Negative for cyanosis.   Respiratory: Negative for hemoptysis and sputum production.    Hematologic/Lymphatic: Negative for adenopathy.   Skin: Negative for nail changes and unusual hair distribution.   Musculoskeletal: Negative for gout and joint swelling.   Gastrointestinal: Negative for bowel incontinence and flatus.   Genitourinary: Negative for dysuria and flank pain.   Neurological: Negative for seizures and tremors.   Psychiatric/Behavioral: Negative for altered mental status. The patient is not nervous/anxious.             Objective:     Vitals:    06/22/18 0848   BP: 118/78   Pulse: 72   Weight: 82.1 kg (181 lb)   Height: 165.1 cm (65\")         Physical Exam   Constitutional: She is oriented to person, place, and time.   HENT:   Head: Normocephalic and atraumatic.   Eyes: Right eye exhibits no discharge. Left eye exhibits no discharge.   Neck: No JVD present. No thyromegaly present.   Cardiovascular: Normal rate and regular rhythm.  Exam reveals no gallop and no friction rub.    No murmur heard.  Pulmonary/Chest: Effort normal and breath sounds normal. She has no rales.   Abdominal: Soft. Bowel sounds are normal. There is no tenderness.   Musculoskeletal: Normal range of motion. She exhibits no edema or deformity.   Neurological: She is alert and oriented to person, place, and time. She exhibits normal muscle tone.   Skin: Skin is warm and dry. No erythema.   Psychiatric: She has a normal mood and affect. Her behavior is normal. Thought content normal.         ECG 12 Lead  Date/Time: 6/22/2018 9:37 AM  Performed by: COLLEEN PRIETO  Authorized by: COLLEEN PRIETO   Comparison: compared with previous ECG   Similar to previous ECG  Rhythm: sinus rhythm  Conduction: non-specific intraventricular conduction delay  QRS axis: left  Clinical impression: abnormal ECG            Lab Review:       Assessment:          Diagnosis Plan   1. Cardiomyopathy, " nonischemic     2. Ventricular tachycardia     3. S/P ICD (internal cardiac defibrillator) procedure            Plan:         VT is gone  For the NICM -- she is feeling well, and is on GDMT   Exam perfect  ECG unchanged  Device is ok  She has an LV lead that is not in ideal position so is in VVI back up mode    Weight loss has really helped her feel better.

## 2018-09-27 ENCOUNTER — CLINICAL SUPPORT NO REQUIREMENTS (OUTPATIENT)
Dept: CARDIOLOGY | Facility: CLINIC | Age: 48
End: 2018-09-27

## 2018-09-27 ENCOUNTER — TELEPHONE (OUTPATIENT)
Dept: CARDIOLOGY | Facility: CLINIC | Age: 48
End: 2018-09-27

## 2018-09-27 DIAGNOSIS — I42.8 NON-ISCHEMIC CARDIOMYOPATHY (HCC): Primary | ICD-10-CM

## 2018-09-27 PROCEDURE — 93295 DEV INTERROG REMOTE 1/2/MLT: CPT | Performed by: INTERNAL MEDICINE

## 2018-09-27 PROCEDURE — 93296 REM INTERROG EVL PM/IDS: CPT | Performed by: INTERNAL MEDICINE

## 2018-10-01 NOTE — TELEPHONE ENCOUNTER
Just to clarify:  VT1 monitor only zone from 135 to 160bpm?  The reason I am asking she has a vt zone with therapy that is set to 180bpm.  I spoke to her and she has not felt any VT, but does feel her heart rate go up with activity.  She is coming in late December and we will make the change at that time if ok with you to wait that long

## 2018-12-28 ENCOUNTER — CLINICAL SUPPORT NO REQUIREMENTS (OUTPATIENT)
Dept: CARDIOLOGY | Facility: CLINIC | Age: 48
End: 2018-12-28

## 2018-12-28 DIAGNOSIS — I47.20 VENTRICULAR TACHYCARDIA (HCC): ICD-10-CM

## 2018-12-28 DIAGNOSIS — I42.8 NON-ISCHEMIC CARDIOMYOPATHY (HCC): Primary | ICD-10-CM

## 2018-12-28 PROCEDURE — 93282 PRGRMG EVAL IMPLANTABLE DFB: CPT | Performed by: INTERNAL MEDICINE

## 2019-03-01 ENCOUNTER — CLINICAL SUPPORT NO REQUIREMENTS (OUTPATIENT)
Dept: CARDIOLOGY | Facility: CLINIC | Age: 49
End: 2019-03-01

## 2019-03-01 DIAGNOSIS — I42.8 NONISCHEMIC CARDIOMYOPATHY (HCC): Primary | ICD-10-CM

## 2019-03-01 PROCEDURE — 93296 REM INTERROG EVL PM/IDS: CPT | Performed by: INTERNAL MEDICINE

## 2019-03-01 PROCEDURE — 93295 DEV INTERROG REMOTE 1/2/MLT: CPT | Performed by: INTERNAL MEDICINE

## 2019-03-04 ENCOUNTER — TELEPHONE (OUTPATIENT)
Dept: CARDIOLOGY | Facility: CLINIC | Age: 49
End: 2019-03-04

## 2019-03-04 NOTE — TELEPHONE ENCOUNTER
Remote patient initiated transmission received. Patient has had 27 episodes that are either NST or VT-1 monitor zone since last clinic 12/28. All strips appear to be 1:1 SVT.  On 12/29 there was an episode that started in the VT-1 zone but sped up to the VT zone and received ATPx2. The episode slowed back to VT-1 zone and further therapy was averted. Strip below.             Episode continues......skipping a few pages

## 2019-06-24 ENCOUNTER — OFFICE VISIT (OUTPATIENT)
Dept: CARDIOLOGY | Facility: CLINIC | Age: 49
End: 2019-06-24

## 2019-06-24 ENCOUNTER — CLINICAL SUPPORT NO REQUIREMENTS (OUTPATIENT)
Dept: CARDIOLOGY | Facility: CLINIC | Age: 49
End: 2019-06-24

## 2019-06-24 VITALS
SYSTOLIC BLOOD PRESSURE: 102 MMHG | WEIGHT: 176 LBS | DIASTOLIC BLOOD PRESSURE: 68 MMHG | HEART RATE: 76 BPM | HEIGHT: 65 IN | BODY MASS INDEX: 29.32 KG/M2

## 2019-06-24 DIAGNOSIS — Z95.810 S/P ICD (INTERNAL CARDIAC DEFIBRILLATOR) PROCEDURE: ICD-10-CM

## 2019-06-24 DIAGNOSIS — I47.20 VENTRICULAR TACHYCARDIA (HCC): ICD-10-CM

## 2019-06-24 DIAGNOSIS — E66.3 OVERWEIGHT (BMI 25.0-29.9): ICD-10-CM

## 2019-06-24 DIAGNOSIS — I42.9 CARDIOMYOPATHY, UNSPECIFIED TYPE (HCC): Primary | ICD-10-CM

## 2019-06-24 DIAGNOSIS — I42.8 CARDIOMYOPATHY, NONISCHEMIC (HCC): Primary | ICD-10-CM

## 2019-06-24 PROCEDURE — 93282 PRGRMG EVAL IMPLANTABLE DFB: CPT | Performed by: INTERNAL MEDICINE

## 2019-06-24 PROCEDURE — 99213 OFFICE O/P EST LOW 20 MIN: CPT | Performed by: NURSE PRACTITIONER

## 2019-06-24 PROCEDURE — 93000 ELECTROCARDIOGRAM COMPLETE: CPT | Performed by: NURSE PRACTITIONER

## 2019-06-24 NOTE — PROGRESS NOTES
Date of Office Visit: 2019  Encounter Provider: WILD Lyle  Place of Service: [facility] CARDIOLOGY  Patient Name: Tiffani Fiore  :1970    Chief Complaint   Patient presents with   • Rapid Heart Rate     Hx of VT, s/p ablaiton   • Cardiomyopathy     s/p ICD   :     HPI: Tiffani Fiore is a 48 y.o. female who is a patient of Dr. Yancey's with a history of nonischemic cardiomyopathy, status post ICD and VT, status post multiple ablations.  She finally had an epicardial VT ablation at  with Dr. Levy in 2018.  She presents today for routine follow-up.    She feels great.  She denies chest pain, shortness of breath, PND, orthopnea or edema.  She has some occasional brief palpitations but has not had any therapies.    Device shows normal testing and function.  She has a CRT-D device however it is programmed VVI at 40 for back-up pacing only because the LV lead is not in optimal position. Since her last remote check in 3/2019 she has had 17 no therapy, 26 NST and 1 untreated--all asymptomatic. Stored egms's show only 1 true VT on , 11 beats in duration, all others are 1:1 conduction ( she does have a history of PAT).        Past Medical History:   Diagnosis Date   • Cardiomyopathy (CMS/HCC)    • Chest pain    • CHF (congestive heart failure) (CMS/HCC)    • Clostridium difficile colitis    • Depression    • DVT (deep venous thrombosis) (CMS/HCC)    • Dyspnea    • Fatigue    • History of paroxysmal atrial tachycardia    • History of transfusion    • Hyperlipidemia    • Hypertension    • ICD (implantable cardioverter-defibrillator) in place     LEFT SUBCLAVIAN AREA   • Migraine    • Myocardial infarction (CMS/HCC)    • Obesity due to excess calories    • Palpitations     ICD   • PONV (postoperative nausea and vomiting)    • Pulmonary embolism (CMS/HCC)    • S/P ICD (internal cardiac defibrillator) procedure    • SVT (supraventricular  tachycardia) (CMS/HCC)    • Tachycardia        Past Surgical History:   Procedure Laterality Date   • ABLATION OF DYSRHYTHMIC FOCUS  2018    Multiple VT ablations, last on at -Dr Levy, epicardial VT 2018   • BRONCHOSCOPY N/A 3/8/2018    Procedure: BRONCHOSCOPY WITH BAL;  Surgeon: Berto Nicholson MD;  Location: [facility] ENDOSCOPY;  Service:    • CARDIAC CATHETERIZATION N/A 2017    Procedure: Left Heart Cath;  Surgeon: Saúl Ann MD;  Location: [facility] INVASIVE LOCATION;  Service:    • CARDIAC CATHETERIZATION N/A 2017    Procedure: Left ventriculography;  Surgeon: Saúl Ann MD;  Location: [facility] INVASIVE LOCATION;  Service:    • CARDIAC CATHETERIZATION N/A 2017    Procedure: Coronary angiography;  Surgeon: Saúl Ann MD;  Location: [facility] INVASIVE LOCATION;  Service:    • CARDIAC DEFIBRILLATOR PLACEMENT     • CARDIAC DEFIBRILLATOR PLACEMENT      CONNER DID LEAD REVISION WITH REPLACEMENT   • CARDIAC DEFIBRILLATOR PLACEMENT     • CARDIAC DEFIBRILLATOR PLACEMENT     • CARDIAC ELECTROPHYSIOLOGY PROCEDURE N/A 2017    Procedure: Ablation VT  PATIENT HAS [place] ICD;  Surgeon: Grayson Yancey MD;  Location: [facility] INVASIVE LOCATION;  Service:    •  SECTION     • HYSTERECTOMY     • IMPLANTABLE CARDIOVERTER DEFIBRILLATOR LEAD REPLACEMENT/POCKET REVISION N/A 10/20/2017    Procedure: ICD LEAD EXTRACTION TRANSVENOUS AND PLACEMENT OF RV AND LV LEAD;  Surgeon: Amrit Salgado MD;  Location: [facility] OR ;  Service:    • TUBAL ABDOMINAL LIGATION         Social History     Socioeconomic History   • Marital status: Single     Spouse name: Not on file   • Number of children: Not on file   • Years of education: Not on file   • Highest education level: Not on file   Tobacco Use   • Smoking status: Former Smoker     Packs/day: 0.50     Years: 15.00     Pack years: 7.50     Types: Cigarettes     Last attempt to quit:      Years since quitting:  "7.4   • Smokeless tobacco: Former User   • Tobacco comment: quit age 42   Substance and Sexual Activity   • Alcohol use: No   • Drug use: No   • Sexual activity: Defer       Family History   Problem Relation Age of Onset   • Diabetes Father    • Cancer Father         lung   • COPD Mother    • Malig Hyperthermia Neg Hx        Review of Systems   Constitution: Negative for chills, fever and malaise/fatigue.   Cardiovascular: Negative for chest pain, dyspnea on exertion, leg swelling, near-syncope, orthopnea, palpitations, paroxysmal nocturnal dyspnea and syncope.   Respiratory: Negative for cough and shortness of breath.    Musculoskeletal: Negative for joint pain, joint swelling and myalgias.   Gastrointestinal: Negative for abdominal pain, diarrhea, melena, nausea and vomiting.   Genitourinary: Negative for frequency and hematuria.   Neurological: Negative for light-headedness, numbness, paresthesias and seizures.   Allergic/Immunologic: Negative.    All other systems reviewed and are negative.      No Known Allergies      Current Outpatient Medications:   •  ALPRAZolam (XANAX) 0.25 MG tablet, Take 0.25 mg by mouth 2 (Two) Times a Day As Needed for Anxiety (dosage unklnown)., Disp: , Rfl:   •  aspirin 81 MG tablet, Take 81 mg by mouth Daily. HOLD PRIOR TO SURGERY, Disp: , Rfl:   •  carvedilol (COREG) 6.25 MG tablet, Take 2 tablets by mouth Every Morning., Disp: 180 tablet, Rfl: 1  •  lisinopril (PRINIVIL,ZESTRIL) 20 MG tablet, Take 20 mg by mouth Daily., Disp: , Rfl:   •  ondansetron (ZOFRAN) 4 MG tablet, Take 1 tablet by mouth Every 8 (Eight) Hours As Needed for Nausea or Vomiting., Disp: 15 tablet, Rfl: 0      Objective:     Vitals:    06/24/19 0924   BP: 102/68   Pulse: 76   Weight: 79.8 kg (176 lb)   Height: 165.1 cm (65\")     Body mass index is 29.29 kg/m².    PHYSICAL EXAM:    Vitals Reviewed.   General Appearance: No acute distress, well developed and well nourished.   Eyes: Conjunctiva and lids: No erythema, " swelling, or discharge. Sclera non-icteric.   HENT: Atraumatic, normocephalic. External eyes, ears, and nose normal.   Respiratory: No signs of respiratory distress. Respiration rhythm and depth normal.   Clear to auscultation. No rales, crackles, rhonchi, or wheezing auscultated.   Cardiovascular:  Jugular Venous Pressure: Normal  Heart Rate and Rhythm: Normal, Heart Sounds: Normal S1 and S2. No S3 or S4 noted.  Murmurs: No murmurs noted. No rubs, thrills, or gallops.   Arterial Pulses:  Posterior tibialis and dorsalis pedis pulses normal.   Lower Extremities: No edema noted.  Gastrointestinal:  Abdomen soft, non-distended, non-tender.   Musculoskeletal: Normal movement of extremities  Skin and Nails: General appearance normal. No pallor, cyanosis, diaphoresis. Skin temperature normal. No clubbing of fingernails.   Psychiatric: Patient alert and oriented to person, place, and time. Speech and behavior appropriate. Normal mood and affect.       ECG 12 Lead  Date/Time: 6/24/2019 9:13 AM  Performed by: Maci Menchaca APRN  Authorized by: Maci Menchaca APRN   Comparison: compared with previous ECG from 6/22/2018  Similar to previous ECG  Rhythm: sinus rhythm  BPM: 76  Conduction: left bundle branch block              Assessment:       Diagnosis Plan   1. Cardiomyopathy, nonischemic (CMS/HCC)  ECG 12 Lead   2. Ventricular tachycardia (CMS/HCC)  ECG 12 Lead   3. S/P ICD (internal cardiac defibrillator) procedure  ECG 12 Lead   4. Overweight (BMI 25.0-29.9)            Plan:       1.-3. NICM, s/p ICD, VT, s/p multiple ablation, last one at  (epicardial) with Dr. Levy. Doing well, 1--11 beat run, asymptomatic. On GDMT, no HF by exam.     4. Overweight, she has really been working hard on weight loss and feels a lot better and is doing well.     Follow up with Dr. Yancey in 6 months.    As always, it has been a pleasure to participate in your patient's care.      Sincerely,         WILD Steele

## 2019-09-30 ENCOUNTER — CLINICAL SUPPORT NO REQUIREMENTS (OUTPATIENT)
Dept: CARDIOLOGY | Facility: CLINIC | Age: 49
End: 2019-09-30

## 2019-09-30 DIAGNOSIS — I42.8 NONISCHEMIC CARDIOMYOPATHY (HCC): Primary | ICD-10-CM

## 2019-09-30 PROCEDURE — 93295 DEV INTERROG REMOTE 1/2/MLT: CPT | Performed by: INTERNAL MEDICINE

## 2019-09-30 PROCEDURE — 93296 REM INTERROG EVL PM/IDS: CPT | Performed by: INTERNAL MEDICINE

## 2020-01-08 ENCOUNTER — CLINICAL SUPPORT NO REQUIREMENTS (OUTPATIENT)
Dept: CARDIOLOGY | Facility: CLINIC | Age: 50
End: 2020-01-08

## 2020-01-08 DIAGNOSIS — I42.8 NONISCHEMIC CARDIOMYOPATHY (HCC): Primary | ICD-10-CM

## 2020-01-08 PROCEDURE — 93296 REM INTERROG EVL PM/IDS: CPT | Performed by: INTERNAL MEDICINE

## 2020-01-08 PROCEDURE — 93295 DEV INTERROG REMOTE 1/2/MLT: CPT | Performed by: INTERNAL MEDICINE

## 2020-01-17 ENCOUNTER — CLINICAL SUPPORT NO REQUIREMENTS (OUTPATIENT)
Dept: CARDIOLOGY | Facility: CLINIC | Age: 50
End: 2020-01-17

## 2020-01-17 DIAGNOSIS — I42.8 NON-ISCHEMIC CARDIOMYOPATHY (HCC): Primary | ICD-10-CM

## 2020-02-24 ENCOUNTER — HOSPITAL ENCOUNTER (INPATIENT)
Facility: HOSPITAL | Age: 50
LOS: 2 days | Discharge: HOME | End: 2020-02-26
Attending: EMERGENCY MEDICINE | Admitting: INTERNAL MEDICINE

## 2020-02-24 ENCOUNTER — TELEPHONE (OUTPATIENT)
Dept: CARDIOLOGY | Facility: CLINIC | Age: 50
End: 2020-02-24

## 2020-02-24 ENCOUNTER — APPOINTMENT (OUTPATIENT)
Dept: GENERAL RADIOLOGY | Facility: HOSPITAL | Age: 50
End: 2020-02-24

## 2020-02-24 ENCOUNTER — CLINICAL SUPPORT NO REQUIREMENTS (OUTPATIENT)
Dept: CARDIOLOGY | Facility: CLINIC | Age: 50
End: 2020-02-24

## 2020-02-24 DIAGNOSIS — I47.20 RECURRENT VENTRICULAR TACHYCARDIA (HCC): Primary | ICD-10-CM

## 2020-02-24 DIAGNOSIS — I42.8 NICM (NONISCHEMIC CARDIOMYOPATHY) (HCC): Primary | ICD-10-CM

## 2020-02-24 LAB
ALBUMIN SERPL-MCNC: 4.3 G/DL (ref 3.5–5.2)
ALBUMIN/GLOB SERPL: 1.8 G/DL
ALP SERPL-CCNC: 88 U/L (ref 39–117)
ALT SERPL W P-5'-P-CCNC: 12 U/L (ref 1–33)
ANION GAP SERPL CALCULATED.3IONS-SCNC: 12 MMOL/L (ref 5–15)
AST SERPL-CCNC: 12 U/L (ref 1–32)
BASOPHILS # BLD AUTO: 0.03 10*3/MM3 (ref 0–0.2)
BASOPHILS NFR BLD AUTO: 0.3 % (ref 0–1.5)
BILIRUB SERPL-MCNC: 0.5 MG/DL (ref 0.2–1.2)
BUN BLD-MCNC: 15 MG/DL (ref 6–20)
BUN/CREAT SERPL: 19.5 (ref 7–25)
CALCIUM SPEC-SCNC: 9.2 MG/DL (ref 8.6–10.5)
CHLORIDE SERPL-SCNC: 103 MMOL/L (ref 98–107)
CO2 SERPL-SCNC: 27 MMOL/L (ref 22–29)
CREAT BLD-MCNC: 0.77 MG/DL (ref 0.57–1)
DEPRECATED RDW RBC AUTO: 39.9 FL (ref 37–54)
EOSINOPHIL # BLD AUTO: 0 10*3/MM3 (ref 0–0.4)
EOSINOPHIL NFR BLD AUTO: 0 % (ref 0.3–6.2)
ERYTHROCYTE [DISTWIDTH] IN BLOOD BY AUTOMATED COUNT: 12 % (ref 12.3–15.4)
GFR SERPL CREATININE-BSD FRML MDRD: 80 ML/MIN/1.73
GLOBULIN UR ELPH-MCNC: 2.4 GM/DL
GLUCOSE BLD-MCNC: 117 MG/DL (ref 65–99)
HCT VFR BLD AUTO: 39 % (ref 34–46.6)
HGB BLD-MCNC: 13.3 G/DL (ref 12–15.9)
IMM GRANULOCYTES # BLD AUTO: 0.03 10*3/MM3 (ref 0–0.05)
IMM GRANULOCYTES NFR BLD AUTO: 0.3 % (ref 0–0.5)
LYMPHOCYTES # BLD AUTO: 1.85 10*3/MM3 (ref 0.7–3.1)
LYMPHOCYTES NFR BLD AUTO: 20.1 % (ref 19.6–45.3)
MAGNESIUM SERPL-MCNC: 1.9 MG/DL (ref 1.6–2.6)
MCH RBC QN AUTO: 30.9 PG (ref 26.6–33)
MCHC RBC AUTO-ENTMCNC: 34.1 G/DL (ref 31.5–35.7)
MCV RBC AUTO: 90.7 FL (ref 79–97)
MONOCYTES # BLD AUTO: 0.58 10*3/MM3 (ref 0.1–0.9)
MONOCYTES NFR BLD AUTO: 6.3 % (ref 5–12)
NEUTROPHILS # BLD AUTO: 6.72 10*3/MM3 (ref 1.7–7)
NEUTROPHILS NFR BLD AUTO: 73 % (ref 42.7–76)
NRBC BLD AUTO-RTO: 0 /100 WBC (ref 0–0.2)
PLATELET # BLD AUTO: 171 10*3/MM3 (ref 140–450)
PMV BLD AUTO: 10.1 FL (ref 6–12)
POTASSIUM BLD-SCNC: 3.5 MMOL/L (ref 3.5–5.2)
PROT SERPL-MCNC: 6.7 G/DL (ref 6–8.5)
RBC # BLD AUTO: 4.3 10*6/MM3 (ref 3.77–5.28)
SODIUM BLD-SCNC: 142 MMOL/L (ref 136–145)
TROPONIN T SERPL-MCNC: <0.01 NG/ML (ref 0–0.03)
WBC NRBC COR # BLD: 9.21 10*3/MM3 (ref 3.4–10.8)

## 2020-02-24 PROCEDURE — 25010000002 AMIODARONE IN DEXTROSE 5% 360-4.14 MG/200ML-% SOLUTION: Performed by: EMERGENCY MEDICINE

## 2020-02-24 PROCEDURE — 83735 ASSAY OF MAGNESIUM: CPT | Performed by: EMERGENCY MEDICINE

## 2020-02-24 PROCEDURE — 99223 1ST HOSP IP/OBS HIGH 75: CPT | Performed by: NURSE PRACTITIONER

## 2020-02-24 PROCEDURE — 25010000002 AMIODARONE IN DEXTROSE 5% 150-4.21 MG/100ML-% SOLUTION: Performed by: EMERGENCY MEDICINE

## 2020-02-24 PROCEDURE — 71045 X-RAY EXAM CHEST 1 VIEW: CPT

## 2020-02-24 PROCEDURE — 80053 COMPREHEN METABOLIC PANEL: CPT | Performed by: EMERGENCY MEDICINE

## 2020-02-24 PROCEDURE — 84484 ASSAY OF TROPONIN QUANT: CPT | Performed by: EMERGENCY MEDICINE

## 2020-02-24 PROCEDURE — 99284 EMERGENCY DEPT VISIT MOD MDM: CPT

## 2020-02-24 PROCEDURE — 93005 ELECTROCARDIOGRAM TRACING: CPT | Performed by: EMERGENCY MEDICINE

## 2020-02-24 PROCEDURE — 85025 COMPLETE CBC W/AUTO DIFF WBC: CPT | Performed by: EMERGENCY MEDICINE

## 2020-02-24 RX ORDER — ASPIRIN 81 MG/1
81 TABLET ORAL DAILY
Status: DISCONTINUED | OUTPATIENT
Start: 2020-02-24 | End: 2020-02-26 | Stop reason: HOSPADM

## 2020-02-24 RX ORDER — NITROGLYCERIN 0.4 MG/1
0.4 TABLET SUBLINGUAL
Status: DISCONTINUED | OUTPATIENT
Start: 2020-02-24 | End: 2020-02-26 | Stop reason: HOSPADM

## 2020-02-24 RX ORDER — SODIUM CHLORIDE 0.9 % (FLUSH) 0.9 %
10 SYRINGE (ML) INJECTION AS NEEDED
Status: DISCONTINUED | OUTPATIENT
Start: 2020-02-24 | End: 2020-02-26 | Stop reason: HOSPADM

## 2020-02-24 RX ORDER — LISINOPRIL 20 MG/1
20 TABLET ORAL DAILY
Status: DISCONTINUED | OUTPATIENT
Start: 2020-02-25 | End: 2020-02-26 | Stop reason: HOSPADM

## 2020-02-24 RX ORDER — ALPRAZOLAM 0.25 MG/1
0.25 TABLET ORAL 2 TIMES DAILY PRN
Status: DISCONTINUED | OUTPATIENT
Start: 2020-02-24 | End: 2020-02-26 | Stop reason: HOSPADM

## 2020-02-24 RX ORDER — ONDANSETRON 4 MG/1
4 TABLET, FILM COATED ORAL EVERY 8 HOURS PRN
Status: DISCONTINUED | OUTPATIENT
Start: 2020-02-24 | End: 2020-02-26 | Stop reason: HOSPADM

## 2020-02-24 RX ORDER — SODIUM CHLORIDE 0.9 % (FLUSH) 0.9 %
10 SYRINGE (ML) INJECTION EVERY 12 HOURS SCHEDULED
Status: DISCONTINUED | OUTPATIENT
Start: 2020-02-24 | End: 2020-02-26 | Stop reason: HOSPADM

## 2020-02-24 RX ORDER — AMIODARONE HYDROCHLORIDE 50 MG/ML
150 INJECTION, SOLUTION INTRAVENOUS ONCE
Status: DISCONTINUED | OUTPATIENT
Start: 2020-02-24 | End: 2020-02-24

## 2020-02-24 RX ORDER — ACETAMINOPHEN 325 MG/1
650 TABLET ORAL EVERY 4 HOURS PRN
Status: DISCONTINUED | OUTPATIENT
Start: 2020-02-24 | End: 2020-02-26 | Stop reason: HOSPADM

## 2020-02-24 RX ORDER — CARVEDILOL 12.5 MG/1
12.5 TABLET ORAL EVERY MORNING
Status: DISCONTINUED | OUTPATIENT
Start: 2020-02-25 | End: 2020-02-26 | Stop reason: HOSPADM

## 2020-02-24 RX ADMIN — AMIODARONE HYDROCHLORIDE 1 MG/MIN: 1.8 INJECTION, SOLUTION INTRAVENOUS at 18:20

## 2020-02-24 RX ADMIN — AMIODARONE HYDROCHLORIDE 150 MG: 1.5 INJECTION, SOLUTION INTRAVENOUS at 18:01

## 2020-02-24 NOTE — TELEPHONE ENCOUNTER
"Pt called and states she is at work in \"full blown VT\".  She has been shocked once.  Her employer is calling 911 as she was calling.  She said her chest was burning.  "

## 2020-02-25 ENCOUNTER — APPOINTMENT (OUTPATIENT)
Dept: CARDIOLOGY | Facility: HOSPITAL | Age: 50
End: 2020-02-25

## 2020-02-25 ENCOUNTER — APPOINTMENT (OUTPATIENT)
Dept: CT IMAGING | Facility: HOSPITAL | Age: 50
End: 2020-02-25

## 2020-02-25 LAB
ANION GAP SERPL CALCULATED.3IONS-SCNC: 12.2 MMOL/L (ref 5–15)
BH CV ECHO MEAS - ACS: 1.9 CM
BH CV ECHO MEAS - AO MAX PG (FULL): 6.3 MMHG
BH CV ECHO MEAS - AO MAX PG: 10.5 MMHG
BH CV ECHO MEAS - AO MEAN PG (FULL): 4 MMHG
BH CV ECHO MEAS - AO MEAN PG: 6 MMHG
BH CV ECHO MEAS - AO ROOT AREA (BSA CORRECTED): 1.4
BH CV ECHO MEAS - AO ROOT AREA: 5.7 CM^2
BH CV ECHO MEAS - AO ROOT DIAM: 2.7 CM
BH CV ECHO MEAS - AO V2 MAX: 162 CM/SEC
BH CV ECHO MEAS - AO V2 MEAN: 115 CM/SEC
BH CV ECHO MEAS - AO V2 VTI: 32.3 CM
BH CV ECHO MEAS - ASC AORTA: 2.1 CM
BH CV ECHO MEAS - AVA(I,A): 2 CM^2
BH CV ECHO MEAS - AVA(I,D): 2 CM^2
BH CV ECHO MEAS - AVA(V,A): 2 CM^2
BH CV ECHO MEAS - AVA(V,D): 2 CM^2
BH CV ECHO MEAS - BSA(HAYCOCK): 1.9 M^2
BH CV ECHO MEAS - BSA: 1.9 M^2
BH CV ECHO MEAS - BZI_BMI: 29.3 KILOGRAMS/M^2
BH CV ECHO MEAS - BZI_METRIC_HEIGHT: 165.1 CM
BH CV ECHO MEAS - BZI_METRIC_WEIGHT: 79.8 KG
BH CV ECHO MEAS - EDV(CUBED): 148.9 ML
BH CV ECHO MEAS - EDV(MOD-SP2): 150 ML
BH CV ECHO MEAS - EDV(MOD-SP4): 227 ML
BH CV ECHO MEAS - EDV(TEICH): 135.3 ML
BH CV ECHO MEAS - EF(CUBED): 16 %
BH CV ECHO MEAS - EF(MOD-BP): 33 %
BH CV ECHO MEAS - EF(MOD-SP2): 29.3 %
BH CV ECHO MEAS - EF(MOD-SP4): 34.4 %
BH CV ECHO MEAS - EF(TEICH): 12.6 %
BH CV ECHO MEAS - ESV(CUBED): 125 ML
BH CV ECHO MEAS - ESV(MOD-SP2): 106 ML
BH CV ECHO MEAS - ESV(MOD-SP4): 149 ML
BH CV ECHO MEAS - ESV(TEICH): 118.2 ML
BH CV ECHO MEAS - FS: 5.7 %
BH CV ECHO MEAS - IVS/LVPW: 0.91
BH CV ECHO MEAS - IVSD: 1 CM
BH CV ECHO MEAS - LAT PEAK E' VEL: 6.4 CM/SEC
BH CV ECHO MEAS - LV DIASTOLIC VOL/BSA (35-75): 121.2 ML/M^2
BH CV ECHO MEAS - LV MASS(C)D: 213.9 GRAMS
BH CV ECHO MEAS - LV MASS(C)DI: 114.2 GRAMS/M^2
BH CV ECHO MEAS - LV MAX PG: 4.2 MMHG
BH CV ECHO MEAS - LV MEAN PG: 2 MMHG
BH CV ECHO MEAS - LV SYSTOLIC VOL/BSA (12-30): 79.5 ML/M^2
BH CV ECHO MEAS - LV V1 MAX: 102 CM/SEC
BH CV ECHO MEAS - LV V1 MEAN: 72.6 CM/SEC
BH CV ECHO MEAS - LV V1 VTI: 20.2 CM
BH CV ECHO MEAS - LVIDD: 5.3 CM
BH CV ECHO MEAS - LVIDS: 5 CM
BH CV ECHO MEAS - LVLD AP2: 9 CM
BH CV ECHO MEAS - LVLD AP4: 9.3 CM
BH CV ECHO MEAS - LVLS AP2: 8.2 CM
BH CV ECHO MEAS - LVLS AP4: 8.3 CM
BH CV ECHO MEAS - LVOT AREA (M): 3.1 CM^2
BH CV ECHO MEAS - LVOT AREA: 3.1 CM^2
BH CV ECHO MEAS - LVOT DIAM: 2 CM
BH CV ECHO MEAS - LVPWD: 1.1 CM
BH CV ECHO MEAS - MED PEAK E' VEL: 8.4 CM/SEC
BH CV ECHO MEAS - MR MAX PG: 52.7 MMHG
BH CV ECHO MEAS - MR MAX VEL: 363 CM/SEC
BH CV ECHO MEAS - MV A DUR: 0.16 SEC
BH CV ECHO MEAS - MV A MAX VEL: 107 CM/SEC
BH CV ECHO MEAS - MV DEC SLOPE: 368 CM/SEC^2
BH CV ECHO MEAS - MV DEC TIME: 0.19 SEC
BH CV ECHO MEAS - MV E MAX VEL: 89.2 CM/SEC
BH CV ECHO MEAS - MV E/A: 0.83
BH CV ECHO MEAS - MV MAX PG: 4.2 MMHG
BH CV ECHO MEAS - MV MEAN PG: 3 MMHG
BH CV ECHO MEAS - MV P1/2T MAX VEL: 106 CM/SEC
BH CV ECHO MEAS - MV P1/2T: 84.4 MSEC
BH CV ECHO MEAS - MV V2 MAX: 103 CM/SEC
BH CV ECHO MEAS - MV V2 MEAN: 79.2 CM/SEC
BH CV ECHO MEAS - MV V2 VTI: 30.6 CM
BH CV ECHO MEAS - MVA P1/2T LCG: 2.1 CM^2
BH CV ECHO MEAS - MVA(P1/2T): 2.6 CM^2
BH CV ECHO MEAS - MVA(VTI): 2.1 CM^2
BH CV ECHO MEAS - PA ACC TIME: 0.1 SEC
BH CV ECHO MEAS - PA MAX PG (FULL): 4.1 MMHG
BH CV ECHO MEAS - PA MAX PG: 5.2 MMHG
BH CV ECHO MEAS - PA PR(ACCEL): 34.5 MMHG
BH CV ECHO MEAS - PA V2 MAX: 114 CM/SEC
BH CV ECHO MEAS - PULM A REVS DUR: 0.17 SEC
BH CV ECHO MEAS - PULM A REVS VEL: 36.2 CM/SEC
BH CV ECHO MEAS - PULM DIAS VEL: 30.5 CM/SEC
BH CV ECHO MEAS - PULM S/D: 1.5
BH CV ECHO MEAS - PULM SYS VEL: 44.4 CM/SEC
BH CV ECHO MEAS - PVA(V,A): 1.2 CM^2
BH CV ECHO MEAS - PVA(V,D): 1.2 CM^2
BH CV ECHO MEAS - QP/QS: 0.44
BH CV ECHO MEAS - RAP SYSTOLE: 3 MMHG
BH CV ECHO MEAS - RV MAX PG: 1.1 MMHG
BH CV ECHO MEAS - RV MEAN PG: 1 MMHG
BH CV ECHO MEAS - RV V1 MAX: 52.5 CM/SEC
BH CV ECHO MEAS - RV V1 MEAN: 38.7 CM/SEC
BH CV ECHO MEAS - RV V1 VTI: 10.9 CM
BH CV ECHO MEAS - RVOT AREA: 2.5 CM^2
BH CV ECHO MEAS - RVOT DIAM: 1.8 CM
BH CV ECHO MEAS - RVSP: 35.9 MMHG
BH CV ECHO MEAS - SI(AO): 98.7 ML/M^2
BH CV ECHO MEAS - SI(CUBED): 12.7 ML/M^2
BH CV ECHO MEAS - SI(LVOT): 33.9 ML/M^2
BH CV ECHO MEAS - SI(MOD-SP2): 23.5 ML/M^2
BH CV ECHO MEAS - SI(MOD-SP4): 41.6 ML/M^2
BH CV ECHO MEAS - SI(TEICH): 9.1 ML/M^2
BH CV ECHO MEAS - SV(AO): 184.9 ML
BH CV ECHO MEAS - SV(CUBED): 23.9 ML
BH CV ECHO MEAS - SV(LVOT): 63.5 ML
BH CV ECHO MEAS - SV(MOD-SP2): 44 ML
BH CV ECHO MEAS - SV(MOD-SP4): 78 ML
BH CV ECHO MEAS - SV(RVOT): 27.7 ML
BH CV ECHO MEAS - SV(TEICH): 17.1 ML
BH CV ECHO MEAS - TAPSE (>1.6): 2.3 CM
BH CV ECHO MEAS - TR MAX VEL: 287 CM/SEC
BH CV ECHO MEASUREMENTS AVERAGE E/E' RATIO: 12.05
BH CV XLRA - RV BASE: 3.3 CM
BH CV XLRA - RV LENGTH: 8.6 CM
BH CV XLRA - RV MID: 2.6 CM
BH CV XLRA - TDI S': 9.6 CM/SEC
BUN BLD-MCNC: 14 MG/DL (ref 6–20)
BUN/CREAT SERPL: 20.9 (ref 7–25)
CALCIUM SPEC-SCNC: 8.5 MG/DL (ref 8.6–10.5)
CHLORIDE SERPL-SCNC: 104 MMOL/L (ref 98–107)
CO2 SERPL-SCNC: 21.8 MMOL/L (ref 22–29)
CREAT BLD-MCNC: 0.67 MG/DL (ref 0.57–1)
GFR SERPL CREATININE-BSD FRML MDRD: 94 ML/MIN/1.73
GLUCOSE BLD-MCNC: 104 MG/DL (ref 65–99)
LEFT ATRIUM VOLUME INDEX: 27 ML/M2
POTASSIUM BLD-SCNC: 3.9 MMOL/L (ref 3.5–5.2)
SODIUM BLD-SCNC: 138 MMOL/L (ref 136–145)

## 2020-02-25 PROCEDURE — 93306 TTE W/DOPPLER COMPLETE: CPT

## 2020-02-25 PROCEDURE — 36415 COLL VENOUS BLD VENIPUNCTURE: CPT | Performed by: NURSE PRACTITIONER

## 2020-02-25 PROCEDURE — 25010000002 IOPAMIDOL 61 % SOLUTION: Performed by: INTERNAL MEDICINE

## 2020-02-25 PROCEDURE — 99232 SBSQ HOSP IP/OBS MODERATE 35: CPT | Performed by: NURSE PRACTITIONER

## 2020-02-25 PROCEDURE — 80048 BASIC METABOLIC PNL TOTAL CA: CPT | Performed by: NURSE PRACTITIONER

## 2020-02-25 PROCEDURE — 25010000002 AMIODARONE IN DEXTROSE 5% 360-4.14 MG/200ML-% SOLUTION: Performed by: NURSE PRACTITIONER

## 2020-02-25 PROCEDURE — 71260 CT THORAX DX C+: CPT

## 2020-02-25 PROCEDURE — 93306 TTE W/DOPPLER COMPLETE: CPT | Performed by: INTERNAL MEDICINE

## 2020-02-25 PROCEDURE — 25010000002 PERFLUTREN (DEFINITY) 8.476 MG IN SODIUM CHLORIDE (PF) 0.9 % 10 ML INJECTION: Performed by: NURSE PRACTITIONER

## 2020-02-25 RX ADMIN — SODIUM CHLORIDE, PRESERVATIVE FREE 10 ML: 5 INJECTION INTRAVENOUS at 09:47

## 2020-02-25 RX ADMIN — ACETAMINOPHEN 650 MG: 325 TABLET, FILM COATED ORAL at 18:27

## 2020-02-25 RX ADMIN — PERFLUTREN 2 ML: 6.52 INJECTION, SUSPENSION INTRAVENOUS at 16:15

## 2020-02-25 RX ADMIN — AMIODARONE HYDROCHLORIDE 1 MG/MIN: 1.8 INJECTION, SOLUTION INTRAVENOUS at 11:28

## 2020-02-25 RX ADMIN — ASPIRIN 81 MG: 81 TABLET, COATED ORAL at 09:47

## 2020-02-25 RX ADMIN — AMIODARONE HYDROCHLORIDE 0.5 MG/MIN: 1.8 INJECTION, SOLUTION INTRAVENOUS at 01:40

## 2020-02-25 RX ADMIN — SODIUM CHLORIDE, PRESERVATIVE FREE 10 ML: 5 INJECTION INTRAVENOUS at 20:00

## 2020-02-25 RX ADMIN — IOPAMIDOL 75 ML: 612 INJECTION, SOLUTION INTRAVENOUS at 09:17

## 2020-02-26 VITALS
OXYGEN SATURATION: 99 % | RESPIRATION RATE: 16 BRPM | HEIGHT: 65 IN | WEIGHT: 176 LBS | TEMPERATURE: 97.8 F | SYSTOLIC BLOOD PRESSURE: 106 MMHG | DIASTOLIC BLOOD PRESSURE: 71 MMHG | HEART RATE: 73 BPM | BODY MASS INDEX: 29.32 KG/M2

## 2020-02-26 LAB
ANION GAP SERPL CALCULATED.3IONS-SCNC: 9.7 MMOL/L (ref 5–15)
BUN BLD-MCNC: 15 MG/DL (ref 6–20)
BUN/CREAT SERPL: 22.7 (ref 7–25)
CALCIUM SPEC-SCNC: 8.5 MG/DL (ref 8.6–10.5)
CHLORIDE SERPL-SCNC: 106 MMOL/L (ref 98–107)
CO2 SERPL-SCNC: 23.3 MMOL/L (ref 22–29)
CREAT BLD-MCNC: 0.66 MG/DL (ref 0.57–1)
GFR SERPL CREATININE-BSD FRML MDRD: 95 ML/MIN/1.73
GLUCOSE BLD-MCNC: 98 MG/DL (ref 65–99)
POTASSIUM BLD-SCNC: 3.9 MMOL/L (ref 3.5–5.2)
SODIUM BLD-SCNC: 139 MMOL/L (ref 136–145)

## 2020-02-26 PROCEDURE — 36415 COLL VENOUS BLD VENIPUNCTURE: CPT | Performed by: NURSE PRACTITIONER

## 2020-02-26 PROCEDURE — 99238 HOSP IP/OBS DSCHRG MGMT 30/<: CPT | Performed by: NURSE PRACTITIONER

## 2020-02-26 PROCEDURE — 80048 BASIC METABOLIC PNL TOTAL CA: CPT | Performed by: NURSE PRACTITIONER

## 2020-02-26 RX ADMIN — LISINOPRIL 20 MG: 20 TABLET ORAL at 08:22

## 2020-02-26 RX ADMIN — SODIUM CHLORIDE, PRESERVATIVE FREE 10 ML: 5 INJECTION INTRAVENOUS at 08:22

## 2020-02-26 RX ADMIN — ASPIRIN 81 MG: 81 TABLET, COATED ORAL at 08:22

## 2020-03-01 ENCOUNTER — CLINICAL SUPPORT NO REQUIREMENTS (OUTPATIENT)
Dept: CARDIOLOGY | Facility: CLINIC | Age: 50
End: 2020-03-01

## 2020-03-01 DIAGNOSIS — I47.20 V-TACH (HCC): Primary | ICD-10-CM

## 2020-03-02 ENCOUNTER — TELEPHONE (OUTPATIENT)
Dept: CARDIOLOGY | Facility: CLINIC | Age: 50
End: 2020-03-02

## 2020-03-02 RX ORDER — CARVEDILOL 6.25 MG/1
12.5 TABLET ORAL EVERY MORNING
Qty: 180 TABLET | Refills: 1 | Status: SHIPPED | OUTPATIENT
Start: 2020-03-02 | End: 2020-10-19

## 2020-03-02 NOTE — TELEPHONE ENCOUNTER
Unscheduled remote transmission received 3/1/2020 and reviewed 3/2/2020. Alert states patient initiated. I called patient. She states that she sent because she wanted to make sure that her home monitor was working, she is not having any symptoms. Presenting EGM AS/ bpm. AP=0% BIV pace=0%. No new episodes. Will follow up as scheduled.

## 2020-03-19 RX ORDER — LISINOPRIL 20 MG/1
20 TABLET ORAL DAILY
Qty: 90 TABLET | Refills: 1 | Status: SHIPPED | OUTPATIENT
Start: 2020-03-19 | End: 2020-10-19

## 2020-03-27 ENCOUNTER — TELEMEDICINE (OUTPATIENT)
Dept: CARDIOLOGY | Facility: CLINIC | Age: 50
End: 2020-03-27

## 2020-03-27 VITALS — BODY MASS INDEX: 30.32 KG/M2 | WEIGHT: 182 LBS | HEIGHT: 65 IN

## 2020-03-27 DIAGNOSIS — R91.1 NODULE OF RIGHT LUNG: ICD-10-CM

## 2020-03-27 DIAGNOSIS — I47.20 VENTRICULAR TACHYCARDIA (HCC): Primary | ICD-10-CM

## 2020-03-27 DIAGNOSIS — I42.8 CARDIOMYOPATHY, NONISCHEMIC (HCC): ICD-10-CM

## 2020-03-27 PROCEDURE — 99212 OFFICE O/P EST SF 10 MIN: CPT | Performed by: INTERNAL MEDICINE

## 2020-03-27 NOTE — PROGRESS NOTES
Date of Office Visit: 2020  Encounter Provider: Grayson Yancey MD  Place of Service: [facility] CARDIOLOGY  Patient Name: Tiffani Fiore  : 1970    Subjective:     Encounter Date:2020      Patient ID: Tiffani Fiore is a 49 y.o. female who has a cc of  VT and ablation here and at . And NICM     She is out of town now - she has had a recurrence recently that stopped with IV amio.     AFter discussion with Dr Levy.     She had an episode last week of tachycardia but it felt different from the time she got shocked at work     This time no syncope.THis time it felt more like the VT of old.         The patient had a good year.   No anginal chest pain,   No sig rodriguez,   No soa,   No fainting,  No orthostasis.   No edema.   Exercise tolerance:     There have been no hospital admission since the last visit.     There have been no bleeding events.       Past Medical History:   Diagnosis Date   • Cardiomyopathy (CMS/[facility])    • Chest pain    • CHF (congestive heart failure) (CMS/[facility])    • Clostridium difficile colitis    • Depression    • DVT (deep venous thrombosis) (CMS/[facility])    • Dyspnea    • Fatigue    • History of paroxysmal atrial tachycardia    • History of transfusion    • Hyperlipidemia    • Hypertension    • ICD (implantable cardioverter-defibrillator) in place     LEFT SUBCLAVIAN AREA   • Migraine    • Myocardial infarction (CMS/[facility])    • Obesity due to excess calories    • Palpitations     ICD   • PONV (postoperative nausea and vomiting)    • Pulmonary embolism (CMS/[facility])    • S/P ICD (internal cardiac defibrillator) procedure    • SVT (supraventricular tachycardia) (CMS/[facility])    • Tachycardia        Social History     Socioeconomic History   • Marital status: Single     Spouse name: Not on file   • Number of children: Not on file   • Years of education: Not on file   • Highest education level: Not on file   Tobacco Use   • Smoking status: Former  "Smoker     Packs/day: 0.50     Years: 15.00     Pack years: 7.50     Types: Cigarettes     Last attempt to quit: 2012     Years since quittin.2   • Smokeless tobacco: Former User   • Tobacco comment: quit age 42   Substance and Sexual Activity   • Alcohol use: No   • Drug use: No   • Sexual activity: Defer       Review of Systems   Constitution: Negative for fever and night sweats.   HENT: Negative for ear pain and stridor.    Eyes: Negative for discharge and visual halos.   Cardiovascular: Negative for cyanosis.   Respiratory: Negative for hemoptysis and sputum production.    Hematologic/Lymphatic: Negative for adenopathy.   Skin: Negative for nail changes and unusual hair distribution.   Musculoskeletal: Negative for gout and joint swelling.   Gastrointestinal: Negative for bowel incontinence and flatus.   Genitourinary: Negative for dysuria and flank pain.   Neurological: Negative for seizures and tremors.   Psychiatric/Behavioral: Negative for altered mental status. The patient is not nervous/anxious.             Objective:     Vitals:    20 1315   Weight: 82.6 kg (182 lb)   Height: 165.1 cm (65\")     Virtual visit     Lab Review:       Assessment:          Diagnosis Plan   1. Ventricular tachycardia (CMS/HCC)     2. Cardiomyopathy, nonischemic (CMS/HCC)            Plan:     I will have her do a download when she gets back.     She is doing fine.     Otherwise continue the same regimen.     She had a lung nodule noted on Chest CT -- I ordered another in 10 months.         "

## 2020-03-28 ENCOUNTER — CLINICAL SUPPORT NO REQUIREMENTS (OUTPATIENT)
Dept: CARDIOLOGY | Facility: CLINIC | Age: 50
End: 2020-03-28

## 2020-03-28 DIAGNOSIS — I42.9 CARDIOMYOPATHY, UNSPECIFIED TYPE (HCC): Primary | ICD-10-CM

## 2020-03-30 ENCOUNTER — TELEPHONE (OUTPATIENT)
Dept: CARDIOLOGY | Facility: CLINIC | Age: 50
End: 2020-03-30

## 2020-03-30 NOTE — TELEPHONE ENCOUNTER
pt. initiated remote on 3/28/20.  presenting egm, sinus AS/biv sense @ 77bpm.  0% paced.  Since last remote on 3/1/20, 3 nonsustained VT, 2 on 3/18/20 @ 19:29 and 19:30, 17 and 32 sec.,  no stored egms  and 1 on 3/27/20 @ 15:39, 12 sec. @ 164bpm.  Battery=7.5 years.  Normal lead trending.  I called pt. and she said Dr. Yancey ordered this transmission. Thanks. am

## 2020-04-17 ENCOUNTER — CLINICAL SUPPORT NO REQUIREMENTS (OUTPATIENT)
Dept: CARDIOLOGY | Facility: CLINIC | Age: 50
End: 2020-04-17

## 2020-04-17 DIAGNOSIS — I42.8 NICM (NONISCHEMIC CARDIOMYOPATHY) (HCC): Primary | ICD-10-CM

## 2020-04-17 PROCEDURE — 93296 REM INTERROG EVL PM/IDS: CPT | Performed by: INTERNAL MEDICINE

## 2020-04-17 PROCEDURE — 93295 DEV INTERROG REMOTE 1/2/MLT: CPT | Performed by: INTERNAL MEDICINE

## 2020-05-13 ENCOUNTER — TELEMEDICINE (OUTPATIENT)
Dept: FAMILY MEDICINE CLINIC | Facility: TELEHEALTH | Age: 50
End: 2020-05-13

## 2020-05-13 DIAGNOSIS — R11.0 NAUSEA: ICD-10-CM

## 2020-05-13 DIAGNOSIS — J02.9 PHARYNGITIS, UNSPECIFIED ETIOLOGY: Primary | ICD-10-CM

## 2020-05-13 PROCEDURE — 99213 OFFICE O/P EST LOW 20 MIN: CPT

## 2020-05-13 RX ORDER — AMOXICILLIN 875 MG/1
875 TABLET, COATED ORAL EVERY 12 HOURS
Qty: 20 TABLET | Refills: 0 | Status: SHIPPED | OUTPATIENT
Start: 2020-05-13 | End: 2020-05-23

## 2020-05-13 RX ORDER — ONDANSETRON 8 MG/1
8 TABLET, ORALLY DISINTEGRATING ORAL EVERY 8 HOURS PRN
Qty: 9 TABLET | Refills: 0 | Status: SHIPPED | OUTPATIENT
Start: 2020-05-13 | End: 2020-05-16

## 2020-05-13 NOTE — PATIENT INSTRUCTIONS
CORONAVIRUS (COVID-19) SYMPTOMS CAN MIMIC THE FLU, AND SOMETIMES THE COMMON COLD.  We cannot rule out infection or exposure without additional testing and treatment.  IF YOU ARE CONCERNED ABOUT INFECTION OR EXPOSURE TO CORONAVIRUS, please call your state's Health Department (contact information below) if you experience symptoms such as fever, cough and shortness of breath AND have a recent history of travel or contact with someone suspected of having COVID-19 please contact your state's health department.   [place] Department of Health ([facility]) Epidemiology Resource Center at [phone] (after hours and weekends call [phone])   [place] [facility] ([facility]) Epidemiology Resource Center at  [phone]     ** ANY CONDITION CAN CHANGE, despite proper treatment.  ** IF YOUR SYMPTOMS WORSEN, CHANGE, or PERSIST,   then get to the nearest ER, call your doctor, or urgent care clinic.  ** FOLLOW-UP CARE IS YOUR RESPONSIBILITY;    Urgent Care is NOT a substitute for your doctor.    ** ALWAYS FOLLOW-UP WITH YOUR OWN DOCTOR   to review this [facility] visit, and for possible repeat blood or urine tests, x-rays, or additional testing.    ** ALL MEDICATIONS HAVE SIDE EFFECTS.    Please review these, and ask your pharmacist or contact your primary care provider for questions or concerns.     Take any prescribed medication as directed.  Rest  Hydrate  Tylenol (acetaminophen) as needed for pain/fever every 4-6 hours.  Avoid contact with infants, elderly, pregnant women, and immunocompromised.   Home quarantine as directed.   At least 3 days (72 hours) have passed since recovery defined as resolution of fever  without the use of fever-reducing medications AND   improvement in respiratory symptoms (e.g., cough, shortness of breath); AND,   At least 10 days have passed since symptoms first appeared.        Pharyngitis    Pharyngitis is a sore throat (pharynx). This is when there is redness, pain, and swelling in  your throat. Most of the time, this condition gets better on its own. In some cases, you may need medicine.  Follow these instructions at home:  · Take over-the-counter and prescription medicines only as told by your doctor.  ? If you were prescribed an antibiotic medicine, take it as told by your doctor. Do not stop taking the antibiotic even if you start to feel better.  ? Do not give children aspirin. Aspirin has been linked to Reye syndrome.  · Drink enough water and fluids to keep your pee (urine) clear or pale yellow.  · Get a lot of rest.  · Rinse your mouth (gargle) with a salt-water mixture 3-4 times a day or as needed. To make a salt-water mixture, completely dissolve ½-1 tsp of salt in 1 cup of warm water.  · If your doctor approves, you may use throat lozenges or sprays to soothe your throat.  Contact a doctor if:  · You have large, tender lumps in your neck.  · You have a rash.  · You cough up green, yellow-brown, or bloody spit.  Get help right away if:  · You have a stiff neck.  · You drool or cannot swallow liquids.  · You cannot drink or take medicines without throwing up.  · You have very bad pain that does not go away with medicine.  · You have problems breathing, and it is not from a stuffy nose.  · You have new pain and swelling in your knees, ankles, wrists, or elbows.  Summary  · Pharyngitis is a sore throat (pharynx). This is when there is redness, pain, and swelling in your throat.  · If you were prescribed an antibiotic medicine, take it as told by your doctor. Do not stop taking the antibiotic even if you start to feel better.  · Most of the time, pharyngitis gets better on its own. Sometimes, you may need medicine.  This information is not intended to replace advice given to you by your health care provider. Make sure you discuss any questions you have with your health care provider.  Document Released: 06/05/2009 Document Revised: 01/23/2018 Document Reviewed: 01/23/2018  [employer]  Interactive Patient Education © 2020 [employer]    Nausea, Adult  Nausea is feeling sick to your stomach or feeling that you are about to throw up (vomit). Feeling sick to your stomach is usually not serious, but it may be an early sign of a more serious medical problem.  As you feel sicker to your stomach, you may throw up. If you throw up, or if you are not able to drink enough fluids, there is a risk that you may lose too much water in your body (get dehydrated). If you lose too much water in your body, you may:  · Feel tired.  · Feel thirsty.  · Have a dry mouth.  · Have cracked lips.  · Go pee (urinate) less often.  Older adults and people who have other diseases or a weak body defense system (immune system) have a higher risk of losing too much water in the body.  The main goals of treating this condition are:  · To relieve your nausea.  · To ensure your nausea occurs less often.  · To prevent throwing up and losing too much fluid.  Follow these instructions at home:  Watch your symptoms for any changes. Tell your doctor about them. Follow these instructions as told by your doctor.  Eating and drinking         · Take an ORS (oral rehydration solution). This is a drink that is sold at pharmacies and stores.  · Drink clear fluids in small amounts as you are able. These include:  ? Water.  ? Ice chips.  ? Fruit juice that has water added (diluted fruit juice).  ? Low-calorie sports drinks.  · Eat bland, easy-to-digest foods in small amounts as you are able, such as:  ? Bananas.  ? Applesauce.  ? Rice.  ? Low-fat (lean) meats.  ? Toast.  ? Crackers.  · Avoid drinking fluids that have a lot of sugar or caffeine in them. This includes energy drinks, sports drinks, and soda.  · Avoid alcohol.  · Avoid spicy or fatty foods.  General instructions  · Take over-the-counter and prescription medicines only as told by your doctor.  · Rest at home while you get better.  · Drink enough fluid to keep your pee (urine) pale  yellow.  · Take slow and deep breaths when you feel sick to your stomach.  · Avoid food or things that have strong smells.  · Wash your hands often with soap and water. If you cannot use soap and water, use hand .  · Make sure that all people in your home wash their hands well and often.  · Keep all follow-up visits as told by your doctor. This is important.  Contact a doctor if:  · You feel sicker to your stomach.  · You feel sick to your stomach for more than 2 days.  · You throw up.  · You are not able to drink fluids without throwing up.  · You have new symptoms.  · You have a fever.  · You have a headache.  · You have muscle cramps.  · You have a rash.  · You have pain while peeing.  · You feel light-headed or dizzy.  Get help right away if:  · You have pain in your chest, neck, arm, or jaw.  · You feel very weak or you pass out (faint).  · You have throw up that is bright red or looks like coffee grounds.  · You have bloody or black poop (stools) or poop that looks like tar.  · You have a very bad headache, a stiff neck, or both.  · You have very bad pain, cramping, or bloating in your belly (abdomen).  · You have trouble breathing or you are breathing very quickly.  · Your heart is beating very quickly.  · Your skin feels cold and clammy.  · You feel confused.  · You have signs of losing too much water in your body, such as:  ? Dark pee, very little pee, or no pee.  ? Cracked lips.  ? Dry mouth.  ? Sunken eyes.  ? Sleepiness.  ? Weakness.  These symptoms may be an emergency. Do not wait to see if the symptoms will go away. Get medical help right away. Call your local emergency services (911 in the [place] Do not drive yourself to the hospital.  Summary  · Nausea is feeling sick to your stomach or feeling that you are about to throw up (vomit).  · If you throw up, or if you are not able to drink enough fluids, there is a risk that you may lose too much water in your body (get dehydrated).  · Eat and  drink what your doctor tells you. Take over-the-counter and prescription medicines only as told by your doctor.  · Contact a doctor right away if your symptoms get worse or you have new symptoms.  · Keep all follow-up visits as told by your doctor. This is important.  This information is not intended to replace advice given to you by your health care provider. Make sure you discuss any questions you have with your health care provider.  Document Released: 12/06/2012 Document Revised: 05/28/2019 Document Reviewed: 05/28/2019  [employer] Patient Education © 2020 [employer]  How to Quarantine at Home  Information for Patients and Families    These instructions are for people with confirmed or suspected COVID-19 who do not need to be hospitalized and those with confirmed COVID-19 who were hospitalized and discharged to care for themselves at home.    If you were tested through the Health Department  The Health Department will monitor your wellbeing.  If it is determined that you do not need to be hospitalized and can be isolated at home, you will be monitored by staff from your local or state health department.     If you were tested through a Commercial Lab  You will need to monitor yourself and report changes in your symptoms to your doctor.  See the section below called Monitor Your Symptoms.    Follow these steps until a healthcare provider or local or state health department says you can return to your normal activities.    Stay home except to get medical care  • Restrict activities outside your home, except for getting medical care.   • Do not go to work, school, or public areas.   • Avoid using public transportation, ride-sharing, or taxis.    Separate yourself from other people and animals in your home  People  As much as possible, you should stay in a specific room and away from other people in your home. Also, you should use a separate bathroom, if available.    Animals  You should restrict  contact with pets and other animals while you are sick with COVID-19, just like you would around other people. When possible, have another member of your household care for your animals while you are sick. If you are sick with COVID-19, avoid contact with your pet, including petting, snuggling, being kissed or licked, and sharing food. If you must care for your pet or be around animals while you are sick, wash your hands before and after you interact with pets and wear a facemask. See COVID-19 and Animals for more information.    Call ahead before visiting your doctor  If you have a medical appointment, call the healthcare provider and tell them that you have or may have COVID-19. This information will help the healthcare provider’s office take steps to keep other people from getting infected or exposed.    Wear a facemask  You should wear a facemask when you are around other people (e.g., sharing a room or vehicle) or pets and before you enter a healthcare provider’s office.     If you are not able to wear a facemask (for example, because it causes trouble breathing), then people who live with you should not stay in the same room with you, or they should wear a facemask if they enter your room.    Cover your coughs and sneezes  • Cover your mouth and nose with a tissue when you cough or sneeze.   • Throw used tissues in a lined trash can.   • Immediately wash your hands with soap and water for at least 20 seconds or, if soap and water are not available, clean your hands with an alcohol-based hand  that contains at least 60% alcohol.    Clean your hands often  • Wash your hands often with soap and water for at least 20 seconds, especially after blowing your nose, coughing, or sneezing; going to the bathroom; and before eating or preparing food.     • If soap and water are not readily available, use an alcohol-based hand  with at least 60% alcohol, covering all surfaces of your hands and rubbing  them together until they feel dry.    • Soap and water are the best option if hands are visibly dirty. Avoid touching your eyes, nose, and mouth with unwashed hands.    Avoid sharing personal household items  • You should not share dishes, drinking glasses, cups, eating utensils, towels, or bedding with other people or pets in your home.   • After using these items, they should be washed thoroughly with soap and water.    Clean all “high-touch” surfaces everyday  • High touch surfaces include counters, tabletops, doorknobs, bathroom fixtures, toilets, phones, keyboards, tablets, and bedside tables.   • Also, clean any surfaces that may have blood, stool, or body fluids on them.   • Use a household cleaning spray or wipe, according to the label instructions. Labels contain instructions for safe and effective use of the cleaning product, including precautions you should take when applying the product, such as wearing gloves and making sure you have good ventilation during use of the product.    Monitor your symptoms  • Seek prompt medical attention if your illness is worsening (e.g., difficulty breathing).   • Before seeking care, call your healthcare provider and tell them that you have, or are being evaluated for, COVID-19.   • Put on a facemask before you enter the facility.     • These steps will help the healthcare provider’s office to keep other people in the office or waiting room from getting infected or exposed.   • Persons who are placed under active monitoring or facilitated self-monitoring should follow instructions provided by their local health department or occupational health professionals, as appropriate.  • If you have a medical emergency and need to call 911, notify the dispatch personnel that you have, or are being evaluated for COVID-19. If possible, put on a facemask before emergency medical services arrive.    Discontinuing home isolation  Patients with confirmed COVID-19 should remain under  home isolation precautions until the risk of secondary transmission to others is thought to be low. The decision to discontinue home isolation precautions should be made on a case-by-case basis, in consultation with healthcare providers and state and local health departments.    The below content are for household members, intimate partners, and caregivers of a patient with symptomatic laboratory-confirmed COVID-19 or a patient under investigation:    Household members, intimate partners, and caregivers may have close contact with a person with symptomatic, laboratory-confirmed COVID-19 or a person under investigation.     Close contacts should monitor their health; they should call their healthcare provider right away if they develop symptoms suggestive of COVID-19 (e.g., fever, cough, shortness of breath)     Close contacts should also follow these recommendations:  • Make sure that you understand and can help the patient follow their healthcare provider’s instructions for medication(s) and care. You should help the patient with basic needs in the home and provide support for getting groceries, prescriptions, and other personal needs.  • Monitor the patient’s symptoms. If the patient is getting sicker, call his or her healthcare provider and tell them that the patient has laboratory-confirmed COVID-19. This will help the healthcare provider’s office take steps to keep other people in the office or waiting room from getting infected. Ask the healthcare provider to call the local or state health department for additional guidance. If the patient has a medical emergency and you need to call 911, notify the dispatch personnel that the patient has, or is being evaluated for COVID-19.  • Household members should stay in another room or be  from the patient as much as possible. Household members should use a separate bedroom and bathroom, if available.  • Prohibit visitors who do not have an essential need to be  in the home.  • Household members should care for any pets in the home. Do not handle pets or other animals while sick.  For more information, see COVID-19 and Animals.  • Make sure that shared spaces in the home have good air flow, such as by an air conditioner or an opened window, weather permitting.  • Perform hand hygiene frequently. Wash your hands often with soap and water for at least 20 seconds or use an alcohol-based hand  that contains 60 to 95% alcohol, covering all surfaces of your hands and rubbing them together until they feel dry. Soap and water should be used preferentially if hands are visibly dirty.  • Avoid touching your eyes, nose, and mouth with unwashed hands.  • The patient should wear a facemask when you are around other people. If the patient is not able to wear a facemask (for example, because it causes trouble breathing), you, as the caregiver, should wear a mask when you are in the same room as the patient.  • Wear a disposable facemask and gloves when you touch or have contact with the patient’s blood, stool, or body fluids, such as saliva, sputum, nasal mucus, vomit, or urine.   o Throw out disposable facemasks and gloves after using them. Do not reuse.  o When removing personal protective equipment, first remove and dispose of gloves. Then, immediately clean your hands with soap and water or alcohol-based hand . Next, remove and dispose of facemask, and immediately clean your hands again with soap and water or alcohol-based hand .  • Avoid sharing household items with the patient. You should not share dishes, drinking glasses, cups, eating utensils, towels, bedding, or other items. After the patient uses these items, you should wash them thoroughly (see below “Wash laundry thoroughly”).  • Clean all “high-touch” surfaces, such as counters, tabletops, doorknobs, bathroom fixtures, toilets, phones, keyboards, tablets, and bedside tables, every day. Also, clean  any surfaces that may have blood, stool, or body fluids on them.   o Use a household cleaning spray or wipe, according to the label instructions. Labels contain instructions for safe and effective use of the cleaning product including precautions you should take when applying the product, such as wearing gloves and making sure you have good ventilation during use of the product.  • Wash laundry thoroughly.   o Immediately remove and wash clothes or bedding that have blood, stool, or body fluids on them.  o Wear disposable gloves while handling soiled items and keep soiled items away from your body. Clean your hands (with soap and water or an alcohol-based hand ) immediately after removing your gloves.  o Read and follow directions on labels of laundry or clothing items and detergent. In general, using a normal laundry detergent according to washing machine instructions and dry thoroughly using the warmest temperatures recommended on the clothing label.  • Place all used disposable gloves, facemasks, and other contaminated items in a lined container before disposing of them with other household waste. Clean your hands (with soap and water or an alcohol-based hand ) immediately after handling these items. Soap and water should be used preferentially if hands are visibly dirty.  • Discuss any additional questions with your state or local health department or healthcare provider.    Adapted from information provided by the Centers for Disease Control and Prevention.  For more information, visit https://www.cdc.gov/coronavirus/2019-ncov/hcp/guidance-prevent-spread.html

## 2020-05-13 NOTE — PROGRESS NOTES
CHIEF COMPLAINT  Chief Complaint   Patient presents with   • Nausea   • Sore Throat     HPI  Tiffani Fiore is a 49 y.o. female  presents with complaint of nausea, dry heaving, chills, and sore throat x 4-5 days. She has not tried any treatment. Pt denies myalgias, abdominal pain, hematemesis, diarrhea, GERD sxs, fever, blood in stool, rash, sinus congestion, cough, SOA, loss of sense of taste or smell. She has some mild chills but states that her temperature has never been elevated when she checked it. Pt works in a "[employer] center. She has been able to hydrate and hold fluids and some food down.    Review of Systems   Constitutional: Positive for appetite change, chills and fatigue. Negative for activity change, diaphoresis and fever.   HENT: Positive for sore throat and trouble swallowing. Negative for congestion, dental problem, ear pain, postnasal drip, rhinorrhea, sinus pressure, sinus pain, sneezing, tinnitus and voice change.    Eyes: Negative.    Respiratory: Negative.    Cardiovascular: Negative.    Gastrointestinal: Positive for nausea and vomiting. Negative for abdominal distention, abdominal pain, blood in stool, constipation, diarrhea and rectal pain.   Genitourinary: Negative for decreased urine volume.   Musculoskeletal: Negative for back pain, gait problem, joint swelling, myalgias, neck pain and neck stiffness.   Skin: Negative.    Neurological: Negative.    Hematological: Negative for adenopathy.   Psychiatric/Behavioral: Negative for confusion.       Past Medical History:   Diagnosis Date   • Cardiomyopathy (CMS/[facility]) 2012   • Chest pain    • CHF (congestive heart failure) (CMS/[facility])    • Clostridium difficile colitis 2013   • Depression    • DVT (deep venous thrombosis) (CMS/[facility]) 2012   • Dyspnea    • Fatigue    • History of paroxysmal atrial tachycardia    • History of transfusion    • Hyperlipidemia    • Hypertension    • ICD (implantable cardioverter-defibrillator) in place     LEFT  SUBCLAVIAN AREA   • Migraine    • Myocardial infarction (CMS/[facility])    • Obesity due to excess calories    • Palpitations     ICD   • PONV (postoperative nausea and vomiting)    • Pulmonary embolism (CMS/[facility])    • S/P ICD (internal cardiac defibrillator) procedure    • SVT (supraventricular tachycardia) (CMS/[facility])    • Tachycardia        Family History   Problem Relation Age of Onset   • Diabetes Father    • Cancer Father         lung   • COPD Mother    • Malig Hyperthermia Neg Hx        Social History     Socioeconomic History   • Marital status: Single     Spouse name: Not on file   • Number of children: Not on file   • Years of education: Not on file   • Highest education level: Not on file   Tobacco Use   • Smoking status: Former Smoker     Packs/day: 0.50     Years: 15.00     Pack years: 7.50     Types: Cigarettes     Last attempt to quit: 2012     Years since quittin.3   • Smokeless tobacco: Former User   • Tobacco comment: quit age 42   Substance and Sexual Activity   • Alcohol use: No   • Drug use: No   • Sexual activity: Defer         LMP 1997     PHYSICAL EXAM  Physical Exam   Constitutional: She appears well-developed and well-nourished. No distress.   HENT:   Head: Normocephalic and atraumatic.   Pharyngeal erythema, tonsils enlarged bilaterally   Eyes: No scleral icterus.   Pupils equal and round   Neck: Neck normal appearance.  Pulmonary/Chest: Effort normal. No stridor.  No respiratory distress. She no audible wheeze...  Abdominal: Abdomen appears normal. Soft. She exhibits no distension. There is no tenderness.   Lymphadenopathy:     She has no cervical adenopathy.   Neurological: She is alert.   oriented   Skin: Skin is warm and dry. No rash noted.   Psychiatric: She has a normal mood and affect.          Tiffani was seen today for nausea and sore throat.    Diagnoses and all orders for this visit:    Pharyngitis, unspecified etiology  -     amoxicillin (AMOXIL) 875 MG tablet; Take 1  tablet by mouth Every 12 (Twelve) Hours for 10 days.  -     QUESTIONNAIRE SERIES    Nausea  -     ondansetron ODT (ZOFRAN-ODT) 8 MG disintegrating tablet; Place 1 tablet on the tongue Every 8 (Eight) Hours As Needed for Nausea or Vomiting for up to 3 days.          FOLLOW-UP  Follow up with your primary health care provider if no improvement or Urgent Care/Emergency Department if worsening of symptoms    Patient verbalizes understanding of medication dosage, comfort measures, instructions for treatment and follow-up.    Sola Capps PA-C  05/13/2020  8:11 AM  Total time 20 minutes    This visit was performed via Telehealth.  This patient has been instructed to follow-up with their primary care provider if their symptoms worsen or the treatment provided does not resolve their illness.

## 2020-06-21 ENCOUNTER — TELEMEDICINE (OUTPATIENT)
Dept: FAMILY MEDICINE CLINIC | Facility: TELEHEALTH | Age: 50
End: 2020-06-21

## 2020-06-21 DIAGNOSIS — R39.89 SUSPECTED UTI: Primary | ICD-10-CM

## 2020-06-21 PROCEDURE — 99213 OFFICE O/P EST LOW 20 MIN: CPT | Performed by: PHYSICIAN ASSISTANT

## 2020-06-21 RX ORDER — NITROFURANTOIN 25; 75 MG/1; MG/1
100 CAPSULE ORAL 2 TIMES DAILY
Qty: 10 CAPSULE | Refills: 0 | Status: SHIPPED | OUTPATIENT
Start: 2020-06-21 | End: 2020-06-26

## 2020-06-21 RX ORDER — PHENAZOPYRIDINE HYDROCHLORIDE 100 MG/1
100 TABLET, FILM COATED ORAL 3 TIMES DAILY PRN
Qty: 6 TABLET | Refills: 0 | Status: SHIPPED | OUTPATIENT
Start: 2020-06-21 | End: 2020-06-23

## 2020-06-21 NOTE — PATIENT INSTRUCTIONS
Encourage fluids.  Take medications as directed.  Go to the ER if symptoms worsen; abdominal pain or back increase, vomiting develops, or fever develops.  Go to the Urgent care in 2 days if symptoms have not improved.      Urinary Tract Infection, Adult  A urinary tract infection (UTI) is an infection of any part of the urinary tract. The urinary tract includes:  · The kidneys.  · The ureters.  · The bladder.  · The urethra.  These organs make, store, and get rid of pee (urine) in the body.  What are the causes?  This is caused by germs (bacteria) in your genital area. These germs grow and cause swelling (inflammation) of your urinary tract.  What increases the risk?  You are more likely to develop this condition if:  · You have a small, thin tube (catheter) to drain pee.  · You cannot control when you pee or poop (incontinence).  · You are female, and:  ? You use these methods to prevent pregnancy:  ? A medicine that kills sperm (spermicide).  ? A device that blocks sperm (diaphragm).  ? You have low levels of a female hormone (estrogen).  ? You are pregnant.  · You have genes that add to your risk.  · You are sexually active.  · You take antibiotic medicines.  · You have trouble peeing because of:  ? A prostate that is bigger than normal, if you are male.  ? A blockage in the part of your body that drains pee from the bladder (urethra).  ? A kidney stone.  ? A nerve condition that affects your bladder (neurogenic bladder).  ? Not getting enough to drink.  ? Not peeing often enough.  · You have other conditions, such as:  ? Diabetes.  ? A weak disease-fighting system (immune system).  ? Sickle cell disease.  ? Gout.  ? Injury of the spine.  What are the signs or symptoms?  Symptoms of this condition include:  · Needing to pee right away (urgently).  · Peeing often.  · Peeing small amounts often.  · Pain or burning when peeing.  · Blood in the pee.  · Pee that smells bad or not like normal.  · Trouble peeing.  · Pee  that is cloudy.  · Fluid coming from the vagina, if you are female.  · Pain in the belly or lower back.  Other symptoms include:  · Throwing up (vomiting).  · No urge to eat.  · Feeling mixed up (confused).  · Being tired and grouchy (irritable).  · A fever.  · Watery poop (diarrhea).  How is this treated?  This condition may be treated with:  · Antibiotic medicine.  · Other medicines.  · Drinking enough water.  Follow these instructions at home:    Medicines  · Take over-the-counter and prescription medicines only as told by your doctor.  · If you were prescribed an antibiotic medicine, take it as told by your doctor. Do not stop taking it even if you start to feel better.  General instructions  · Make sure you:  ? Pee until your bladder is empty.  ? Do not hold pee for a long time.  ? Empty your bladder after sex.  ? Wipe from front to back after pooping if you are a female. Use each tissue one time when you wipe.  · Drink enough fluid to keep your pee pale yellow.  · Keep all follow-up visits as told by your doctor. This is important.  Contact a doctor if:  · You do not get better after 1-2 days.  · Your symptoms go away and then come back.  Get help right away if:  · You have very bad back pain.  · You have very bad pain in your lower belly.  · You have a fever.  · You are sick to your stomach (nauseous).  · You are throwing up.  Summary  · A urinary tract infection (UTI) is an infection of any part of the urinary tract.  · This condition is caused by germs in your genital area.  · There are many risk factors for a UTI. These include having a small, thin tube to drain pee and not being able to control when you pee or poop.  · Treatment includes antibiotic medicines for germs.  · Drink enough fluid to keep your pee pale yellow.  This information is not intended to replace advice given to you by your health care provider. Make sure you discuss any questions you have with your health care provider.  Document  Released: 06/05/2009 Document Revised: 12/05/2019 Document Reviewed: 06/27/2019  Elsevier Patient Education © 2020 [employer] Inc[employer]  .

## 2020-06-21 NOTE — PROGRESS NOTES
"Tiffani VIVEROS Adebayo    1970  [id number]    I have reviewed the e-Visit questionnaire and patient's answers via telephone as patient could not connect camera.  My assessment and plan are as follows:    CC; Increased urinary frequency     HISTORY OF PRESENT ILLNESS  HPI   Pt is a 49 yr old f who presents via telephone with c/o increased urinary frequency x 2 days.  Associated with dysuria,  Increased urinary urgency, nausea, hematuria, constant dull low back ache, and suprapubic abdominal pain that she describes as constant \"aching\".  Pt denies fever and vomiting.  States last UTI was several years ago and these are similar symptoms.  Pt took a home UTI test and states was positive for nitrites and leukocytes.    Review of Systems   Constitutional: Negative for fever.   Gastrointestinal: Positive for abdominal pain and nausea. Negative for vomiting.   Genitourinary: Positive for dysuria, frequency, hematuria and urgency.   Musculoskeletal: Positive for back pain.          Physical Exam     No PE performed.    Diagnoses and all orders for this visit:    Suspected UTI  -     nitrofurantoin, macrocrystal-monohydrate, (Macrobid) 100 MG capsule; Take 1 capsule by mouth 2 (Two) Times a Day for 5 days.  -     phenazopyridine (Pyridium) 100 MG tablet; Take 1 tablet by mouth 3 (Three) Times a Day As Needed for Bladder Spasms for up to 2 days.        Any medications prescribed have been sent electronically to   [facility] [facility] - [phone]  - [phone]   [address] [place], [place] [address]  Phone: [phone] Fax: [phone]    Encourage fluids.  Take medications as directed.  Go to the ER if symptoms worsen; abdominal pain or back increase, vomiting develops, or fever develops.  Go to the Urgent care in 2 days if symptoms have not improved.    Telephone 20 mins    Tricia Domínguez PA-C  06/21/20  5:29 PM  "

## 2020-08-17 NOTE — PAYOR COMM NOTE
"Tiffani Lopez (47 y.o. Female)                                          NOTIFICATION ONLY IS REQUIRED FOR ADMISSION                                                   FACE SHEET ATTACHED                                                              CONTACT=   ALMAZ AVELAR                                                                   FAX   [phone]                                                                          [phone]        Date of Birth Social Security Number Address Home Phone MRN    1970  [id number]  [facility] [address] [phone] [id number]    Mu-ism Marital Status          None Single       Admission Date Admission Type Admitting Provider Attending Provider Department, Room/Bed    9/30/17 Emergency Genesis Weinberg MD Lash, Jennifer A, MD [address], 465/1    Discharge Date Discharge Disposition Discharge Destination                      Attending Provider: Genesis Weinberg MD     Allergies:  No Known Allergies    Isolation:  None   Infection:  None   Code Status:  FULL    Ht:  65.98\" (167.6 cm)   Wt:  186 lb 1.6 oz (84.4 kg)    Admission Cmt:  None   Principal Problem:  None                Active Insurance as of 9/30/2017     Primary Coverage     Payor Plan Insurance Group Employer/Plan Group    [employer]HEM BLUE CROSS [employer]M BLUE [employer]OSS BLUE SHIELD PPO [id number]     Payor Plan Address Payor Plan Phone Number Effective From Effective To    [address] [phone] 1/1/2016     [place] [address]       Subscriber Name Subscriber Birth Date Member ID       TIFFANI LOPEZ 1970 [id number]                 Emergency Contacts      (Rel.) Home Phone Work Phone Mobile Phone    Chidi Morrell (Significant Other) [phone] -- [phone]            Insurance Information                [employer]HE[employer] BLUE CROSS/[employer]HE[employer] BLUE [employer]OSS BLUE SHIELD PPO Phone: [phone]    Subscriber: Tiffani Lopez Subscriber#: [id number]    " Patient reported she has been referred to Dr. Hopper and is in the process of setting up angioplasty balloon with stenting for her right carotid stenosis (80% blocked). Patient is very concerned and feels she is a ticking time bomb. She has always taken her benazepril at night but is asking should she take it in the morning;  Recent blood pressures at home have been,  95/43 (after working hard in the sun with ),  137/62 with P 73 at 8:00pm,  131/56 with P 69 at 10:30am,  136/66 with P69 at 5:00 pm,  124/55 at 8:00pm,  130/61 with P 64 at 9:30am.  Patient is also giving a heads up she will need a pre-op exam for her angioplasty balloon procedure.  Please advise, should she change her time of medication, any thing she can do to help reduce the chances of stroke or heart attack.   Group#: [id number] Precert#:

## 2020-10-19 RX ORDER — LISINOPRIL 20 MG/1
TABLET ORAL
Qty: 90 TABLET | Refills: 0 | Status: SHIPPED | OUTPATIENT
Start: 2020-10-19 | End: 2021-03-31

## 2020-10-19 RX ORDER — CARVEDILOL 6.25 MG/1
TABLET ORAL
Qty: 180 TABLET | Refills: 0 | Status: SHIPPED | OUTPATIENT
Start: 2020-10-19 | End: 2021-03-31

## 2020-10-28 ENCOUNTER — CLINICAL SUPPORT NO REQUIREMENTS (OUTPATIENT)
Dept: CARDIOLOGY | Facility: CLINIC | Age: 50
End: 2020-10-28

## 2020-10-28 ENCOUNTER — OFFICE VISIT (OUTPATIENT)
Dept: CARDIOLOGY | Facility: CLINIC | Age: 50
End: 2020-10-28

## 2020-10-28 VITALS
HEIGHT: 65 IN | WEIGHT: 186 LBS | SYSTOLIC BLOOD PRESSURE: 122 MMHG | HEART RATE: 72 BPM | DIASTOLIC BLOOD PRESSURE: 70 MMHG | BODY MASS INDEX: 30.99 KG/M2

## 2020-10-28 DIAGNOSIS — Z95.810 S/P ICD (INTERNAL CARDIAC DEFIBRILLATOR) PROCEDURE: ICD-10-CM

## 2020-10-28 DIAGNOSIS — E66.3 OVERWEIGHT (BMI 25.0-29.9): ICD-10-CM

## 2020-10-28 DIAGNOSIS — I42.8 NICM (NONISCHEMIC CARDIOMYOPATHY) (HCC): Primary | ICD-10-CM

## 2020-10-28 DIAGNOSIS — I47.20 VENTRICULAR TACHYCARDIA (HCC): ICD-10-CM

## 2020-10-28 DIAGNOSIS — I42.8 CARDIOMYOPATHY, NONISCHEMIC (HCC): Primary | ICD-10-CM

## 2020-10-28 PROCEDURE — 99213 OFFICE O/P EST LOW 20 MIN: CPT | Performed by: NURSE PRACTITIONER

## 2020-10-28 PROCEDURE — 93000 ELECTROCARDIOGRAM COMPLETE: CPT | Performed by: NURSE PRACTITIONER

## 2020-10-28 PROCEDURE — 93282 PRGRMG EVAL IMPLANTABLE DFB: CPT | Performed by: INTERNAL MEDICINE

## 2020-10-28 NOTE — PROGRESS NOTES
Date of Office Visit: 10/28/2020  Encounter Provider: WILD Lyle  Place of Service: [facility] CARDIOLOGY  Patient Name: Tiffani Fiore  :1970    Chief Complaint   Patient presents with   • Cardiomyopathy     6 month f/u   • ventricular tachycardia   :     HPI: Tiffani Fiore is a 50 y.o. female who is followed by Dr. Yancey. She has NICM, VT, s/p VT ablation--, Dr. Levy. Recurrent VT 2020---ICD shock, decided to monitor. Has had some short non sustained runs since February.     Presents today for routine follow up.     She is doing well. No chest pain, dyspnea, PND, orthopnea, dizziness or edema.    She had a non sustained VT episode in Sept---16 beats in duration, she felt it---has some dizziness but resolved quickly--no episodes since that time.     Device shows normal testing and function.     CT of chest in Feb showed very small pul nodule---recommended repeat CT in 1 year---the order is in Epic for her to scheduled in January---gave her number to do that.         Past Medical History:   Diagnosis Date   • Cardiomyopathy (CMS/HCC)    • Chest pain    • CHF (congestive heart failure) (CMS/[facility])    • Clostridium difficile colitis    • Depression    • DVT (deep venous thrombosis) (CMS/HCC)    • Dyspnea    • Fatigue    • History of paroxysmal atrial tachycardia    • History of transfusion    • Hyperlipidemia    • Hypertension    • ICD (implantable cardioverter-defibrillator) in place     LEFT SUBCLAVIAN AREA   • Ischemic cardiomyopathy    • Migraine    • Myocardial infarction (CMS/HCC)    • Obesity due to excess calories    • Overweight (BMI 25.0-29.9)    • Palpitations     ICD   • PONV (postoperative nausea and vomiting)    • Pulmonary embolism (CMS/HCC)    • S/P ICD (internal cardiac defibrillator) procedure    • SVT (supraventricular tachycardia) (CMS/HCC)    • Tachycardia    • VT (ventricular tachycardia) (CMS/HCC)     recurrent        Past Surgical History:   Procedure Laterality Date   • ABLATION OF DYSRHYTHMIC FOCUS  2018    Multiple VT ablations, last on at -Dr Levy, epicardial VT 2018   • BRONCHOSCOPY N/A 3/8/2018    Procedure: BRONCHOSCOPY WITH BAL;  Surgeon: Berto Nicholson MD;  Location: [facility] ENDOSCOPY;  Service:    • CARDIAC CATHETERIZATION N/A 2017    Procedure: Left Heart Cath;  Surgeon: Saúl Ann MD;  Location: [facility] INVASIVE LOCATION;  Service:    • CARDIAC CATHETERIZATION N/A 2017    Procedure: Left ventriculography;  Surgeon: Saúl Ann MD;  Location: [facility] INVASIVE LOCATION;  Service:    • CARDIAC CATHETERIZATION N/A 2017    Procedure: Coronary angiography;  Surgeon: Saúl Ann MD;  Location: [facility] INVASIVE LOCATION;  Service:    • CARDIAC DEFIBRILLATOR PLACEMENT     • CARDIAC DEFIBRILLATOR PLACEMENT      CONNER DID LEAD REVISION WITH REPLACEMENT   • CARDIAC DEFIBRILLATOR PLACEMENT     • CARDIAC DEFIBRILLATOR PLACEMENT     • CARDIAC ELECTROPHYSIOLOGY PROCEDURE N/A 2017    Procedure: Ablation VT  PATIENT HAS BOSTON ICD;  Surgeon: Grayson Yancey MD;  Location: [facility] INVASIVE LOCATION;  Service:    •  SECTION     • HYSTERECTOMY     • IMPLANTABLE CARDIOVERTER DEFIBRILLATOR LEAD REPLACEMENT/POCKET REVISION N/A 10/20/2017    Procedure: ICD LEAD EXTRACTION TRANSVENOUS AND PLACEMENT OF RV AND LV LEAD;  Surgeon: Amrit Salgado MD;  Location: [facility] OR ;  Service:    • TUBAL ABDOMINAL LIGATION         Social History     Socioeconomic History   • Marital status: Single     Spouse name: Not on file   • Number of children: Not on file   • Years of education: Not on file   • Highest education level: Not on file   Tobacco Use   • Smoking status: Former Smoker     Packs/day: 0.50     Years: 15.00     Pack years: 7.50     Types: Cigarettes     Quit date:      Years since quittin.8   • Smokeless tobacco: Former User   • Tobacco  "comment: quit age 42   Substance and Sexual Activity   • Alcohol use: No   • Drug use: No   • Sexual activity: Defer       Family History   Problem Relation Age of Onset   • Diabetes Father    • Cancer Father         lung   • COPD Mother    • Malig Hyperthermia Neg Hx        Review of Systems   Constitution: Negative for chills, fever and malaise/fatigue.   Cardiovascular: Negative for chest pain, dyspnea on exertion, leg swelling, near-syncope, orthopnea, palpitations, paroxysmal nocturnal dyspnea and syncope.   Respiratory: Negative for cough and shortness of breath.    Musculoskeletal: Negative for joint pain, joint swelling and myalgias.   Gastrointestinal: Negative for abdominal pain, diarrhea, melena, nausea and vomiting.   Genitourinary: Negative for frequency and hematuria.   Neurological: Negative for light-headedness, numbness, paresthesias and seizures.   Allergic/Immunologic: Negative.    All other systems reviewed and are negative.      No Known Allergies      Current Outpatient Medications:   •  carvedilol (COREG) 6.25 MG tablet, TAKE TWO TABLETS BY MOUTH EVERY MORNING, Disp: 180 tablet, Rfl: 0  •  lisinopril (PRINIVIL,ZESTRIL) 20 MG tablet, TAKE ONE TABLET BY MOUTH DAILY, Disp: 90 tablet, Rfl: 0      Objective:     Vitals:    10/28/20 0821   BP: 122/70   Pulse: 72   Weight: 84.4 kg (186 lb)   Height: 165.1 cm (65\")     Body mass index is 30.95 kg/m².    PHYSICAL EXAM:    Vitals Reviewed.   General Appearance: No acute distress, well developed and well nourished.   Eyes: Conjunctiva and lids: No erythema, swelling, or discharge. Sclera non-icteric.   HENT: Atraumatic, normocephalic. External eyes, ears, and nose normal.   Respiratory: No signs of respiratory distress. Respiration rhythm and depth normal.   Clear to auscultation. No rales, crackles, rhonchi, or wheezing auscultated.   Cardiovascular:  Jugular Venous Pressure: Normal  Heart Rate and Rhythm: Normal, Heart Sounds: Normal S1 and S2. No S3 " or S4 noted.  Murmurs: No murmurs noted. No rubs, thrills, or gallops.   Arterial Pulses:  Posterior tibialis and dorsalis pedis pulses normal.   Lower Extremities: No edema noted.  Gastrointestinal:  Abdomen soft, non-distended, non-tender.   Musculoskeletal: Normal movement of extremities  Skin and Nails: General appearance normal. No pallor, cyanosis, diaphoresis. Skin temperature normal. No clubbing of fingernails.   Psychiatric: Patient alert and oriented to person, place, and time. Speech and behavior appropriate. Normal mood and affect.       ECG 12 Lead    Date/Time: 10/28/2020 8:44 AM  Performed by: Maci Menchaca APRN  Authorized by: Maci Menchaca APRN   Comparison: compared with previous ECG   Similar to previous ECG  Rhythm: sinus rhythm  BPM: 72  Conduction: non-specific intraventricular conduction delay              Assessment:       Diagnosis Plan   1. Cardiomyopathy, nonischemic (CMS/HCC)     2. Ventricular tachycardia (CMS/HCC)     3. S/P ICD (internal cardiac defibrillator) procedure     4. Overweight (BMI 25.0-29.9)            Plan:       1. NICM, last echo in 2/2020, EF 33%---no heart failure by exam. On ACE and BB.     2.-3. VT, s/p ICD--s/p VT ablation--/Dr. Levy. Shock in 2/2020---none since that time, few non sustained runs, continue to monitor.     4. For the problem of overweight/obesity, we discussed the importance of lifestyle measures and strategies for weight loss, such as improved nutrition, regular exercise and sleep hygiene.      Remote check in 3 months, follow up with Dr. Yancey in 6 months         As always, it has been a pleasure to participate in your patient's care.      Sincerely,         WILD Steele

## 2021-02-09 ENCOUNTER — HOSPITAL ENCOUNTER (OUTPATIENT)
Dept: CT IMAGING | Facility: HOSPITAL | Age: 51
Discharge: HOME | End: 2021-02-09
Admitting: INTERNAL MEDICINE

## 2021-02-09 DIAGNOSIS — R91.1 NODULE OF RIGHT LUNG: ICD-10-CM

## 2021-02-09 PROCEDURE — 71250 CT THORAX DX C-: CPT

## 2021-03-24 ENCOUNTER — BULK ORDERING (OUTPATIENT)
Dept: CASE MANAGEMENT | Facility: OTHER | Age: 51
End: 2021-03-24

## 2021-03-24 DIAGNOSIS — Z23 IMMUNIZATION DUE: ICD-10-CM

## 2021-03-31 RX ORDER — CARVEDILOL 6.25 MG/1
TABLET ORAL
Qty: 180 TABLET | Refills: 0 | Status: SHIPPED | OUTPATIENT
Start: 2021-03-31 | End: 2021-08-09

## 2021-03-31 RX ORDER — LISINOPRIL 20 MG/1
TABLET ORAL
Qty: 90 TABLET | Refills: 0 | Status: SHIPPED | OUTPATIENT
Start: 2021-03-31 | End: 2021-08-09

## 2021-04-16 PROCEDURE — 93295 DEV INTERROG REMOTE 1/2/MLT: CPT | Performed by: INTERNAL MEDICINE

## 2021-04-16 PROCEDURE — 93296 REM INTERROG EVL PM/IDS: CPT | Performed by: INTERNAL MEDICINE

## 2021-05-05 ENCOUNTER — CLINICAL SUPPORT NO REQUIREMENTS (OUTPATIENT)
Dept: CARDIOLOGY | Facility: CLINIC | Age: 51
End: 2021-05-05

## 2021-05-05 ENCOUNTER — OFFICE VISIT (OUTPATIENT)
Dept: CARDIOLOGY | Facility: CLINIC | Age: 51
End: 2021-05-05

## 2021-05-05 VITALS
SYSTOLIC BLOOD PRESSURE: 102 MMHG | DIASTOLIC BLOOD PRESSURE: 68 MMHG | HEIGHT: 65 IN | WEIGHT: 187 LBS | HEART RATE: 67 BPM | BODY MASS INDEX: 31.16 KG/M2

## 2021-05-05 DIAGNOSIS — I42.8 CARDIOMYOPATHY, NONISCHEMIC (HCC): Primary | ICD-10-CM

## 2021-05-05 DIAGNOSIS — Z95.810 S/P ICD (INTERNAL CARDIAC DEFIBRILLATOR) PROCEDURE: ICD-10-CM

## 2021-05-05 DIAGNOSIS — I47.20 VENTRICULAR TACHYCARDIA (HCC): ICD-10-CM

## 2021-05-05 PROCEDURE — 93282 PRGRMG EVAL IMPLANTABLE DFB: CPT | Performed by: INTERNAL MEDICINE

## 2021-05-05 PROCEDURE — 93000 ELECTROCARDIOGRAM COMPLETE: CPT | Performed by: INTERNAL MEDICINE

## 2021-05-05 PROCEDURE — 99214 OFFICE O/P EST MOD 30 MIN: CPT | Performed by: INTERNAL MEDICINE

## 2021-05-05 RX ORDER — SPIRONOLACTONE 25 MG/1
25 TABLET ORAL DAILY
Qty: 30 TABLET | Refills: 11 | Status: SHIPPED | OUTPATIENT
Start: 2021-05-05 | End: 2022-05-23 | Stop reason: SDUPTHER

## 2021-05-05 NOTE — PROGRESS NOTES
Date of Office Visit: 2021  Encounter Provider: Grayson Yancey MD  Place of Service: [facility] CARDIOLOGY  Patient Name: Tiffani Fiore  : 1970    Subjective:     Encounter Date:2021      Patient ID: Tiffani Fiore is a 50 y.o. female who has a cc of  NICM and VT and Jakob Tung did ablation in UC.     She had a good 6 months.     She does have occ one-offs where she feels chest pressure.     No anginal chest pain,   No sig rodriguez,   No soa,   No fainting,  Occ orthostasis.   No edema.   Exercise tolerance: good.     No shocks.     There have been no hospital admission since the last visit.     There have been no bleeding events.       Past Medical History:   Diagnosis Date   • Cardiomyopathy (CMS/HCC)    • Chest pain    • CHF (congestive heart failure) (CMS/[facility])    • Clostridium difficile colitis    • Depression    • DVT (deep venous thrombosis) (CMS/[facility])    • Dyspnea    • Fatigue    • History of paroxysmal atrial tachycardia    • History of transfusion    • Hyperlipidemia    • Hypertension    • ICD (implantable cardioverter-defibrillator) in place     LEFT SUBCLAVIAN AREA   • Ischemic cardiomyopathy    • Migraine    • Myocardial infarction (CMS/[facility])    • Obesity due to excess calories    • Overweight (BMI 25.0-29.9)    • Palpitations     ICD   • PONV (postoperative nausea and vomiting)    • Pulmonary embolism (CMS/[facility])    • S/P ICD (internal cardiac defibrillator) procedure    • SVT (supraventricular tachycardia) (CMS/[facility])    • Tachycardia    • VT (ventricular tachycardia) (CMS/[facility])     recurrent       Social History     Socioeconomic History   • Marital status: Single     Spouse name: Not on file   • Number of children: Not on file   • Years of education: Not on file   • Highest education level: Not on file   Tobacco Use   • Smoking status: Former Smoker     Packs/day: 0.50     Years: 15.00     Pack years: 7.50     Types: Cigarettes     Quit date:       "Years since quittin.3   • Smokeless tobacco: Former User   • Tobacco comment: quit age 42   Vaping Use   • Vaping Use: Never used   Substance and Sexual Activity   • Alcohol use: No   • Drug use: No   • Sexual activity: Defer       Family History   Problem Relation Age of Onset   • Diabetes Father    • Cancer Father         lung   • COPD Mother    • Malig Hyperthermia Neg Hx        Review of Systems   Constitutional: Negative for fever and night sweats.   HENT: Negative for ear pain and stridor.    Eyes: Negative for discharge and visual halos.   Cardiovascular: Negative for cyanosis.   Respiratory: Negative for hemoptysis and sputum production.    Hematologic/Lymphatic: Negative for adenopathy.   Skin: Negative for nail changes and unusual hair distribution.   Musculoskeletal: Negative for gout and joint swelling.   Gastrointestinal: Negative for bowel incontinence and flatus.   Genitourinary: Negative for dysuria and flank pain.   Neurological: Negative for seizures and tremors.   Psychiatric/Behavioral: Negative for altered mental status. The patient is not nervous/anxious.             Objective:     Vitals:    21 1029   BP: 102/68   Pulse: 67   Weight: 84.8 kg (187 lb)   Height: 165.1 cm (65\")         Eyes:      General:         Right eye: No discharge.         Left eye: No discharge.   HENT:      Head: Normocephalic and atraumatic.   Neck:      Thyroid: No thyromegaly.      Vascular: No JVD.   Pulmonary:      Effort: Pulmonary effort is normal.      Breath sounds: Normal breath sounds. No rales.   Cardiovascular:      Normal rate. Regular rhythm.      No gallop.   Edema:     Peripheral edema absent.   Abdominal:      General: Bowel sounds are normal.      Palpations: Abdomen is soft.      Tenderness: There is no abdominal tenderness.   Musculoskeletal: Normal range of motion.         General: No deformity. Skin:     General: Skin is warm and dry.      Findings: No erythema.   Neurological:      Mental " Status: Alert and oriented to person, place, and time.      Motor: Normal muscle tone.   Psychiatric:         Behavior: Behavior normal.         Thought Content: Thought content normal.           ECG 12 Lead    Date/Time: 5/5/2021 11:04 AM  Performed by: Grayson Yancey MD  Authorized by: Grayson Yancey MD   Comparison: compared with previous ECG   Similar to previous ECG  Rhythm: sinus rhythm  Conduction: left bundle branch block            Lab Review:       Assessment:          Diagnosis Plan   1. Cardiomyopathy, nonischemic (CMS/HCC)     2. S/P ICD (internal cardiac defibrillator) procedure     3. Ventricular tachycardia (CMS/HCC)            Plan:       Due to high LV threshold her CRT is programmed VVI at 50.     She has LBBB but is functional class 1. No symptoms    She had on NSVT episode -- no shock.     Last echo in 2020 -_ EF 33%/

## 2021-08-09 RX ORDER — LISINOPRIL 20 MG/1
TABLET ORAL
Qty: 90 TABLET | Refills: 1 | Status: SHIPPED | OUTPATIENT
Start: 2021-08-09 | End: 2022-01-13

## 2021-08-09 RX ORDER — CARVEDILOL 6.25 MG/1
TABLET ORAL
Qty: 180 TABLET | Refills: 1 | Status: SHIPPED | OUTPATIENT
Start: 2021-08-09 | End: 2022-01-13

## 2021-08-10 PROCEDURE — 93295 DEV INTERROG REMOTE 1/2/MLT: CPT | Performed by: INTERNAL MEDICINE

## 2021-08-10 PROCEDURE — 93296 REM INTERROG EVL PM/IDS: CPT | Performed by: INTERNAL MEDICINE

## 2021-11-09 PROCEDURE — 93296 REM INTERROG EVL PM/IDS: CPT | Performed by: INTERNAL MEDICINE

## 2021-11-09 PROCEDURE — 93295 DEV INTERROG REMOTE 1/2/MLT: CPT | Performed by: INTERNAL MEDICINE

## 2021-12-03 ENCOUNTER — APPOINTMENT (OUTPATIENT)
Dept: GENERAL RADIOLOGY | Facility: HOSPITAL | Age: 51
End: 2021-12-03

## 2021-12-03 ENCOUNTER — HOSPITAL ENCOUNTER (INPATIENT)
Facility: HOSPITAL | Age: 51
LOS: 3 days | Discharge: HOME | End: 2021-12-06
Attending: EMERGENCY MEDICINE | Admitting: INTERNAL MEDICINE

## 2021-12-03 DIAGNOSIS — Z45.02 AICD DISCHARGE: ICD-10-CM

## 2021-12-03 DIAGNOSIS — E87.6 HYPOKALEMIA: Primary | ICD-10-CM

## 2021-12-03 DIAGNOSIS — E83.51 HYPOCALCEMIA: ICD-10-CM

## 2021-12-03 DIAGNOSIS — R07.9 CHEST PAIN, UNSPECIFIED TYPE: ICD-10-CM

## 2021-12-03 DIAGNOSIS — E83.42 HYPOMAGNESEMIA: ICD-10-CM

## 2021-12-03 LAB
ALBUMIN SERPL-MCNC: 3 G/DL (ref 3.5–5.2)
ALBUMIN/GLOB SERPL: 1.7 G/DL
ALP SERPL-CCNC: 68 U/L (ref 39–117)
ALT SERPL W P-5'-P-CCNC: 16 U/L (ref 1–33)
ANION GAP SERPL CALCULATED.3IONS-SCNC: 8.2 MMOL/L (ref 5–15)
AST SERPL-CCNC: 19 U/L (ref 1–32)
BASOPHILS # BLD AUTO: 0.05 10*3/MM3 (ref 0–0.2)
BASOPHILS NFR BLD AUTO: 0.4 % (ref 0–1.5)
BILIRUB SERPL-MCNC: 0.3 MG/DL (ref 0–1.2)
BUN SERPL-MCNC: 10 MG/DL (ref 6–20)
BUN/CREAT SERPL: 22.7 (ref 7–25)
CALCIUM SPEC-SCNC: 5.9 MG/DL (ref 8.6–10.5)
CHLORIDE SERPL-SCNC: 117 MMOL/L (ref 98–107)
CO2 SERPL-SCNC: 19.8 MMOL/L (ref 22–29)
CREAT SERPL-MCNC: 0.44 MG/DL (ref 0.57–1)
DEPRECATED RDW RBC AUTO: 37.4 FL (ref 37–54)
EOSINOPHIL # BLD AUTO: 0 10*3/MM3 (ref 0–0.4)
EOSINOPHIL NFR BLD AUTO: 0 % (ref 0.3–6.2)
ERYTHROCYTE [DISTWIDTH] IN BLOOD BY AUTOMATED COUNT: 11.7 % (ref 12.3–15.4)
GFR SERPL CREATININE-BSD FRML MDRD: >150 ML/MIN/1.73
GLOBULIN UR ELPH-MCNC: 1.8 GM/DL
GLUCOSE BLDC GLUCOMTR-MCNC: 115 MG/DL (ref 70–130)
GLUCOSE SERPL-MCNC: 67 MG/DL (ref 65–99)
HCT VFR BLD AUTO: 35.9 % (ref 34–46.6)
HGB BLD-MCNC: 12.5 G/DL (ref 12–15.9)
IMM GRANULOCYTES # BLD AUTO: 0.15 10*3/MM3 (ref 0–0.05)
IMM GRANULOCYTES NFR BLD AUTO: 1.3 % (ref 0–0.5)
LIPASE SERPL-CCNC: 22 U/L (ref 13–60)
LYMPHOCYTES # BLD AUTO: 2.44 10*3/MM3 (ref 0.7–3.1)
LYMPHOCYTES NFR BLD AUTO: 21.2 % (ref 19.6–45.3)
MAGNESIUM SERPL-MCNC: 1.5 MG/DL (ref 1.6–2.6)
MCH RBC QN AUTO: 31.1 PG (ref 26.6–33)
MCHC RBC AUTO-ENTMCNC: 34.8 G/DL (ref 31.5–35.7)
MCV RBC AUTO: 89.3 FL (ref 79–97)
MONOCYTES # BLD AUTO: 0.7 10*3/MM3 (ref 0.1–0.9)
MONOCYTES NFR BLD AUTO: 6.1 % (ref 5–12)
NEUTROPHILS NFR BLD AUTO: 71 % (ref 42.7–76)
NEUTROPHILS NFR BLD AUTO: 8.18 10*3/MM3 (ref 1.7–7)
NRBC BLD AUTO-RTO: 0 /100 WBC (ref 0–0.2)
PLATELET # BLD AUTO: 197 10*3/MM3 (ref 140–450)
PMV BLD AUTO: 10.3 FL (ref 6–12)
POTASSIUM SERPL-SCNC: 2.6 MMOL/L (ref 3.5–5.2)
PROT SERPL-MCNC: 4.8 G/DL (ref 6–8.5)
QT INTERVAL: 469 MS
RBC # BLD AUTO: 4.02 10*6/MM3 (ref 3.77–5.28)
SARS-COV-2 RNA PNL SPEC NAA+PROBE: NOT DETECTED
SODIUM SERPL-SCNC: 145 MMOL/L (ref 136–145)
TROPONIN T SERPL-MCNC: 0.01 NG/ML (ref 0–0.03)
WBC NRBC COR # BLD: 11.52 10*3/MM3 (ref 3.4–10.8)

## 2021-12-03 PROCEDURE — 83735 ASSAY OF MAGNESIUM: CPT | Performed by: EMERGENCY MEDICINE

## 2021-12-03 PROCEDURE — 0 LIDOCAINE 1 % SOLUTION: Performed by: NURSE PRACTITIONER

## 2021-12-03 PROCEDURE — 80053 COMPREHEN METABOLIC PANEL: CPT | Performed by: EMERGENCY MEDICINE

## 2021-12-03 PROCEDURE — 83690 ASSAY OF LIPASE: CPT | Performed by: NURSE PRACTITIONER

## 2021-12-03 PROCEDURE — 25010000002 AMIODARONE IN DEXTROSE 5% 150-4.21 MG/100ML-% SOLUTION: Performed by: INTERNAL MEDICINE

## 2021-12-03 PROCEDURE — 87635 SARS-COV-2 COVID-19 AMP PRB: CPT | Performed by: NURSE PRACTITIONER

## 2021-12-03 PROCEDURE — 25010000002 AMIODARONE IN DEXTROSE 5% 360-4.14 MG/200ML-% SOLUTION: Performed by: INTERNAL MEDICINE

## 2021-12-03 PROCEDURE — 0 POTASSIUM CHLORIDE 10 MEQ/100ML SOLUTION: Performed by: NURSE PRACTITIONER

## 2021-12-03 PROCEDURE — 25010000002 MAGNESIUM SULFATE 2 GM/50ML SOLUTION: Performed by: NURSE PRACTITIONER

## 2021-12-03 PROCEDURE — 93005 ELECTROCARDIOGRAM TRACING: CPT | Performed by: INTERNAL MEDICINE

## 2021-12-03 PROCEDURE — 93005 ELECTROCARDIOGRAM TRACING: CPT | Performed by: EMERGENCY MEDICINE

## 2021-12-03 PROCEDURE — 25010000002 MORPHINE PER 10 MG: Performed by: NURSE PRACTITIONER

## 2021-12-03 PROCEDURE — 93010 ELECTROCARDIOGRAM REPORT: CPT | Performed by: INTERNAL MEDICINE

## 2021-12-03 PROCEDURE — 25010000002 CALCIUM GLUCONATE-NACL 1-0.675 GM/50ML-% SOLUTION: Performed by: NURSE PRACTITIONER

## 2021-12-03 PROCEDURE — 82962 GLUCOSE BLOOD TEST: CPT

## 2021-12-03 PROCEDURE — 99285 EMERGENCY DEPT VISIT HI MDM: CPT

## 2021-12-03 PROCEDURE — 25010000002 ONDANSETRON PER 1 MG: Performed by: NURSE PRACTITIONER

## 2021-12-03 PROCEDURE — 85025 COMPLETE CBC W/AUTO DIFF WBC: CPT | Performed by: EMERGENCY MEDICINE

## 2021-12-03 PROCEDURE — 71045 X-RAY EXAM CHEST 1 VIEW: CPT

## 2021-12-03 PROCEDURE — 84484 ASSAY OF TROPONIN QUANT: CPT | Performed by: EMERGENCY MEDICINE

## 2021-12-03 RX ORDER — LISINOPRIL 20 MG/1
20 TABLET ORAL DAILY
Status: DISCONTINUED | OUTPATIENT
Start: 2021-12-04 | End: 2021-12-06 | Stop reason: HOSPADM

## 2021-12-03 RX ORDER — NITROGLYCERIN 0.4 MG/1
0.4 TABLET SUBLINGUAL
Status: DISCONTINUED | OUTPATIENT
Start: 2021-12-03 | End: 2021-12-06 | Stop reason: HOSPADM

## 2021-12-03 RX ORDER — SODIUM CHLORIDE 0.9 % (FLUSH) 0.9 %
10 SYRINGE (ML) INJECTION AS NEEDED
Status: DISCONTINUED | OUTPATIENT
Start: 2021-12-03 | End: 2021-12-06 | Stop reason: HOSPADM

## 2021-12-03 RX ORDER — POTASSIUM CHLORIDE 7.45 MG/ML
10 INJECTION INTRAVENOUS
Status: DISCONTINUED | OUTPATIENT
Start: 2021-12-03 | End: 2021-12-06 | Stop reason: HOSPADM

## 2021-12-03 RX ORDER — MAGNESIUM SULFATE HEPTAHYDRATE 40 MG/ML
4 INJECTION, SOLUTION INTRAVENOUS AS NEEDED
Status: DISCONTINUED | OUTPATIENT
Start: 2021-12-03 | End: 2021-12-06 | Stop reason: HOSPADM

## 2021-12-03 RX ORDER — SODIUM CHLORIDE 0.9 % (FLUSH) 0.9 %
10 SYRINGE (ML) INJECTION EVERY 12 HOURS SCHEDULED
Status: DISCONTINUED | OUTPATIENT
Start: 2021-12-03 | End: 2021-12-06 | Stop reason: HOSPADM

## 2021-12-03 RX ORDER — LIDOCAINE HYDROCHLORIDE 10 MG/ML
10 INJECTION, SOLUTION INFILTRATION; PERINEURAL ONCE
Status: COMPLETED | OUTPATIENT
Start: 2021-12-03 | End: 2021-12-03

## 2021-12-03 RX ORDER — POTASSIUM CHLORIDE 750 MG/1
40 TABLET, FILM COATED, EXTENDED RELEASE ORAL AS NEEDED
Status: DISCONTINUED | OUTPATIENT
Start: 2021-12-03 | End: 2021-12-06 | Stop reason: HOSPADM

## 2021-12-03 RX ORDER — POTASSIUM CHLORIDE 750 MG/1
40 TABLET, FILM COATED, EXTENDED RELEASE ORAL ONCE
Status: COMPLETED | OUTPATIENT
Start: 2021-12-03 | End: 2021-12-03

## 2021-12-03 RX ORDER — ONDANSETRON 2 MG/ML
4 INJECTION INTRAMUSCULAR; INTRAVENOUS EVERY 6 HOURS PRN
Status: DISCONTINUED | OUTPATIENT
Start: 2021-12-03 | End: 2021-12-06 | Stop reason: HOSPADM

## 2021-12-03 RX ORDER — CARVEDILOL 12.5 MG/1
12.5 TABLET ORAL EVERY MORNING
Status: DISCONTINUED | OUTPATIENT
Start: 2021-12-04 | End: 2021-12-06 | Stop reason: HOSPADM

## 2021-12-03 RX ORDER — SPIRONOLACTONE 25 MG/1
25 TABLET ORAL DAILY
Status: DISCONTINUED | OUTPATIENT
Start: 2021-12-04 | End: 2021-12-06 | Stop reason: HOSPADM

## 2021-12-03 RX ORDER — MORPHINE SULFATE 2 MG/ML
2 INJECTION, SOLUTION INTRAMUSCULAR; INTRAVENOUS ONCE
Status: COMPLETED | OUTPATIENT
Start: 2021-12-03 | End: 2021-12-03

## 2021-12-03 RX ORDER — MAGNESIUM SULFATE HEPTAHYDRATE 40 MG/ML
2 INJECTION, SOLUTION INTRAVENOUS AS NEEDED
Status: DISCONTINUED | OUTPATIENT
Start: 2021-12-03 | End: 2021-12-06 | Stop reason: HOSPADM

## 2021-12-03 RX ORDER — MAGNESIUM SULFATE HEPTAHYDRATE 40 MG/ML
2 INJECTION, SOLUTION INTRAVENOUS ONCE
Status: COMPLETED | OUTPATIENT
Start: 2021-12-03 | End: 2021-12-03

## 2021-12-03 RX ORDER — ONDANSETRON 2 MG/ML
4 INJECTION INTRAMUSCULAR; INTRAVENOUS ONCE
Status: COMPLETED | OUTPATIENT
Start: 2021-12-03 | End: 2021-12-03

## 2021-12-03 RX ORDER — CALCIUM GLUCONATE 20 MG/ML
1 INJECTION, SOLUTION INTRAVENOUS ONCE
Status: COMPLETED | OUTPATIENT
Start: 2021-12-03 | End: 2021-12-03

## 2021-12-03 RX ORDER — POTASSIUM CHLORIDE 1.5 G/1.77G
40 POWDER, FOR SOLUTION ORAL AS NEEDED
Status: DISCONTINUED | OUTPATIENT
Start: 2021-12-03 | End: 2021-12-06 | Stop reason: HOSPADM

## 2021-12-03 RX ADMIN — ONDANSETRON 4 MG: 2 INJECTION INTRAMUSCULAR; INTRAVENOUS at 19:21

## 2021-12-03 RX ADMIN — POTASSIUM CHLORIDE 10 MEQ: 10 INJECTION, SOLUTION INTRAVENOUS at 21:53

## 2021-12-03 RX ADMIN — AMIODARONE HYDROCHLORIDE 1 MG/MIN: 1.8 INJECTION, SOLUTION INTRAVENOUS at 19:15

## 2021-12-03 RX ADMIN — MORPHINE SULFATE 2 MG: 2 INJECTION, SOLUTION INTRAMUSCULAR; INTRAVENOUS at 19:21

## 2021-12-03 RX ADMIN — POTASSIUM CHLORIDE 40 MEQ: 10 TABLET, EXTENDED RELEASE ORAL at 19:21

## 2021-12-03 RX ADMIN — LIDOCAINE HYDROCHLORIDE 10 ML: 10 INJECTION, SOLUTION INFILTRATION; PERINEURAL at 21:53

## 2021-12-03 RX ADMIN — AMIODARONE HYDROCHLORIDE 150 MG: 1.5 INJECTION, SOLUTION INTRAVENOUS at 18:49

## 2021-12-03 RX ADMIN — METOPROLOL TARTRATE 12.5 MG: 25 TABLET, FILM COATED ORAL at 18:42

## 2021-12-03 RX ADMIN — POTASSIUM CHLORIDE 10 MEQ: 10 INJECTION, SOLUTION INTRAVENOUS at 19:26

## 2021-12-03 RX ADMIN — CALCIUM GLUCONATE 1 G: 20 INJECTION, SOLUTION INTRAVENOUS at 20:33

## 2021-12-03 RX ADMIN — SODIUM CHLORIDE, PRESERVATIVE FREE 10 ML: 5 INJECTION INTRAVENOUS at 23:38

## 2021-12-03 RX ADMIN — MAGNESIUM SULFATE HEPTAHYDRATE 2 G: 2 INJECTION, SOLUTION INTRAVENOUS at 19:25

## 2021-12-03 RX ADMIN — POTASSIUM CHLORIDE 40 MEQ: 10 TABLET, EXTENDED RELEASE ORAL at 23:37

## 2021-12-03 NOTE — ED PROVIDER NOTES
Documentation error, I did not actually see this patient but rather she was seen by Dr. Arshad.  Please see his note           William Carreno MD  12/04/21 [id number]

## 2021-12-03 NOTE — ED TRIAGE NOTES
Patient was at work she had a syncopal episode while at work, her coworker caught her and she did not hit her head. Co-worker felt patient jerk and they think her Defibrillator went off. Co worker reports patient turned blue, EMS arrived and patient was awake and a&ox4. She was pale and clammy when they got there.  They gave patient 4 mg of zofran.    Patient has on mask nurse has on proper ppe.

## 2021-12-03 NOTE — ED PROVIDER NOTES
"EMERGENCY DEPARTMENT ENCOUNTER    Room Number:  30/30  Date seen:  12/3/2021  Time seen: 18:17 EST  PCP: Provider, No Known  Historian: patient, EMS    HPI:  Chief complaint:syncope  A complete HPI/ROS/PMH/PSH/SH/FH are unobtainable due to: n/a  Context:Tiffani Fiore is a 51 y.o. female who presents to the ED with c/o being in midst of conversation with coworkers when she felt the abnormal sensation in her chest that she gets when her defibrillator is about to fire.  Next thing she knows she is on the floor with people around her.  According to coworkers she turned blue and \"jerked\" and they thought her defibrillator may have fired. She states she has not had this happen in 2 years.  She has h/o vtach followed by Dr. Yancey.  Currently, she is very nauseated and states that she urinated on herself during this event which has not happened in the past.  She has vomited several times prior to arrival.  She is complaining of moderate chest pain described as a band squeezing around her chest but thinks that this is soreness related to the defibrillator firing.  She denies any shortness of breath.    Patient was placed in face mask in first look. Patient was wearing facemask when I entered the room and throughout our encounter. I wore full protective equipment throughout this patient encounter including a N95 face mask, eye shield and gloves. Hand hygiene/washing of hands was performed before donning protective equipment and after removal when leaving the room.    MEDICAL RECORD REVIEW    ALLERGIES  Patient has no known allergies.    PAST MEDICAL HISTORY  Active Ambulatory Problems     Diagnosis Date Noted   • Cardiomyopathy, nonischemic (HCC) 05/02/2016   • Atrial tachycardia, paroxysmal (HCC) 05/02/2016   • S/P ICD (internal cardiac defibrillator) procedure 06/21/2017   • Obesity 06/21/2017   • Ventricular tachycardia (HCC) 08/23/2017   • Arrhythmia 09/27/2017   • Cardiomyopathy (HCC) 10/20/2017   • Overweight " (BMI 25.0-29.9) 06/24/2019   • Recurrent ventricular tachycardia (HCC) 02/24/2020     Resolved Ambulatory Problems     Diagnosis Date Noted   • No Resolved Ambulatory Problems     Past Medical History:   Diagnosis Date   • Chest pain    • CHF (congestive heart failure) (CMS/[facility])    • Clostridium difficile colitis 2013   • Depression    • DVT (deep venous thrombosis) (CMS/[facility]) 2012   • Dyspnea    • Fatigue    • History of paroxysmal atrial tachycardia    • History of transfusion    • Hyperlipidemia    • Hypertension    • ICD (implantable cardioverter-defibrillator) in place    • Ischemic cardiomyopathy    • Migraine    • Myocardial infarction (CMS/[facility]) 2012   • Obesity due to excess calories    • Palpitations    • PONV (postoperative nausea and vomiting)    • Pulmonary embolism (CMS/[facility])    • SVT (supraventricular tachycardia) (CMS/[facility])    • Tachycardia    • VT (ventricular tachycardia) (CMS/[facility])        PAST SURGICAL HISTORY  Past Surgical History:   Procedure Laterality Date   • ABLATION OF DYSRHYTHMIC FOCUS  02/2018    Multiple VT ablations, last on at -Dr Levy, epicardial VT 2/2018   • BRONCHOSCOPY N/A 3/8/2018    Procedure: BRONCHOSCOPY WITH BAL;  Surgeon: Berto Nicholson MD;  Location: [facility] ENDOSCOPY;  Service:    • CARDIAC CATHETERIZATION N/A 8/30/2017    Procedure: Left Heart Cath;  Surgeon: Saúl Ann MD;  Location: [facility] CATH INVASIVE LOCATION;  Service:    • CARDIAC CATHETERIZATION N/A 8/30/2017    Procedure: Left ventriculography;  Surgeon: Saúl Ann MD;  Location: [facility] CATH INVASIVE LOCATION;  Service:    • CARDIAC CATHETERIZATION N/A 8/30/2017    Procedure: Coronary angiography;  Surgeon: Saúl Ann MD;  Location: [facility] CATH INVASIVE LOCATION;  Service:    • CARDIAC DEFIBRILLATOR PLACEMENT  2012   • CARDIAC DEFIBRILLATOR PLACEMENT  2015    [facility]NER DID LEAD REVISION WITH REPLACEMENT   • CARDIAC DEFIBRILLATOR PLACEMENT     • CARDIAC DEFIBRILLATOR PLACEMENT     • CARDIAC  ELECTROPHYSIOLOGY PROCEDURE N/A 2017    Procedure: Ablation VT  PATIENT HAS [place] ICD;  Surgeon: Grayson Yancey MD;  Location: [facility] CATH INVASIVE LOCATION;  Service:    •  SECTION     • HYSTERECTOMY     • IMPLANTABLE CARDIOVERTER DEFIBRILLATOR LEAD REPLACEMENT/POCKET REVISION N/A 10/20/2017    Procedure: ICD LEAD EXTRACTION TRANSVENOUS AND PLACEMENT OF RV AND LV LEAD;  Surgeon: Amrit Salgado MD;  Location: [facility] HYBRID OR ;  Service:    • TUBAL ABDOMINAL LIGATION         FAMILY HISTORY  Family History   Problem Relation Age of Onset   • Diabetes Father    • Cancer Father         lung   • COPD Mother    • Malig Hyperthermia Neg Hx        SOCIAL HISTORY  Social History     Socioeconomic History   • Marital status: Single   Tobacco Use   • Smoking status: Former Smoker     Packs/day: 0.50     Years: 15.00     Pack years: 7.50     Types: Cigarettes     Quit date:      Years since quittin.9   • Smokeless tobacco: Former User   • Tobacco comment: quit age 42   Vaping Use   • Vaping Use: Never used   Substance and Sexual Activity   • Alcohol use: No   • Drug use: No   • Sexual activity: Defer       REVIEW OF SYSTEMS  Review of Systems    All systems reviewed and negative except for those discussed in HPI.     PHYSICAL EXAM    ED Triage Vitals [21 1722]   Temp Heart Rate Resp BP SpO2   98 °F (36.7 °C) 84 18 121/76 97 %      Temp src Heart Rate Source Patient Position BP Location FiO2 (%)   -- -- -- -- --     Physical Exam    I have reviewed the triage vital signs and nursing notes.      GENERAL:  Distressed, appears uncomfortable  HENT: nares patent, mm moist  EYES: no scleral icterus  NECK: no ROM limitations  CV: regular rhythm, regular rate, no murmur, no rubs, no gallups  RESPIRATORY: normal effort, CTAB  ABDOMEN: soft  : deferred  MUSCULOSKELETAL: no deformity  NEURO: alert, moves all extremities, follows commands  SKIN: warm, dry    Critical Care  Performed by: Angelica Poole  WILD [place]  Authorized by: Gorge Arshad MD     Critical care provider statement:     Critical care time (minutes): 30-74 minutes.    Critical care was necessary to treat or prevent imminent or life-threatening deterioration of the following conditions:  Cardiac failure and circulatory failure    Critical care was time spent personally by me on the following activities:  Blood draw for specimens, development of treatment plan with patient or surrogate, discussions with consultants, evaluation of patient's response to treatment, examination of patient, obtaining history from patient or surrogate, ordering and performing treatments and interventions, ordering and review of laboratory studies, ordering and review of radiographic studies, pulse oximetry, re-evaluation of patient's condition and review of old charts        LAB RESULTS  Recent Results (from the past 24 hour(s))   Comprehensive Metabolic Panel    Collection Time: 12/03/21  6:04 PM    Specimen: Blood   Result Value Ref Range    Glucose 67 65 - 99 mg/dL    BUN 10 6 - 20 mg/dL    Creatinine 0.44 (L) 0.57 - 1.00 mg/dL    Sodium 145 136 - 145 mmol/L    Potassium 2.6 (C) 3.5 - 5.2 mmol/L    Chloride 117 (H) 98 - 107 mmol/L    CO2 19.8 (L) 22.0 - 29.0 mmol/L    Calcium 5.9 (C) 8.6 - 10.5 mg/dL    Total Protein 4.8 (L) 6.0 - 8.5 g/dL    Albumin 3.00 (L) 3.50 - 5.20 g/dL    ALT (SGPT) 16 1 - 33 U/L    AST (SGOT) 19 1 - 32 U/L    Alkaline Phosphatase 68 39 - 117 U/L    Total Bilirubin 0.3 0.0 - 1.2 mg/dL    eGFR Non African Amer >150 >60 mL/min/1.73    Globulin 1.8 gm/dL    A/G Ratio 1.7 g/dL    BUN/Creatinine Ratio 22.7 7.0 - 25.0    Anion Gap 8.2 5.0 - 15.0 mmol/L   Troponin    Collection Time: 12/03/21  6:04 PM    Specimen: Blood   Result Value Ref Range    Troponin T 0.013 0.000 - 0.030 ng/mL   Magnesium    Collection Time: 12/03/21  6:04 PM    Specimen: Blood   Result Value Ref Range    Magnesium 1.5 (L) 1.6 - 2.6 mg/dL   CBC Auto Differential    Collection  Time: 12/03/21  6:04 PM    Specimen: Blood   Result Value Ref Range    WBC 11.52 (H) 3.40 - 10.80 10*3/mm3    RBC 4.02 3.77 - 5.28 10*6/mm3    Hemoglobin 12.5 12.0 - 15.9 g/dL    Hematocrit 35.9 34.0 - 46.6 %    MCV 89.3 79.0 - 97.0 fL    MCH 31.1 26.6 - 33.0 pg    MCHC 34.8 31.5 - 35.7 g/dL    RDW 11.7 (L) 12.3 - 15.4 %    RDW-SD 37.4 37.0 - 54.0 fl    MPV 10.3 6.0 - 12.0 fL    Platelets 197 140 - 450 10*3/mm3    Neutrophil % 71.0 42.7 - 76.0 %    Lymphocyte % 21.2 19.6 - 45.3 %    Monocyte % 6.1 5.0 - 12.0 %    Eosinophil % 0.0 (L) 0.3 - 6.2 %    Basophil % 0.4 0.0 - 1.5 %    Immature Grans % 1.3 (H) 0.0 - 0.5 %    Neutrophils, Absolute 8.18 (H) 1.70 - 7.00 10*3/mm3    Lymphocytes, Absolute 2.44 0.70 - 3.10 10*3/mm3    Monocytes, Absolute 0.70 0.10 - 0.90 10*3/mm3    Eosinophils, Absolute 0.00 0.00 - 0.40 10*3/mm3    Basophils, Absolute 0.05 0.00 - 0.20 10*3/mm3    Immature Grans, Absolute 0.15 (H) 0.00 - 0.05 10*3/mm3    nRBC 0.0 0.0 - 0.2 /100 WBC   Lipase    Collection Time: 12/03/21  6:04 PM    Specimen: Blood   Result Value Ref Range    Lipase 22 13 - 60 U/L         RADIOLOGY RESULTS  No Radiology Exams Resulted Within Past 24 Hours       PROGRESS, DATA ANALYSIS, CONSULTS AND MEDICAL DECISION MAKING  All labs have been independently reviewed by me.  All radiology studies have been reviewed by me and discussed with radiologist dictating the report.  EKG's independently viewed and interpreted by me unless stated otherwise. Discussion below represents my analysis of pertinent findings related to patient's condition, differential diagnosis, treatment plan and final disposition.     ED Course as of 12/03/21 1947   Fri Dec 03, 2021   1821 Discussed patient with Óscar the [employer] rep.  He states that the patient has antitachycardia pacing which performed correctly but then she had V. tach and was shocked twice at 41 J.  The device is working appropriately and he does not recommend any changes.  I have placed a  call to cardiology. [EW]   1832 I discussed patient with Dr. Marshall, Cardiologist.  He will plan to admit as long as labs are WNL.  I have ordered dose of oral metoprolol and he is going to order Amiodarone loading dose.  Pt has tolerated Amiodarone in past.  [EW]   1905 Potassium(!!): 2.6 [EW]   1912 Calcium(!!): 5.9 [EW]   1917 I discussed abnormal labs with Dr. Marshall, will have [facility] admit and cardio as consult.  I have updated patient about the abnormal labs.  [EW]   1924 Potassium(!!): 2.6 [EW]   1924 Calcium(!!): 5.9 [EW]   1924 Magnesium(!): 1.5  Replacement for all the above have been initiated [EW]   1930 Discussed admission with Dr. Oneill, on call for [facility].  He will admit and manage electrolytes.  [EW]      ED Course User Index  [EW] Angelica Poole, WILD     DDX: defibrillation fired, electrolyte abnormality, equipment malfunction, chest pain, pancreatitis    MDM: The patient's defibrillator is working appropriately per the rep.  She has multiple electrolyte abnormalities including hypokalemia, hypocalcemia and hypomagnesemia.  She was started on amiodarone drip as well as with electrolyte replacement and will be admitted to the hospital for further work-up and management.  I did consider pancreatitis due to patient complaining of bandlike chest pain that radiates to her back but her lipase is negative.    Reviewed pt's history and workup with Dr. Arshad.  After a bedside evaluation, Dr. Arshad agrees with the plan of care.     Based on the patient's lab findings and presenting symptoms, the doctor and I feel it is appropriate to admit the patient for further management, evaluation, and treatment.  I have discussed this with the admitting team.  I have also discussed this with the patient/family.  They are in agreement with admission.          Disposition vitals:  /67   Pulse 68   Temp 98 °F (36.7 °C)   Resp 18   LMP 08/23/1997   SpO2 99%   Breastfeeding No       DIAGNOSIS  Final  diagnoses:   Hypokalemia   Hypomagnesemia   AICD discharge   Chest pain, unspecified type   Hypocalcemia       Admission     Angelica Poole, WILD  12/03/21 1951       Angelica Poole, WILD  12/03/21 [id number]

## 2021-12-04 ENCOUNTER — APPOINTMENT (OUTPATIENT)
Dept: CARDIOLOGY | Facility: HOSPITAL | Age: 51
End: 2021-12-04

## 2021-12-04 LAB
ALBUMIN SERPL-MCNC: 4.3 G/DL (ref 3.5–5.2)
ALBUMIN/GLOB SERPL: 1.7 G/DL
ALP SERPL-CCNC: 99 U/L (ref 39–117)
ALT SERPL W P-5'-P-CCNC: 21 U/L (ref 1–33)
ANION GAP SERPL CALCULATED.3IONS-SCNC: 11.3 MMOL/L (ref 5–15)
ANION GAP SERPL CALCULATED.3IONS-SCNC: 8.5 MMOL/L (ref 5–15)
ANION GAP SERPL CALCULATED.3IONS-SCNC: 9.8 MMOL/L (ref 5–15)
AORTIC ARCH: 2 CM
AORTIC DIMENSIONLESS INDEX: 0.7 (DI)
ASCENDING AORTA: 2.4 CM
AST SERPL-CCNC: 20 U/L (ref 1–32)
BASOPHILS # BLD AUTO: 0.03 10*3/MM3 (ref 0–0.2)
BASOPHILS NFR BLD AUTO: 0.3 % (ref 0–1.5)
BH CV ECHO MEAS - ACS: 1.5 CM
BH CV ECHO MEAS - AO ACC TIME: 0.13 SEC
BH CV ECHO MEAS - AO MAX PG (FULL): 2.3 MMHG
BH CV ECHO MEAS - AO MAX PG: 6.5 MMHG
BH CV ECHO MEAS - AO MEAN PG (FULL): 1.7 MMHG
BH CV ECHO MEAS - AO MEAN PG: 3.7 MMHG
BH CV ECHO MEAS - AO ROOT AREA (BSA CORRECTED): 1.5
BH CV ECHO MEAS - AO ROOT AREA: 6.5 CM^2
BH CV ECHO MEAS - AO ROOT DIAM: 2.9 CM
BH CV ECHO MEAS - AO V2 MAX: 127.1 CM/SEC
BH CV ECHO MEAS - AO V2 MEAN: 90.9 CM/SEC
BH CV ECHO MEAS - AO V2 VTI: 27.6 CM
BH CV ECHO MEAS - ASC AORTA: 2.4 CM
BH CV ECHO MEAS - AVA(I,A): 2.2 CM^2
BH CV ECHO MEAS - AVA(I,D): 2.2 CM^2
BH CV ECHO MEAS - AVA(V,A): 2.5 CM^2
BH CV ECHO MEAS - AVA(V,D): 2.5 CM^2
BH CV ECHO MEAS - BSA(HAYCOCK): 2.1 M^2
BH CV ECHO MEAS - BSA: 2 M^2
BH CV ECHO MEAS - BZI_BMI: 33.1 KILOGRAMS/M^2
BH CV ECHO MEAS - BZI_METRIC_HEIGHT: 165 CM
BH CV ECHO MEAS - BZI_METRIC_WEIGHT: 90 KG
BH CV ECHO MEAS - CONTRAST EF (2CH): 32.8 CM2
BH CV ECHO MEAS - CONTRAST EF 4CH: 36.2 CM2
BH CV ECHO MEAS - EDV(CUBED): 231 ML
BH CV ECHO MEAS - EDV(MOD-SP2): 177 ML
BH CV ECHO MEAS - EDV(MOD-SP4): 188 ML
BH CV ECHO MEAS - EDV(TEICH): 189.4 ML
BH CV ECHO MEAS - EF(CUBED): 46.8 %
BH CV ECHO MEAS - EF(MOD-BP): 34 %
BH CV ECHO MEAS - EF(TEICH): 38.4 %
BH CV ECHO MEAS - ESV(CUBED): 122.9 ML
BH CV ECHO MEAS - ESV(MOD-SP2): 119 ML
BH CV ECHO MEAS - ESV(MOD-SP4): 120 ML
BH CV ECHO MEAS - ESV(TEICH): 116.7 ML
BH CV ECHO MEAS - FS: 19 %
BH CV ECHO MEAS - IVS/LVPW: 1.2
BH CV ECHO MEAS - IVSD: 0.99 CM
BH CV ECHO MEAS - LAT PEAK E' VEL: 6.7 CM/SEC
BH CV ECHO MEAS - LV DIASTOLIC VOL/BSA (35-75): 95.4 ML/M^2
BH CV ECHO MEAS - LV MASS(C)D: 224.5 GRAMS
BH CV ECHO MEAS - LV MASS(C)DI: 113.9 GRAMS/M^2
BH CV ECHO MEAS - LV MAX PG: 4.2 MMHG
BH CV ECHO MEAS - LV MEAN PG: 2 MMHG
BH CV ECHO MEAS - LV SYSTOLIC VOL/BSA (12-30): 60.9 ML/M^2
BH CV ECHO MEAS - LV V1 MAX: 102 CM/SEC
BH CV ECHO MEAS - LV V1 MEAN: 64.1 CM/SEC
BH CV ECHO MEAS - LV V1 VTI: 19.5 CM
BH CV ECHO MEAS - LVIDD: 6.1 CM
BH CV ECHO MEAS - LVIDS: 5 CM
BH CV ECHO MEAS - LVLD AP2: 8.4 CM
BH CV ECHO MEAS - LVLD AP4: 8.9 CM
BH CV ECHO MEAS - LVLS AP2: 8 CM
BH CV ECHO MEAS - LVLS AP4: 7.8 CM
BH CV ECHO MEAS - LVOT AREA (M): 3.1 CM^2
BH CV ECHO MEAS - LVOT AREA: 3.2 CM^2
BH CV ECHO MEAS - LVOT DIAM: 2 CM
BH CV ECHO MEAS - LVPWD: 0.82 CM
BH CV ECHO MEAS - MED PEAK E' VEL: 4.9 CM/SEC
BH CV ECHO MEAS - MR MAX PG: 65.9 MMHG
BH CV ECHO MEAS - MR MAX VEL: 405.9 CM/SEC
BH CV ECHO MEAS - MR MEAN PG: 40.9 MMHG
BH CV ECHO MEAS - MR MEAN VEL: 302.3 CM/SEC
BH CV ECHO MEAS - MR VTI: 162.8 CM
BH CV ECHO MEAS - MV A DUR: 0.15 SEC
BH CV ECHO MEAS - MV A MAX VEL: 94.7 CM/SEC
BH CV ECHO MEAS - MV DEC SLOPE: 341.5 CM/SEC^2
BH CV ECHO MEAS - MV DEC TIME: 219 SEC
BH CV ECHO MEAS - MV E MAX VEL: 96 CM/SEC
BH CV ECHO MEAS - MV E/A: 1
BH CV ECHO MEAS - MV MAX PG: 5.2 MMHG
BH CV ECHO MEAS - MV MEAN PG: 2.4 MMHG
BH CV ECHO MEAS - MV P1/2T MAX VEL: 112.4 CM/SEC
BH CV ECHO MEAS - MV P1/2T: 96.4 MSEC
BH CV ECHO MEAS - MV V2 MAX: 114 CM/SEC
BH CV ECHO MEAS - MV V2 MEAN: 71.9 CM/SEC
BH CV ECHO MEAS - MV V2 VTI: 32.7 CM
BH CV ECHO MEAS - MVA P1/2T LCG: 2 CM^2
BH CV ECHO MEAS - MVA(P1/2T): 2.3 CM^2
BH CV ECHO MEAS - MVA(VTI): 1.9 CM^2
BH CV ECHO MEAS - PA ACC TIME: 0.1 SEC
BH CV ECHO MEAS - PA MAX PG (FULL): 3.4 MMHG
BH CV ECHO MEAS - PA MAX PG: 4.5 MMHG
BH CV ECHO MEAS - PA PR(ACCEL): 35 MMHG
BH CV ECHO MEAS - PA V2 MAX: 106.3 CM/SEC
BH CV ECHO MEAS - PULM A REVS DUR: 0.13 SEC
BH CV ECHO MEAS - PULM A REVS VEL: 25.3 CM/SEC
BH CV ECHO MEAS - PULM DIAS VEL: 52.3 CM/SEC
BH CV ECHO MEAS - PULM S/D: 0.83
BH CV ECHO MEAS - PULM SYS VEL: 43.1 CM/SEC
BH CV ECHO MEAS - PVA(V,A): 1.5 CM^2
BH CV ECHO MEAS - PVA(V,D): 1.5 CM^2
BH CV ECHO MEAS - QP/QS: 0.49
BH CV ECHO MEAS - RAP SYSTOLE: 15 MMHG
BH CV ECHO MEAS - RV MAX PG: 1.1 MMHG
BH CV ECHO MEAS - RV MEAN PG: 0.55 MMHG
BH CV ECHO MEAS - RV V1 MAX: 52.3 CM/SEC
BH CV ECHO MEAS - RV V1 MEAN: 33.9 CM/SEC
BH CV ECHO MEAS - RV V1 VTI: 10.1 CM
BH CV ECHO MEAS - RVOT AREA: 3 CM^2
BH CV ECHO MEAS - RVOT DIAM: 2 CM
BH CV ECHO MEAS - RVSP: 43 MMHG
BH CV ECHO MEAS - SI(AO): 91.3 ML/M^2
BH CV ECHO MEAS - SI(CUBED): 54.9 ML/M^2
BH CV ECHO MEAS - SI(LVOT): 31.3 ML/M^2
BH CV ECHO MEAS - SI(MOD-SP2): 29.4 ML/M^2
BH CV ECHO MEAS - SI(MOD-SP4): 34.5 ML/M^2
BH CV ECHO MEAS - SI(TEICH): 36.9 ML/M^2
BH CV ECHO MEAS - SUP REN AO DIAM: 2 CM
BH CV ECHO MEAS - SV(AO): 179.9 ML
BH CV ECHO MEAS - SV(CUBED): 108.1 ML
BH CV ECHO MEAS - SV(LVOT): 61.7 ML
BH CV ECHO MEAS - SV(MOD-SP2): 58 ML
BH CV ECHO MEAS - SV(MOD-SP4): 68 ML
BH CV ECHO MEAS - SV(RVOT): 30.5 ML
BH CV ECHO MEAS - SV(TEICH): 72.7 ML
BH CV ECHO MEAS - TAPSE (>1.6): 1.6 CM
BH CV ECHO MEAS - TR MAX VEL: 263.3 CM/SEC
BH CV ECHO MEASUREMENTS AVERAGE E/E' RATIO: 16.55
BH CV VAS BP LEFT ARM: NORMAL MMHG
BH CV XLRA - RV BASE: 3.8 CM
BH CV XLRA - RV LENGTH: 8.5 CM
BH CV XLRA - RV MID: 2.3 CM
BH CV XLRA - TDI S': 8.3 CM/SEC
BILIRUB SERPL-MCNC: 0.3 MG/DL (ref 0–1.2)
BUN SERPL-MCNC: 10 MG/DL (ref 6–20)
BUN SERPL-MCNC: 10 MG/DL (ref 6–20)
BUN SERPL-MCNC: 12 MG/DL (ref 6–20)
BUN/CREAT SERPL: 13 (ref 7–25)
BUN/CREAT SERPL: 13.7 (ref 7–25)
BUN/CREAT SERPL: 16.9 (ref 7–25)
CALCIUM SPEC-SCNC: 8.6 MG/DL (ref 8.6–10.5)
CALCIUM SPEC-SCNC: 8.9 MG/DL (ref 8.6–10.5)
CALCIUM SPEC-SCNC: 9.1 MG/DL (ref 8.6–10.5)
CHLORIDE SERPL-SCNC: 102 MMOL/L (ref 98–107)
CHLORIDE SERPL-SCNC: 104 MMOL/L (ref 98–107)
CHLORIDE SERPL-SCNC: 104 MMOL/L (ref 98–107)
CO2 SERPL-SCNC: 23.7 MMOL/L (ref 22–29)
CO2 SERPL-SCNC: 24.2 MMOL/L (ref 22–29)
CO2 SERPL-SCNC: 24.5 MMOL/L (ref 22–29)
CREAT SERPL-MCNC: 0.59 MG/DL (ref 0.57–1)
CREAT SERPL-MCNC: 0.73 MG/DL (ref 0.57–1)
CREAT SERPL-MCNC: 0.92 MG/DL (ref 0.57–1)
DEPRECATED RDW RBC AUTO: 38.8 FL (ref 37–54)
EOSINOPHIL # BLD AUTO: 0 10*3/MM3 (ref 0–0.4)
EOSINOPHIL NFR BLD AUTO: 0 % (ref 0.3–6.2)
ERYTHROCYTE [DISTWIDTH] IN BLOOD BY AUTOMATED COUNT: 11.9 % (ref 12.3–15.4)
GFR SERPL CREATININE-BSD FRML MDRD: 107 ML/MIN/1.73
GFR SERPL CREATININE-BSD FRML MDRD: 64 ML/MIN/1.73
GFR SERPL CREATININE-BSD FRML MDRD: 84 ML/MIN/1.73
GLOBULIN UR ELPH-MCNC: 2.5 GM/DL
GLUCOSE BLDC GLUCOMTR-MCNC: 110 MG/DL (ref 70–130)
GLUCOSE SERPL-MCNC: 118 MG/DL (ref 65–99)
GLUCOSE SERPL-MCNC: 97 MG/DL (ref 65–99)
GLUCOSE SERPL-MCNC: 97 MG/DL (ref 65–99)
HCT VFR BLD AUTO: 36.2 % (ref 34–46.6)
HGB BLD-MCNC: 12.4 G/DL (ref 12–15.9)
IMM GRANULOCYTES # BLD AUTO: 0.05 10*3/MM3 (ref 0–0.05)
IMM GRANULOCYTES NFR BLD AUTO: 0.5 % (ref 0–0.5)
LEFT ATRIUM VOLUME INDEX: 21.3 ML/M2
LYMPHOCYTES # BLD AUTO: 2.04 10*3/MM3 (ref 0.7–3.1)
LYMPHOCYTES NFR BLD AUTO: 19.4 % (ref 19.6–45.3)
MAGNESIUM SERPL-MCNC: 2.4 MG/DL (ref 1.6–2.6)
MCH RBC QN AUTO: 30.5 PG (ref 26.6–33)
MCHC RBC AUTO-ENTMCNC: 34.3 G/DL (ref 31.5–35.7)
MCV RBC AUTO: 89.2 FL (ref 79–97)
MONOCYTES # BLD AUTO: 0.66 10*3/MM3 (ref 0.1–0.9)
MONOCYTES NFR BLD AUTO: 6.3 % (ref 5–12)
NEUTROPHILS NFR BLD AUTO: 7.75 10*3/MM3 (ref 1.7–7)
NEUTROPHILS NFR BLD AUTO: 73.5 % (ref 42.7–76)
NRBC BLD AUTO-RTO: 0 /100 WBC (ref 0–0.2)
PLATELET # BLD AUTO: 202 10*3/MM3 (ref 140–450)
PMV BLD AUTO: 10.1 FL (ref 6–12)
POTASSIUM SERPL-SCNC: 4.5 MMOL/L (ref 3.5–5.2)
POTASSIUM SERPL-SCNC: 5 MMOL/L (ref 3.5–5.2)
POTASSIUM SERPL-SCNC: 5 MMOL/L (ref 3.5–5.2)
PROT SERPL-MCNC: 6.8 G/DL (ref 6–8.5)
QT INTERVAL: 481 MS
RBC # BLD AUTO: 4.06 10*6/MM3 (ref 3.77–5.28)
SINUS: 2.4 CM
SODIUM SERPL-SCNC: 137 MMOL/L (ref 136–145)
SODIUM SERPL-SCNC: 137 MMOL/L (ref 136–145)
SODIUM SERPL-SCNC: 138 MMOL/L (ref 136–145)
STJ: 2.1 CM
TROPONIN T SERPL-MCNC: 0.09 NG/ML (ref 0–0.03)
TROPONIN T SERPL-MCNC: 0.12 NG/ML (ref 0–0.03)
WBC NRBC COR # BLD: 10.53 10*3/MM3 (ref 3.4–10.8)

## 2021-12-04 PROCEDURE — 84484 ASSAY OF TROPONIN QUANT: CPT | Performed by: INTERNAL MEDICINE

## 2021-12-04 PROCEDURE — 25010000002 AMIODARONE IN DEXTROSE 5% 360-4.14 MG/200ML-% SOLUTION: Performed by: INTERNAL MEDICINE

## 2021-12-04 PROCEDURE — 25010000002 PERFLUTREN (DEFINITY) 8.476 MG IN SODIUM CHLORIDE (PF) 0.9 % 10 ML INJECTION: Performed by: INTERNAL MEDICINE

## 2021-12-04 PROCEDURE — 80048 BASIC METABOLIC PNL TOTAL CA: CPT | Performed by: INTERNAL MEDICINE

## 2021-12-04 PROCEDURE — 93306 TTE W/DOPPLER COMPLETE: CPT | Performed by: INTERNAL MEDICINE

## 2021-12-04 PROCEDURE — 82962 GLUCOSE BLOOD TEST: CPT

## 2021-12-04 PROCEDURE — 93306 TTE W/DOPPLER COMPLETE: CPT

## 2021-12-04 PROCEDURE — 99222 1ST HOSP IP/OBS MODERATE 55: CPT | Performed by: INTERNAL MEDICINE

## 2021-12-04 PROCEDURE — 83735 ASSAY OF MAGNESIUM: CPT | Performed by: INTERNAL MEDICINE

## 2021-12-04 PROCEDURE — 80053 COMPREHEN METABOLIC PANEL: CPT | Performed by: INTERNAL MEDICINE

## 2021-12-04 PROCEDURE — 93005 ELECTROCARDIOGRAM TRACING: CPT | Performed by: INTERNAL MEDICINE

## 2021-12-04 PROCEDURE — 25010000002 ENOXAPARIN PER 10 MG: Performed by: INTERNAL MEDICINE

## 2021-12-04 PROCEDURE — 85025 COMPLETE CBC W/AUTO DIFF WBC: CPT | Performed by: INTERNAL MEDICINE

## 2021-12-04 RX ORDER — AMIODARONE HYDROCHLORIDE 200 MG/1
400 TABLET ORAL EVERY 8 HOURS
Status: DISCONTINUED | OUTPATIENT
Start: 2021-12-04 | End: 2021-12-06

## 2021-12-04 RX ORDER — ACETAMINOPHEN 500 MG
500 TABLET ORAL EVERY 6 HOURS PRN
Status: DISCONTINUED | OUTPATIENT
Start: 2021-12-04 | End: 2021-12-06 | Stop reason: HOSPADM

## 2021-12-04 RX ADMIN — AMIODARONE HYDROCHLORIDE 0.5 MG/MIN: 1.8 INJECTION, SOLUTION INTRAVENOUS at 11:12

## 2021-12-04 RX ADMIN — AMIODARONE HYDROCHLORIDE 400 MG: 200 TABLET ORAL at 18:55

## 2021-12-04 RX ADMIN — SODIUM CHLORIDE, PRESERVATIVE FREE 10 ML: 5 INJECTION INTRAVENOUS at 21:57

## 2021-12-04 RX ADMIN — AMIODARONE HYDROCHLORIDE 0.5 MG/MIN: 1.8 INJECTION, SOLUTION INTRAVENOUS at 21:21

## 2021-12-04 RX ADMIN — AMIODARONE HYDROCHLORIDE 1 MG/MIN: 1.8 INJECTION, SOLUTION INTRAVENOUS at 00:23

## 2021-12-04 RX ADMIN — PERFLUTREN 5 ML: 6.52 INJECTION, SUSPENSION INTRAVENOUS at 09:27

## 2021-12-04 RX ADMIN — ENOXAPARIN SODIUM 40 MG: 40 INJECTION SUBCUTANEOUS at 09:32

## 2021-12-04 RX ADMIN — ACETAMINOPHEN 500 MG: 500 TABLET, FILM COATED ORAL at 18:55

## 2021-12-04 RX ADMIN — POTASSIUM CHLORIDE 20 MEQ: 10 TABLET, EXTENDED RELEASE ORAL at 06:16

## 2021-12-04 RX ADMIN — AMIODARONE HYDROCHLORIDE 0.5 MG/MIN: 1.8 INJECTION, SOLUTION INTRAVENOUS at 01:15

## 2021-12-04 RX ADMIN — SPIRONOLACTONE 25 MG: 25 TABLET, FILM COATED ORAL at 09:32

## 2021-12-04 RX ADMIN — SODIUM CHLORIDE, PRESERVATIVE FREE 10 ML: 5 INJECTION INTRAVENOUS at 09:32

## 2021-12-04 RX ADMIN — CARVEDILOL 12.5 MG: 12.5 TABLET, FILM COATED ORAL at 06:12

## 2021-12-04 RX ADMIN — AMIODARONE HYDROCHLORIDE 400 MG: 200 TABLET ORAL at 10:45

## 2021-12-04 NOTE — PLAN OF CARE
Goal Outcome Evaluation:               Admitted from ER last night. ICD discharged while pt was work. VSS on room air. Pt on Amio gtt. C/o of soreness around chest area. NPO since midnight. Voiding per urinal. Currently resting in bed. Will continue to monitor.

## 2021-12-04 NOTE — ED PROVIDER NOTES
MD ATTESTATION NOTE    The ARNOLDO and I have discussed this patient's history, physical exam, and treatment plan.  I have reviewed the documentation and personally had a face to face interaction with the patient. I affirm the documentation and agree with the treatment and plan.  The attached note describes my personal findings.      Tiffani Fiore is a 51 y.o. female who presents to the ED c/o having a syncopal episode.  She reports that she was having a conversation with coworkers when she suddenly lost consciousness.  When she woke up she was staring at the ceiling.  She was told that she had a syncopal episode, turned blue, received CPR and bystanders felt that maybe her pacemaker fired.  She has a history of V. tach.  She is followed by Dr. Yancey.  She has a defibrillator.  She has some central chest pain and nausea.  She vomited prior to arrival.  She reports her chest feels like a band is wrapped around it.      On exam:  GENERAL: Awake, alert, no acute distress  SKIN: Warm, dry  HENT: Normocephalic, atraumatic  EYES: no scleral icterus  CV: regular rhythm, regular rate  RESPIRATORY: normal effort, lungs clear  ABDOMEN: soft, nontender, nondistended  MUSCULOSKELETAL: no deformity  NEURO: alert, moves all extremities, follows commands    Labs  Recent Results (from the past 24 hour(s))   Comprehensive Metabolic Panel    Collection Time: 12/03/21  6:04 PM    Specimen: Blood   Result Value Ref Range    Glucose 67 65 - 99 mg/dL    BUN 10 6 - 20 mg/dL    Creatinine 0.44 (L) 0.57 - 1.00 mg/dL    Sodium 145 136 - 145 mmol/L    Potassium 2.6 (C) 3.5 - 5.2 mmol/L    Chloride 117 (H) 98 - 107 mmol/L    CO2 19.8 (L) 22.0 - 29.0 mmol/L    Calcium 5.9 (C) 8.6 - 10.5 mg/dL    Total Protein 4.8 (L) 6.0 - 8.5 g/dL    Albumin 3.00 (L) 3.50 - 5.20 g/dL    ALT (SGPT) 16 1 - 33 U/L    AST (SGOT) 19 1 - 32 U/L    Alkaline Phosphatase 68 39 - 117 U/L    Total Bilirubin 0.3 0.0 - 1.2 mg/dL    eGFR Non African Amer >150 >60  mL/min/1.73    Globulin 1.8 gm/dL    A/G Ratio 1.7 g/dL    BUN/Creatinine Ratio 22.7 7.0 - 25.0    Anion Gap 8.2 5.0 - 15.0 mmol/L   Troponin    Collection Time: 12/03/21  6:04 PM    Specimen: Blood   Result Value Ref Range    Troponin T 0.013 0.000 - 0.030 ng/mL   Magnesium    Collection Time: 12/03/21  6:04 PM    Specimen: Blood   Result Value Ref Range    Magnesium 1.5 (L) 1.6 - 2.6 mg/dL   CBC Auto Differential    Collection Time: 12/03/21  6:04 PM    Specimen: Blood   Result Value Ref Range    WBC 11.52 (H) 3.40 - 10.80 10*3/mm3    RBC 4.02 3.77 - 5.28 10*6/mm3    Hemoglobin 12.5 12.0 - 15.9 g/dL    Hematocrit 35.9 34.0 - 46.6 %    MCV 89.3 79.0 - 97.0 fL    MCH 31.1 26.6 - 33.0 pg    MCHC 34.8 31.5 - 35.7 g/dL    RDW 11.7 (L) 12.3 - 15.4 %    RDW-SD 37.4 37.0 - 54.0 fl    MPV 10.3 6.0 - 12.0 fL    Platelets 197 140 - 450 10*3/mm3    Neutrophil % 71.0 42.7 - 76.0 %    Lymphocyte % 21.2 19.6 - 45.3 %    Monocyte % 6.1 5.0 - 12.0 %    Eosinophil % 0.0 (L) 0.3 - 6.2 %    Basophil % 0.4 0.0 - 1.5 %    Immature Grans % 1.3 (H) 0.0 - 0.5 %    Neutrophils, Absolute 8.18 (H) 1.70 - 7.00 10*3/mm3    Lymphocytes, Absolute 2.44 0.70 - 3.10 10*3/mm3    Monocytes, Absolute 0.70 0.10 - 0.90 10*3/mm3    Eosinophils, Absolute 0.00 0.00 - 0.40 10*3/mm3    Basophils, Absolute 0.05 0.00 - 0.20 10*3/mm3    Immature Grans, Absolute 0.15 (H) 0.00 - 0.05 10*3/mm3    nRBC 0.0 0.0 - 0.2 /100 WBC   Lipase    Collection Time: 12/03/21  6:04 PM    Specimen: Blood   Result Value Ref Range    Lipase 22 13 - 60 U/L   COVID-19,[facility] CEPHEID/SARBJIT NP SWAB IN TRANSPORT MEDIA 8-12 HR TAT - Swab, Nasopharynx    Collection Time: 12/03/21  7:14 PM    Specimen: Nasopharynx; Swab   Result Value Ref Range    COVID19 Not Detected Not Detected - Ref. Range   ECG 12 Lead    Collection Time: 12/03/21 10:43 PM   Result Value Ref Range    QT Interval 469 ms   POC Glucose Once    Collection Time: 12/03/21 11:42 PM    Specimen: Blood   Result Value  Ref Range    Glucose 115 70 - 130 mg/dL       Radiology  XR Chest 1 View    Result Date: 12/3/2021  STAT PORTABLE RADIOGRAPHIC VIEW THE CHEST  CLINICAL HISTORY: Shortness of air.  FINDINGS:  There is a left subclavian approach pacer device with its leads unchanged in position when compared to the prior study dated 02/24/2020. There is elevation of the left hemidiaphragm which was also seen on the prior exam. The cardiomediastinal silhouette is enlarged but stable in size. The lungs are clear of acute infiltrates. The osseous structures are unremarkable.       1. Stable findings when compared to the prior study dated 02/24/2020.  2. Enlarged cardiomediastinal silhouette.  3. Left subclavian approach pacer device.  4. Chronic elevation of the left hemidiaphragm.  This report was finalized on 12/3/2021 9:04 PM by Dr. Garry Oh M.D.        Medical Decision Making:  ED Course as of 12/04/21 0217   Fri Dec 03, 2021   1821 Discussed patient with Óscar the [employer] rep.  He states that the patient has antitachycardia pacing which performed correctly but then she had V. tach and was shocked twice at 41 J.  The device is working appropriately and he does not recommend any changes.  I have placed a call to cardiology. [EW]   1832 I discussed patient with Dr. Marshall, Cardiologist.  He will plan to admit as long as labs are WNL.  I have ordered dose of oral metoprolol and he is going to order Amiodarone loading dose.  Pt has tolerated Amiodarone in past.  [EW]   1905 Potassium(!!): 2.6 [EW]   1912 Calcium(!!): 5.9 [EW]   1917 I discussed abnormal labs with Dr. Marshall, will have [facility] admit and cardio as consult.  I have updated patient about the abnormal labs.  [EW]   1924 Potassium(!!): 2.6 [EW]   1924 Calcium(!!): 5.9 [EW]   1924 Magnesium(!): 1.5  Replacement for all the above have been initiated [EW]   1930 Discussed admission with Dr. Oneill, on call for [facility].  He will admit and manage electrolytes.  [EW]   2246  EKG          EKG time: 1745  Rhythm/Rate: Normal sinus, rate 69  P waves and ID: Normal P, normal ID  QRS, axis: Left bundle branch block  ST and T waves: No acute    Interpreted Contemporaneously by me, independently viewed  Not significantly changed compared to prior 2/24/2020   [TR]      ED Course User Index  [EW] Angelica PooleWILD  [TR] Gorge Arshad MD       Plan cardiac work-up including troponins, chemistries, chest x-ray, EKG.  Plan continuous cardiac monitoring.  Plan admission with cardiology consult.    Critical Care  Performed by: Gorge Arshad MD  Authorized by: Gorge Arshad MD     Critical care provider statement:     Critical care time (minutes): 30-74.    Critical care time was exclusive of:  Separately billable procedures and treating other patients    Critical care was necessary to treat or prevent imminent or life-threatening deterioration of the following conditions:  Metabolic crisis and cardiac failure    Critical care was time spent personally by me on the following activities:  Development of treatment plan with patient or surrogate, discussions with consultants, examination of patient, obtaining history from patient or surrogate, ordering and performing treatments and interventions, ordering and review of laboratory studies, ordering and review of radiographic studies, pulse oximetry, re-evaluation of patient's condition and review of old charts          PPE: The patient wore a mask and I wore an N95 mask throughout the entire patient encounter.      The patient has started, but not completed, their COVID-19 vaccination series.    Diagnosis  Final diagnoses:   Hypokalemia   Hypomagnesemia   AICD discharge   Chest pain, unspecified type   Hypocalcemia        Gorge Arshad MD  12/03/21 1941       Gorge Arshad MD  12/04/21 2546

## 2021-12-04 NOTE — NURSING NOTE
Notified Y[facility] Gonsalves Cardio RE: elevated Trop 0.118. Pt denies acute chest pain ekg revealed NSR

## 2021-12-04 NOTE — H&P
Internal medicine history and physical  INTERNAL MEDICINE   [facility]       Patient Identification:  Name: Tiffani Fiore  Age: 51 y.o.  Sex: female  :  1970  MRN: [id number]                   Primary Care Physician: Provider, No Known                               Date of admission:12/3/2021    Chief Complaint: Passed out at work with a prodrome of everything that came down in her stomach as if she is coming off normal question and subsequent discomfort around her chest as if he was shocked by his AICD device.  This occurred earlier today.    History of Present Illness:   Patient is a 51-year-old female with past medical history remarkable for nonischemic cardiomyopathy, history of paroxysmal atrial tachycardia, history of paroxysmal ventricular tachycardia for which she has AICD device in place, prior history of PE migraine headaches hypertension and dyslipidemia was in her usual state of health when she went to work.  She had her knee without any problem and subsequently was having conversation with her coworker and suddenly she felt feeling as if she is coming down the roller coaster with everything resting down and then subsequently she passed out and felt as if she was shocked by her defibrillator.  Patient describes the pain she is regaining her consciousness she saw lots of people including strangers around her and she was asking the stranger to move away from her because she knew that she is about to be shocked.  It is reported that bystanders were around her when this event occurred that she was cyanotic and had turned blue.  Patient did have multiple episodes of nausea and vomiting prior to coming to the hospital and admits to have urinated on herself.  Patient denies any recent changes in her diet habits, or drinking alcohol, or taking any new medications.  Work-up in the emergency room revealed hypokalemia serum magnesium of 1.5 and normal troponin.  Because of her  electrolyte imbalance internal medicine service is asked to admit the patient while cardiology service providing consulting health for possible recurrence of V. tach and subsequent AICD discharge.  Patient is currently feeling tired.      Past Medical History:  Past Medical History:   Diagnosis Date   • Cardiomyopathy (CMS/Formerly [facility]) 2012   • Chest pain    • CHF (congestive heart failure) (CMS/Formerly [facility])    • Clostridium difficile colitis 2013   • Depression    • DVT (deep venous thrombosis) (CMS/Formerly [facility]) 2012   • Dyspnea    • Fatigue    • History of paroxysmal atrial tachycardia    • History of transfusion    • Hyperlipidemia    • Hypertension    • ICD (implantable cardioverter-defibrillator) in place     LEFT SUBCLAVIAN AREA   • Ischemic cardiomyopathy    • Migraine    • Myocardial infarction (CMS/Formerly [facility]) 2012   • Obesity due to excess calories    • Overweight (BMI 25.0-29.9)    • Palpitations     ICD   • PONV (postoperative nausea and vomiting)    • Pulmonary embolism (CMS/HCC)    • S/P ICD (internal cardiac defibrillator) procedure    • SVT (supraventricular tachycardia) (CMS/HCC)    • Tachycardia    • VT (ventricular tachycardia) (CMS/HCC)     recurrent     Past Surgical History:  Past Surgical History:   Procedure Laterality Date   • ABLATION OF DYSRHYTHMIC FOCUS  02/2018    Multiple VT ablations, last on at -Dr Levy, epicardial VT 2/2018   • BRONCHOSCOPY N/A 3/8/2018    Procedure: BRONCHOSCOPY WITH BAL;  Surgeon: Berto Nicholson MD;  Location: [facility] ENDOSCOPY;  Service:    • CARDIAC CATHETERIZATION N/A 8/30/2017    Procedure: Left Heart Cath;  Surgeon: Saúl nAn MD;  Location: [facility] CATH INVASIVE LOCATION;  Service:    • CARDIAC CATHETERIZATION N/A 8/30/2017    Procedure: Left ventriculography;  Surgeon: Saúl Ann MD;  Location: [facility] CATH INVASIVE LOCATION;  Service:    • CARDIAC CATHETERIZATION N/A 8/30/2017    Procedure: Coronary angiography;  Surgeon: Saúl Ann MD;  Location: [facility] CATH INVASIVE  LOCATION;  Service:    • CARDIAC DEFIBRILLATOR PLACEMENT     • CARDIAC DEFIBRILLATOR PLACEMENT      CONNER FREGOSO LEAD REVISION WITH REPLACEMENT   • CARDIAC DEFIBRILLATOR PLACEMENT     • CARDIAC DEFIBRILLATOR PLACEMENT     • CARDIAC ELECTROPHYSIOLOGY PROCEDURE N/A 2017    Procedure: Ablation VT  PATIENT HAS [place] ICD;  Surgeon: Grayson Yancey MD;  Location: [facility] INVASIVE LOCATION;  Service:    •  SECTION     • HYSTERECTOMY     • IMPLANTABLE CARDIOVERTER DEFIBRILLATOR LEAD REPLACEMENT/POCKET REVISION N/A 10/20/2017    Procedure: ICD LEAD EXTRACTION TRANSVENOUS AND PLACEMENT OF RV AND LV LEAD;  Surgeon: Amrit Salgado MD;  Location[facility] HYBRID OR ;  Service:    • TUBAL ABDOMINAL LIGATION        Home Meds:  (Not in a hospital admission)    Current Meds:     Current Facility-Administered Medications:   •  [COMPLETED] amiodarone in dextrose 5% (NEXTERONE) loading dose 150mg/100mL, 150 mg, Intravenous, Once, Stopped at 21 **FOLLOWED BY** amiodarone 360 mg in 200 mL D5W infusion, 1 mg/min, Intravenous, Continuous, Last Rate: 33.3 mL/hr at 21, 1 mg/min at 21 **FOLLOWED BY** [START ON 2021] amiodarone 360 mg in 200 mL D5W infusion, 0.5 mg/min, Intravenous, Continuous, Jairo Marshall MD  •  calcium gluconate 1g/50ml 0.675% NaCl IV SOLN, 1 g, Intravenous, Once, Angelica Poole APRN  •  lidocaine (XYLOCAINE) 1 % injection 10 mL, 10 mL, Injection, Once, Angelica Poole APRN  •  potassium chloride 10 mEq in 100 mL IVPB, 10 mEq, Intravenous, Q1H PRN, Angelica Poole APRN, Last Rate: 100 mL/hr at 21, 10 mEq at 21  •  [COMPLETED] Insert peripheral IV, , , Once **AND** sodium chloride 0.9 % flush 10 mL, 10 mL, Intravenous, PRN, William Carreno MD    Current Outpatient Medications:   •  carvedilol (COREG) 6.25 MG tablet, TAKE TWO TABLETS BY MOUTH EVERY MORNING, Disp: 180 tablet, Rfl: 1  •  lisinopril (PRINIVIL,ZESTRIL) 20  MG tablet, TAKE ONE TABLET BY MOUTH DAILY, Disp: 90 tablet, Rfl: 1  •  spironolactone (ALDACTONE) 25 MG tablet, Take 1 tablet by mouth Daily., Disp: 30 tablet, Rfl: 11  Allergies:  No Known Allergies  Social History:   Social History     Tobacco Use   • Smoking status: Former Smoker     Packs/day: 0.50     Years: 15.00     Pack years: 7.50     Types: Cigarettes     Quit date:      Years since quittin.9   • Smokeless tobacco: Former User   • Tobacco comment: quit age 42   Substance Use Topics   • Alcohol use: No      Family History:  Family History   Problem Relation Age of Onset   • Diabetes Father    • Cancer Father         lung   • COPD Mother    • Malig Hyperthermia Neg Hx           Review of Systems  See history of present illness and past medical history.    Constitutional: Remarkable for no fever or chills but does have residual generalized weakness.  Cardiovascular: Remarkable for pressure-like discomfort around the chest and did have palpitations and symptoms as described above  Respiratory: Remarkable for no cough congestion or pleuritic chest pain  GI: Remarkable for nausea vomiting since the event but now it is improved and she had some residual nausea.  Denies any abdominal pain.  : Remarkable for no burning in urination frequency urgency but did have incontinence upon herself as per history.  Musculoskeletal: Remarkable for no new joint aches and pain  Neurological: Remarkable for episode of syncope as described but no focal residual weakness of arm or legs and confusion or disorientation.    Remainder of ROS is negative.      Vitals:   /67   Pulse 68   Temp 98 °F (36.7 °C)   Resp 18   LMP 1997   SpO2 99%   Breastfeeding No   I/O:     Intake/Output Summary (Last 24 hours) at 12/3/2021 2020  Last data filed at 12/3/2021 1915  Gross per 24 hour   Intake 100 ml   Output --   Net 100 ml     Exam:  Patient is examined using the personal protective equipment as per guidelines from  infection control for this particular patient as enacted.  Hand washing was performed before and after patient interaction.  General Appearance:   Alert cooperative nontoxic comfortable appearing female who does not appear to be in any acute distress.   Head:    Normocephalic, without obvious abnormality, atraumatic   Eyes:    PERRL, conjunctiva/corneas clear, EOM's intact, both eyes   Ears:    Normal external ear canals, both ears   Nose:   Nares normal, septum midline, mucosa normal, no drainage    or sinus tenderness   Throat:   Lips, tongue, gums normal; oral mucosa pink and moist   Neck:   Supple, symmetrical, trachea midline, no adenopathy;     thyroid:  no enlargement/tenderness/nodules; no carotid    bruit or JVD   Back:     Symmetric, no curvature, ROM normal, no CVA tenderness   Lungs:     Clear to auscultation bilaterally, respirations unlabored   Chest Wall:    No tenderness or deformity    Heart:   S1-S2 regular   Abdomen:    Soft nontender   Extremities:   Extremities normal, atraumatic, no cyanosis or edema   Pulses:   Pulses palpable in all extremities; symmetric all extremities   Skin:   Skin color normal, Skin is warm and dry,  no rashes or palpable lesions   Neurologic:  Alert and oriented x3 nonfocal neurological examination       Data Review:      I reviewed the patient's new clinical results.  Results from last 7 days   Lab Units 12/03/21  1804   WBC 10*3/mm3 11.52*   HEMOGLOBIN g/dL 12.5   PLATELETS 10*3/mm3 197     Results from last 7 days   Lab Units 12/03/21  1804   SODIUM mmol/L 145   POTASSIUM mmol/L 2.6*   CHLORIDE mmol/L 117*   CO2 mmol/L 19.8*   BUN mg/dL 10   CREATININE mg/dL 0.44*   CALCIUM mg/dL 5.9*   GLUCOSE mg/dL 67     Microbiology Results (last 10 days)     Procedure Component Value - Date/Time    COVID PRE-OP / PRE-PROCEDURE SCREENING ORDER (NO ISOLATION) - Swab, Nasopharynx [[id number]]  (Normal) Collected: 12/03/21 1914    Lab Status: Final result Specimen: Swab from  Nasopharynx Updated: 12/03/21 2131    Narrative:      The following orders were created for panel order COVID PRE-OP / PRE-PROCEDURE SCREENING ORDER (NO ISOLATION) - Swab, Nasopharynx.  Procedure                               Abnormality         Status                     ---------                               -----------         ------                     COVID-19,[facility]...[[id number]]  Normal              Final result                 Please view results for these tests on the individual orders.    COVID-19,[facility] CEPHEID/SARBJIT NP SWAB IN TRANSPORT MEDIA 8-12 HR TAT - Swab, Nasopharynx [[id number]]  (Normal) Collected: 12/03/21 1914    Lab Status: Final result Specimen: Swab from Nasopharynx Updated: 12/03/21 2131     COVID19 Not Detected    Narrative:      Fact sheet for providers: https://www.fda.gov/media/726336/download    Fact sheet for patients: https://www.fda.gov/media/313618/download    Test performed by PCR.        XR Chest 1 View    Result Date: 12/3/2021   1. Stable findings when compared to the prior study dated 02/24/2020.  2. Enlarged cardiomediastinal silhouette.  3. Left subclavian approach pacer device.  4. Chronic elevation of the left hemidiaphragm.  This report was finalized on 12/3/2021 9:04 PM by Dr. aGrry Oh M.D.      ECG 12 Lead   Preliminary Result   HEART RATE= 71  bpm   RR Interval= 844  ms   KS Interval= 168  ms   P Horizontal Axis= 10  deg   P Front Axis= 24  deg   QRSD Interval= 175  ms   QT Interval= 481  ms   QRS Axis= 33  deg   T Wave Axis= -10  deg   - ABNORMAL ECG -   Sinus rhythm   Left bundle branch block   Electronically Signed By:    Date and Time of Study: 2021-12-04 04:00:27      ECG 12 Lead   Final Result   HEART RATE= 66  bpm   RR Interval= 908  ms   KS Interval= 196  ms   P Horizontal Axis= 44  deg   P Front Axis= 46  deg   QRSD Interval= 178  ms   QT Interval= 469  ms   QRS Axis= 27  deg   T Wave Axis= 17  deg   - ABNORMAL ECG -   Sinus rhythm    Left bundle branch block   No change from prior tracing   Electronically Signed By: Saúl Ann) ([facility]) 03-Dec-2021 22:55:15   Date and Time of Study: 2021-12-03 22:43:46      ECG 12 Lead    (Results Pending)       Assessment:  Active Hospital Problems    Diagnosis  POA   • **ICD (implantable cardioverter-defibrillator) discharge [Z45.02]  Not Applicable   • Hypokalemia [E87.6]  Unknown   • Hypomagnesemia [E83.42]  Unknown   • Hypertension [I10]  Unknown   • Ventricular tachycardia (HCC) [I47.2]  Yes   • S/P ICD (internal cardiac defibrillator) procedure [Z95.810]  Yes     CRT-D     • Cardiomyopathy, nonischemic (HCC) [I42.8]  Yes       Medical decision making:  Syncopal episode with a prodrome followed by chest discomfort and reported AICD discharge with history of V. tach and cardiomyopathy-plan is to admit the patient correct her electrolyte abnormalities including magnesium and potassium allow her diet once cleared by cardiology service and follow-up her magnesium and potassium level with correction.  Cardiology service has been consulted.  Check serial troponin.  Device interrogation per cardiology service.  Hypokalemia and hypomagnesemia and hypocalcemia-etiology for this type of electrolyte abnormalities is unclear as patient denies any alcohol use, changes in her diet pattern or taking any new medications or supplements.  Plan is to put her on electrolyte replacement per protocol and check serial magnesium and BMP levels.  Liver disease due to evolving MACIAS as a result of fatty liver disease need to be considered as patient has hypoalbuminemia which is concerning since she claims to be very meticulous about her diet.  Hypertension-continue antihypertensive regimen with close monitoring of her blood pressure.  Nausea and urinary incontinence with vomiting after the syncopal episode-unclear if it can all been explained by syncopal episode or patient may have primary neurological event due to hypoxia.   Watch for any neurological changes and may benefit from neurology evaluation if there are any neurological changes or mental status changes.    Ray Oneill MD   12/3/2021  20:20 EST  Much of this encounter note is an electronic transcription/translation of spoken language to printed text. The electronic translation of spoken language may permit erroneous, or at times, nonsensical words or phrases to be inadvertently transcribed; Although I have reviewed the note for such errors, some may still exist

## 2021-12-04 NOTE — CONSULTS
Patient Name: Tiffani Fiore  Age/Sex: 51 y.o. female  : 1970  MRN: [id number]    Date of Admission: 12/3/2021  Date of Encounter Visit: 21  Encounter Provider: Smith Valentin MD  Place of Service: [facility] CARDIOLOGY      Referring Provider: Ray Oneill MD  Patient Care Team:  Provider, No Known as PCP - General  Provider, No Known as PCP - Family Medicine    Subjective:     Admitted/Consulted for: AICD discharge    Chief Complaint: AICD discharge    History of Present Illness:  51 y.o. female with a history of nonischemic cardiomyopathy with left ventricular ejection fraction of 33%, paroxysmal atrial tachycardia, ventricular tachycardia s/p AICD placement, and pulmonary embolism. She is a patient of Dr. Eubanks.    She presented to the emergency room after she had her AICD fired while she was at work. She said she felt the abnormal sensation in her chest that she gets prior to her AICD firing, and then she woke up on the floor with her coworkers surrounding her. According to her coworkers, she turned blue and jerked. Per patient, her AICD has not discharged in two years. Prior to arrival, patient vomited multiple times and reported having moderate chest pain described as a band squeezing around her chest. Patient believes the discomfort is related to her defibrillator discharging.     EKG in the ER showed patient to be in SR with a LBBB, unchanged from previous. Her labs were unremarkable, with th exception of her troponin which was elevated at 0.118.  She denies any chest pain currently.  Previously with ventricular tachycardia even in 2017 she has had troponin greater than 1 with normal coronaries.    Previous testing:   ECHO 20  Left ventricular systolic function is moderately decreased. Calculated EF = 33%. Estimated EF was in agreement with the calculated EF. The left ventricular cavity is moderately dilated. Left ventricular  diastolic dysfunction is noted (grade I) consistent with impaired relaxation. Septal dyssynchrony is noted.  Mild-to-moderate mitral valve regurgitation is present.  Calculated right ventricular systolic pressure from tricuspid regurgitation is 35.9 mmHg. Mild pulmonary hypertension is present.    Cardiac catheterization 8/30/17  FINDINGS:     LEFT VENTRICULOGRAPHY: The LV pressure was 100/10 .  There was left ventricular dilatation and severely reduced global LV systolic function without segmental wall motion abnormality.  There was no mitral insufficiency or gradient across the aortic valve on pullback.     CORONARY ANGIOGRAPHY:  Angiographically normal coronary arteries in a right dominant fashion     SUMMARY: Nonischemic cardiomyopathy      Past Medical History:  Past Medical History:   Diagnosis Date   • Cardiomyopathy ([facility]) 2012   • Chest pain    • CHF (congestive heart failure) ([facility])    • Clostridium difficile colitis 2013   • Depression    • DVT (deep venous thrombosis) ([facility]) 2012   • Dyspnea    • Fatigue    • History of paroxysmal atrial tachycardia    • History of transfusion    • Hyperlipidemia    • Hypertension    • ICD (implantable cardioverter-defibrillator) in place     LEFT SUBCLAVIAN AREA   • Ischemic cardiomyopathy    • Migraine    • Myocardial infarction ([facility]) 2012   • Obesity due to excess calories    • Overweight (BMI 25.0-29.9)    • Palpitations     ICD   • PONV (postoperative nausea and vomiting)    • Pulmonary embolism ([facility])    • S/P ICD (internal cardiac defibrillator) procedure    • SVT (supraventricular tachycardia) ([facility])    • Tachycardia    • VT (ventricular tachycardia) ([facility])     recurrent       Past Surgical History:   Procedure Laterality Date   • ABLATION OF DYSRHYTHMIC FOCUS  02/2018    Multiple VT ablations, last on at -Dr Levy, epicardial VT 2/2018   • BRONCHOSCOPY N/A 3/8/2018    Procedure: BRONCHOSCOPY WITH BAL;  Surgeon: Berto Nicholson MD;  Location: [facility] ENDOSCOPY;   Service:    • CARDIAC CATHETERIZATION N/A 2017    Procedure: Left Heart Cath;  Surgeon: Saúl Ann MD;  Location: [facility] CATH INVASIVE LOCATION;  Service:    • CARDIAC CATHETERIZATION N/A 2017    Procedure: Left ventriculography;  Surgeon: Saúl Ann MD;  Location: [facility] INVASIVE LOCATION;  Service:    • CARDIAC CATHETERIZATION N/A 2017    Procedure: Coronary angiography;  Surgeon: Saúl Ann MD;  Location: [facility] INVASIVE LOCATION;  Service:    • CARDIAC DEFIBRILLATOR PLACEMENT     • CARDIAC DEFIBRILLATOR PLACEMENT      CONNER DID LEAD REVISION WITH REPLACEMENT   • CARDIAC DEFIBRILLATOR PLACEMENT     • CARDIAC DEFIBRILLATOR PLACEMENT     • CARDIAC ELECTROPHYSIOLOGY PROCEDURE N/A 2017    Procedure: Ablation VT  PATIENT HAS [place] ICD;  Surgeon: Grayson Yancey MD;  Location: [facility] INVASIVE LOCATION;  Service:    •  SECTION     • HYSTERECTOMY     • IMPLANTABLE CARDIOVERTER DEFIBRILLATOR LEAD REPLACEMENT/POCKET REVISION N/A 10/20/2017    Procedure: ICD LEAD EXTRACTION TRANSVENOUS AND PLACEMENT OF RV AND LV LEAD;  Surgeon: Amrit Salgado MD;  Location: [facility] OR ;  Service:    • TUBAL ABDOMINAL LIGATION         Home Medications:   Medications Prior to Admission   Medication Sig Dispense Refill Last Dose   • carvedilol (COREG) 6.25 MG tablet TAKE TWO TABLETS BY MOUTH EVERY MORNING 180 tablet 1 2021 at 1500   • lisinopril (PRINIVIL,ZESTRIL) 20 MG tablet TAKE ONE TABLET BY MOUTH DAILY 90 tablet 1 2021 at 1500   • spironolactone (ALDACTONE) 25 MG tablet Take 1 tablet by mouth Daily. 30 tablet 11 2021 at 1500       Allergies:  No Known Allergies    Past Social History:  Social History     Socioeconomic History   • Marital status: Single   Tobacco Use   • Smoking status: Former Smoker     Packs/day: 0.50     Years: 15.00     Pack years: 7.50     Types: Cigarettes     Quit date:      Years since quittin.9   • Smokeless  "tobacco: Former User   • Tobacco comment: quit age 42   Vaping Use   • Vaping Use: Never used   Substance and Sexual Activity   • Alcohol use: No   • Drug use: No   • Sexual activity: Defer        Past Family History:  Family History   Problem Relation Age of Onset   • Diabetes Father    • Cancer Father         lung   • COPD Mother    • Malig Hyperthermia Neg Hx          Review of Systems:  All systems reviewed. Pertinent positives identified in HPI. All other systems are negative.          Objective:     Objective:  Temp:  [97.9 °F (36.6 °C)-98 °F (36.7 °C)] 98 °F (36.7 °C)  Heart Rate:  [64-84] 75  Resp:  [18] 18  BP: ()/(46-86) 103/58    Intake/Output Summary (Last 24 hours) at 12/4/2021 1016  Last data filed at 12/4/2021 0736  Gross per 24 hour   Intake 300 ml   Output --   Net 300 ml     Body mass index is 33.06 kg/m².      12/03/21 2241 12/04/21  0715   Weight: 89.9 kg (198 lb 3.1 oz) 90 kg (198 lb 6.6 oz)           Physical Exam:   Temp:  [97.9 °F (36.6 °C)-98 °F (36.7 °C)] 98 °F (36.7 °C)  Heart Rate:  [64-84] 75  Resp:  [18] 18  BP: ()/(46-86) 103/58    Intake/Output Summary (Last 24 hours) at 12/4/2021 1016  Last data filed at 12/4/2021 0736  Gross per 24 hour   Intake 300 ml   Output --   Net 300 ml     Flowsheet Rows      First Filed Value   Admission Height 165.1 cm (65\") Documented at 12/03/2021 2241   Admission Weight 89.9 kg (198 lb 3.1 oz) Documented at 12/03/2021 2241          General Appearance:    Alert, cooperative, in no acute distress   Head:    Normocephalic, without obvious abnormality, atraumatic       Neck/Lymph   No adenopathy, supple, no thyromegaly, no carotid bruit, no    JVD   Lungs:     Clear to auscultation bilaterally, no wheezes, rales, or     rhonchi    Cardiac:    Normal rate, regular rhythm, no murmur, no rub, no gallop   Chest Wall:    No abnormalities observed   GI:     Normal bowel sounds, soft, nontender, nondistended,            no rebound tenderness "   Extremities:   No cyanosis, clubbing, or edema   Circulatory/Peripheral Vascular :   Pulses palpable and equal bilaterally   Integumentary:   No bleeding or rash. Normal temperature       Neurologic:   Cranial nerves 2 - 12 grossly intact, sensation intact               Lab Review:     Results from last 7 days   Lab Units 12/04/21  0501 12/03/21  1804 12/03/21  1804   SODIUM mmol/L 137  --  145   POTASSIUM mmol/L 5.0  --  2.6*   CHLORIDE mmol/L 102  --  117*   CO2 mmol/L 23.7  --  19.8*   BUN mg/dL 10  --  10   CREATININE mg/dL 0.59  --  0.44*   GLUCOSE mg/dL 97   < > 67   CALCIUM mg/dL 8.6  --  5.9*    < > = values in this interval not displayed.       Results from last 7 days   Lab Units 12/04/21  0501 12/03/21  1804   TROPONIN T ng/mL 0.118* 0.013     Results from last 7 days   Lab Units 12/04/21  0501   WBC 10*3/mm3 10.53   HEMOGLOBIN g/dL 12.4   HEMATOCRIT % 36.2   PLATELETS 10*3/mm3 202             Results from last 7 days   Lab Units 12/04/21  0501   MAGNESIUM mg/dL 2.4                       Imaging:    Imaging Results (Most Recent)     Procedure Component Value Units Date/Time    XR Chest 1 View [[id number]] Collected: 12/03/21 2047     Updated: 12/03/21 2107    Narrative:      STAT PORTABLE RADIOGRAPHIC VIEW THE CHEST     CLINICAL HISTORY: Shortness of air.     FINDINGS:      There is a left subclavian approach pacer device with its leads  unchanged in position when compared to the prior study dated 02/24/2020.  There is elevation of the left hemidiaphragm which was also seen on the  prior exam. The cardiomediastinal silhouette is enlarged but stable in  size. The lungs are clear of acute infiltrates. The osseous structures  are unremarkable.       Impression:         1. Stable findings when compared to the prior study dated 02/24/2020.     2. Enlarged cardiomediastinal silhouette.     3. Left subclavian approach pacer device.     4. Chronic elevation of the left hemidiaphragm.     This report was  finalized on 12/3/2021 9:04 PM by Dr. Garry Oh M.D.             Results for orders placed during the hospital encounter of 02/24/20    Adult Transthoracic Echo Complete W/ Cont if Necessary Per Protocol    Interpretation Summary  · Left ventricular systolic function is moderately decreased. Calculated EF = 33%. Estimated EF was in agreement with the calculated EF. The left ventricular cavity is moderately dilated. Left ventricular diastolic dysfunction is noted (grade I) consistent with impaired relaxation. Septal dyssynchrony is noted.  · Mild-to-moderate mitral valve regurgitation is present.  · Calculated right ventricular systolic pressure from tricuspid regurgitation is 35.9 mmHg. Mild pulmonary hypertension is present.      EKG:       BASELINE:       I personally viewed and interpreted the patient's EKG/Telemetry data.    Assessment:   Assessment/Plan   1.  Nonischemic cardiomyopathy  2.  Ventricular tachycardia  3.  Essential hypertension  4.  ICD discharge  5.  Abnormal labs    Plan:   -Patient had ventricular tachycardia requiring defibrillation.  She has a mildly elevated troponin but is chest pain-free.  This is likely secondary to the V. tach and her shock.  I will cycle her enzymes to ensure downtrending.  That being said she has had a troponin greater than 1 in 2017 with normal coronary arteries.  I do not think repeat coronary angiography will be indicated.  -She is on amiodarone drip and I will discuss with the electrophysiology team.  My suggestion at least today would be to load her with p.o. amiodarone and leave the amiodarone drip on.  We may be able to stop this tomorrow or Monday morning to let her get out of here.  -I think that her lab work in the ER is inaccurate.  If you look through that her calcium is significantly off along with her albumin in addition to her potassium.  After giving her potassium her potassium high at 5.  I think it is very likely that her labs in the ER should  have been repeated and would have been normal.  As such I do not think hypokalemia has significantly contributed to her ventricular tachycardia.        Thank you for allowing me to participate in the care of Tiffani Fiore. Feel free to contact me directly with any further questions or concerns.    Smith Valentin MD  [facility]  12/04/21  10:16 EST

## 2021-12-04 NOTE — PROGRESS NOTES
Name: Tiffani Fiore ADMIT: 12/3/2021   : 1970  PCP: Provider, No Known    MRN: [id number] LOS: 1 days   AGE/SEX: 51 y.o. female  ROOM: /     Subjective   Subjective   No new complaints. She has still sore from CPR.    Review of Systems   Constitutional: Negative for fever.   Respiratory: Negative for cough and shortness of breath.    Cardiovascular: Negative for leg swelling.   Gastrointestinal: Negative for abdominal pain.      Objective   Objective   Vital Signs  Temp:  [97.9 °F (36.6 °C)-98.6 °F (37 °C)] 98.6 °F (37 °C)  Heart Rate:  [64-84] 71  Resp:  [16-18] 16  BP: ()/(46-86) 88/57  SpO2:  [92 %-100 %] 97 %  on   ;   Device (Oxygen Therapy): room air  Body mass index is 33.06 kg/m².    Physical Exam  Constitutional:       General: She is not in acute distress.     Appearance: Normal appearance. She is not toxic-appearing.   HENT:      Head: Normocephalic and atraumatic.   Cardiovascular:      Rate and Rhythm: Normal rate and regular rhythm.   Pulmonary:      Effort: Pulmonary effort is normal. No respiratory distress.      Breath sounds: Normal breath sounds. No wheezing or rhonchi.   Abdominal:      General: Bowel sounds are normal. There is no distension.      Palpations: Abdomen is soft.      Tenderness: There is no abdominal tenderness. There is no guarding or rebound.   Musculoskeletal:         General: No swelling.      Right lower leg: No edema.      Left lower leg: No edema.   Skin:     General: Skin is warm and dry.   Neurological:      General: No focal deficit present.      Mental Status: She is alert and oriented to person, place, and time.   Psychiatric:         Mood and Affect: Mood normal.         Behavior: Behavior normal.     Results Review  I reviewed the patient's new clinical results.  Results from last 7 days   Lab Units 21  0501 21  1804   WBC 10*3/mm3 10.53 11.52*   HEMOGLOBIN g/dL 12.4 12.5   PLATELETS 10*3/mm3 202 197     Results from last 7 days    Lab Units 12/04/21  1101 12/04/21  0501 12/03/21  1804   SODIUM mmol/L 137 137 145   POTASSIUM mmol/L 5.0 5.0 2.6*   CHLORIDE mmol/L 104 102 117*   CO2 mmol/L 24.5 23.7 19.8*   BUN mg/dL 10 10 10   CREATININE mg/dL 0.73 0.59 0.44*   GLUCOSE mg/dL 97 97 67     Lab Results   Component Value Date    ANIONGAP 8.5 12/04/2021     Estimated Creatinine Clearance: 100.9 mL/min (by C-G formula based on SCr of 0.73 mg/dL).    Results from last 7 days   Lab Units 12/04/21  0501 12/03/21  1804   ALBUMIN g/dL 4.30 3.00*   BILIRUBIN mg/dL 0.3 0.3   ALK PHOS U/L 99 68   AST (SGOT) U/L 20 19   ALT (SGPT) U/L 21 16     Results from last 7 days   Lab Units 12/04/21  1101 12/04/21  0501 12/03/21  1804   CALCIUM mg/dL 9.1 8.6 5.9*   ALBUMIN g/dL  --  4.30 3.00*   MAGNESIUM mg/dL  --  2.4 1.5*       Glucose   Date/Time Value Ref Range Status   12/04/2021 0643 110 70 - 130 mg/dL Final     Comment:     Meter: [id number] : [id number] Zain Pandya AARON   12/03/2021 2342 115 70 - 130 mg/dL Final     Comment:     Meter: [id number] : [id number] Efrain Contreras RN       Scheduled Meds  amiodarone, 400 mg, Oral, Q8H  carvedilol, 12.5 mg, Oral, QAM  enoxaparin, 40 mg, Subcutaneous, Q24H  lisinopril, 20 mg, Oral, Daily  sodium chloride, 10 mL, Intravenous, Q12H  spironolactone, 25 mg, Oral, Daily    Continuous Infusions  amiodarone, 0.5 mg/min, Last Rate: 0.5 mg/min (12/04/21 1112)    PRN Meds  [START ON 12/5/2021] influenza vaccine  •  magnesium sulfate **OR** magnesium sulfate **OR** magnesium sulfate  •  nitroglycerin  •  ondansetron  •  potassium chloride **OR** potassium chloride **OR** potassium chloride  •  potassium chloride  •  [COMPLETED] Insert peripheral IV **AND** sodium chloride  •  sodium chloride    amiodarone, 0.5 mg/min, Last Rate: 0.5 mg/min (12/04/21 1112)    Diet  Diet Regular     I have personally reviewed:  [x]  Laboratory   [x]  Microbiology   [x]  Radiology   [x]  EKG/Telemetry   [x]  Cardiology/Vascular   []   Pathology    [x]  Some old records       Assessment/Plan     Active Hospital Problems    Diagnosis  POA   • **ICD (implantable cardioverter-defibrillator) discharge [Z45.02]  Not Applicable   • Hypokalemia [E87.6]  Unknown   • Hypomagnesemia [E83.42]  Unknown   • AICD discharge [Z45.02]  Not Applicable   • Hypertension [I10]  Unknown   • Ventricular tachycardia (HCC) [I47.2]  Yes   • S/P ICD (internal cardiac defibrillator) procedure [Z95.810]  Yes   • Cardiomyopathy, nonischemic (HCC) [I42.8]  Yes      Resolved Hospital Problems   No resolved problems to display.     51 y.o. female admitted with ICD (implantable cardioverter-defibrillator) discharge. History of nonischemic cardiomyopathy    · Amiodarone per cardiology. EP evaluation  · Elevated troponin from V. tach and shock   · K and Mag normal continue to monitor  · Lovenox 40 mg SC daily for DVT prophylaxis  · Discussed with patient  · Discharge: BRAULIO Clancy MD  [facility]  12/04/21  15:08 EST    I wore protective equipment throughout this patient encounter including a face mask, gloves, and protective eyewear.  Hand hygiene was performed before donning protective equipment and after removal when leaving the room.

## 2021-12-04 NOTE — ED NOTES
Nursing report ED to floor  Tiffani Fiore  51 y.o.  female    HPI :   Chief Complaint   Patient presents with   • Syncope       Admitting doctor:   Ray Oneill MD    Admitting diagnosis:   The primary encounter diagnosis was Hypokalemia. Diagnoses of Hypomagnesemia, AICD discharge, Chest pain, unspecified type, and Hypocalcemia were also pertinent to this visit.    Code status:   Current Code Status     Date Active Code Status Order ID Comments User Context       Prior    Advance Care Planning Activity          Allergies:   Patient has no known allergies.    Intake and Output    Intake/Output Summary (Last 24 hours) at 12/3/2021 2205  Last data filed at 12/3/2021 2154  Gross per 24 hour   Intake 300 ml   Output --   Net 300 ml       Weight:   There were no vitals filed for this visit.    Most recent vitals:   Vitals:    12/03/21 1852 12/03/21 1931 12/03/21 2031 12/03/21 2131   BP: 99/58 110/67 110/86 116/77   Pulse: 67 68 65 64   Resp:       Temp:       SpO2: 98% 99% 100% 94%       Active LDAs/IV Access:   Lines, Drains & Airways     Active LDAs     Name Placement date Placement time Site Days    Peripheral IV 12/03/21 1805 Left Antecubital 12/03/21 1805  Antecubital  less than 1    Peripheral IV 12/03/21 1913 Right Antecubital 12/03/21 1913  Antecubital  less than 1    Peripheral IV 12/03/21 1914 Right; Anterior Wrist 12/03/21 1914  Wrist  less than 1                Labs (abnormal labs have a star):   Labs Reviewed   COMPREHENSIVE METABOLIC PANEL - Abnormal; Notable for the following components:       Result Value    Creatinine 0.44 (*)     Potassium 2.6 (*)     Chloride 117 (*)     CO2 19.8 (*)     Calcium 5.9 (*)     Total Protein 4.8 (*)     Albumin 3.00 (*)     All other components within normal limits    Narrative:     GFR Normal >60  Chronic Kidney Disease <60  Kidney Failure <15     MAGNESIUM - Abnormal; Notable for the following components:    Magnesium 1.5 (*)     All other components within  normal limits   CBC WITH AUTO DIFFERENTIAL - Abnormal; Notable for the following components:    WBC 11.52 (*)     RDW 11.7 (*)     Eosinophil % 0.0 (*)     Immature Grans % 1.3 (*)     Neutrophils, Absolute 8.18 (*)     Immature Grans, Absolute 0.15 (*)     All other components within normal limits   COVID-19,[facility] IN-HOUSE CEPHEID/SARBJIT, NP SWAB IN TRANSPORT MEDIA 8-12 HR TAT - Normal    Narrative:     Fact sheet for providers: https://www.fda.gov/media/586290/download    Fact sheet for patients: https://www.fda.gov/media/350217/download    Test performed by PCR.   TROPONIN (IN-HOUSE) - Normal    Narrative:     Troponin T Reference Range:  <= 0.03 ng/mL-   Negative for AMI  >0.03 ng/mL-     Abnormal for myocardial necrosis.  Clinicians would have to utilize clinical acumen, EKG, Troponin and serial changes to determine if it is an Acute Myocardial Infarction or myocardial injury due to an underlying chronic condition.       Results may be falsely decreased if patient taking Biotin.     LIPASE - Normal   COVID PRE-OP / PRE-PROCEDURE SCREENING ORDER (NO ISOLATION)    Narrative:     The following orders were created for panel order COVID PRE-OP / PRE-PROCEDURE SCREENING ORDER (NO ISOLATION) - Swab, Nasopharynx.  Procedure                               Abnormality         Status                     ---------                               -----------         ------                     COVID-19,[facility] IN-HOUSE...[[id number]]  Normal              Final result                 Please view results for these tests on the individual orders.   CBC AND DIFFERENTIAL    Narrative:     The following orders were created for panel order CBC & Differential.  Procedure                               Abnormality         Status                     ---------                               -----------         ------                     CBC Auto Differential[[id number]]        Abnormal            Final result                 Please view results  for these tests on the individual orders.       EKG:   ECG 12 Lead    (Results Pending)   ECG 12 Lead    (Results Pending)       Meds given in ED:   Medications   sodium chloride 0.9 % flush 10 mL (has no administration in time range)   amiodarone in dextrose 5% (NEXTERONE) loading dose 150mg/100mL (0 mg Intravenous Stopped 12/3/21 1915)     Followed by   amiodarone 360 mg in 200 mL D5W infusion (1 mg/min Intravenous Currently Infusing 12/3/21 2153)     Followed by   amiodarone 360 mg in 200 mL D5W infusion (has no administration in time range)   potassium chloride 10 mEq in 100 mL IVPB (10 mEq Intravenous New Bag 12/3/21 2153)   metoprolol tartrate (LOPRESSOR) tablet 12.5 mg (12.5 mg Oral Given 12/3/21 1842)   magnesium sulfate 2g/50 mL (PREMIX) infusion (0 g Intravenous Stopped 12/3/21 2110)   potassium chloride (K-DUR,KLOR-CON) ER tablet 40 mEq (40 mEq Oral Given 12/3/21 1921)   ondansetron (ZOFRAN) injection 4 mg (4 mg Intravenous Given 12/3/21 1921)   morphine injection 2 mg (2 mg Intravenous Given 12/3/21 1921)   calcium gluconate 1g/50ml 0.675% NaCl IV SOLN (0 g Intravenous Stopped 12/3/21 2154)   lidocaine (XYLOCAINE) 1 % injection 10 mL (10 mL Injection Given 12/3/21 2153)       Imaging results:  XR Chest 1 View    Result Date: 12/3/2021   1. Stable findings when compared to the prior study dated 2020.  2. Enlarged cardiomediastinal silhouette.  3. Left subclavian approach pacer device.  4. Chronic elevation of the left hemidiaphragm.  This report was finalized on 12/3/2021 9:04 PM by Dr. Garry Oh M.D.        Ambulatory status:   - x 2    Social issues:   Social History     Socioeconomic History   • Marital status: Single   Tobacco Use   • Smoking status: Former Smoker     Packs/day: 0.50     Years: 15.00     Pack years: 7.50     Types: Cigarettes     Quit date:      Years since quittin.9   • Smokeless tobacco: Former User   • Tobacco comment: quit age 42   Vaping Use   • Vaping Use:  Never used   Substance and Sexual Activity   • Alcohol use: No   • Drug use: No   • Sexual activity: Defer       NIH Stroke Scale:        Nursing report ED to floor:       Yumiko Chong RN  12/03/21 7711

## 2021-12-05 LAB
ANION GAP SERPL CALCULATED.3IONS-SCNC: 11 MMOL/L (ref 5–15)
ANION GAP SERPL CALCULATED.3IONS-SCNC: 12.9 MMOL/L (ref 5–15)
ANION GAP SERPL CALCULATED.3IONS-SCNC: 6.7 MMOL/L (ref 5–15)
ANION GAP SERPL CALCULATED.3IONS-SCNC: 8.8 MMOL/L (ref 5–15)
BUN SERPL-MCNC: 12 MG/DL (ref 6–20)
BUN SERPL-MCNC: 14 MG/DL (ref 6–20)
BUN/CREAT SERPL: 13.8 (ref 7–25)
BUN/CREAT SERPL: 14.6 (ref 7–25)
BUN/CREAT SERPL: 15.9 (ref 7–25)
BUN/CREAT SERPL: 16.4 (ref 7–25)
CALCIUM SPEC-SCNC: 8.5 MG/DL (ref 8.6–10.5)
CALCIUM SPEC-SCNC: 8.8 MG/DL (ref 8.6–10.5)
CALCIUM SPEC-SCNC: 8.9 MG/DL (ref 8.6–10.5)
CALCIUM SPEC-SCNC: 9.1 MG/DL (ref 8.6–10.5)
CHLORIDE SERPL-SCNC: 101 MMOL/L (ref 98–107)
CHLORIDE SERPL-SCNC: 102 MMOL/L (ref 98–107)
CHLORIDE SERPL-SCNC: 102 MMOL/L (ref 98–107)
CHLORIDE SERPL-SCNC: 104 MMOL/L (ref 98–107)
CO2 SERPL-SCNC: 23 MMOL/L (ref 22–29)
CO2 SERPL-SCNC: 23.1 MMOL/L (ref 22–29)
CO2 SERPL-SCNC: 24.2 MMOL/L (ref 22–29)
CO2 SERPL-SCNC: 25.3 MMOL/L (ref 22–29)
CREAT SERPL-MCNC: 0.73 MG/DL (ref 0.57–1)
CREAT SERPL-MCNC: 0.82 MG/DL (ref 0.57–1)
CREAT SERPL-MCNC: 0.87 MG/DL (ref 0.57–1)
CREAT SERPL-MCNC: 0.88 MG/DL (ref 0.57–1)
GFR SERPL CREATININE-BSD FRML MDRD: 68 ML/MIN/1.73
GFR SERPL CREATININE-BSD FRML MDRD: 69 ML/MIN/1.73
GFR SERPL CREATININE-BSD FRML MDRD: 73 ML/MIN/1.73
GFR SERPL CREATININE-BSD FRML MDRD: 84 ML/MIN/1.73
GLUCOSE SERPL-MCNC: 110 MG/DL (ref 65–99)
GLUCOSE SERPL-MCNC: 127 MG/DL (ref 65–99)
GLUCOSE SERPL-MCNC: 92 MG/DL (ref 65–99)
GLUCOSE SERPL-MCNC: 99 MG/DL (ref 65–99)
POTASSIUM SERPL-SCNC: 4 MMOL/L (ref 3.5–5.2)
POTASSIUM SERPL-SCNC: 4.2 MMOL/L (ref 3.5–5.2)
POTASSIUM SERPL-SCNC: 4.4 MMOL/L (ref 3.5–5.2)
POTASSIUM SERPL-SCNC: 5.1 MMOL/L (ref 3.5–5.2)
QT INTERVAL: 472 MS
SODIUM SERPL-SCNC: 133 MMOL/L (ref 136–145)
SODIUM SERPL-SCNC: 135 MMOL/L (ref 136–145)
SODIUM SERPL-SCNC: 138 MMOL/L (ref 136–145)
SODIUM SERPL-SCNC: 138 MMOL/L (ref 136–145)

## 2021-12-05 PROCEDURE — 25010000002 AMIODARONE IN DEXTROSE 5% 360-4.14 MG/200ML-% SOLUTION: Performed by: INTERNAL MEDICINE

## 2021-12-05 PROCEDURE — 80048 BASIC METABOLIC PNL TOTAL CA: CPT | Performed by: INTERNAL MEDICINE

## 2021-12-05 PROCEDURE — 99232 SBSQ HOSP IP/OBS MODERATE 35: CPT | Performed by: INTERNAL MEDICINE

## 2021-12-05 PROCEDURE — 25010000002 ENOXAPARIN PER 10 MG: Performed by: INTERNAL MEDICINE

## 2021-12-05 PROCEDURE — 93005 ELECTROCARDIOGRAM TRACING: CPT | Performed by: INTERNAL MEDICINE

## 2021-12-05 RX ADMIN — AMIODARONE HYDROCHLORIDE 400 MG: 200 TABLET ORAL at 10:37

## 2021-12-05 RX ADMIN — SODIUM CHLORIDE, PRESERVATIVE FREE 10 ML: 5 INJECTION INTRAVENOUS at 21:31

## 2021-12-05 RX ADMIN — AMIODARONE HYDROCHLORIDE 400 MG: 200 TABLET ORAL at 03:55

## 2021-12-05 RX ADMIN — CARVEDILOL 12.5 MG: 12.5 TABLET, FILM COATED ORAL at 08:08

## 2021-12-05 RX ADMIN — AMIODARONE HYDROCHLORIDE 400 MG: 200 TABLET ORAL at 18:32

## 2021-12-05 RX ADMIN — ENOXAPARIN SODIUM 40 MG: 40 INJECTION SUBCUTANEOUS at 08:08

## 2021-12-05 RX ADMIN — SPIRONOLACTONE 25 MG: 25 TABLET, FILM COATED ORAL at 08:08

## 2021-12-05 RX ADMIN — AMIODARONE HYDROCHLORIDE 0.5 MG/MIN: 1.8 INJECTION, SOLUTION INTRAVENOUS at 08:08

## 2021-12-05 RX ADMIN — SODIUM CHLORIDE, PRESERVATIVE FREE 10 ML: 5 INJECTION INTRAVENOUS at 08:09

## 2021-12-05 NOTE — PROGRESS NOTES
"Patient Care Team:  Provider, No Known as PCP - General  Provider, No Known as PCP - Family Medicine    Chief Complaint: Follow-up VT.    Interval History:   Resting comfortably.  No new complaints.  Chest soreness.    Objective   Vital Signs  Temp:  [97.7 °F (36.5 °C)-98.8 °F (37.1 °C)] 97.7 °F (36.5 °C)  Heart Rate:  [64-75] 64  Resp:  [16-18] 18  BP: ()/(55-67) 105/61    Intake/Output Summary (Last 24 hours) at 12/5/2021 1025  Last data filed at 12/5/2021 0800  Gross per 24 hour   Intake 360 ml   Output --   Net 360 ml     Flowsheet Rows      First Filed Value   Admission Height 165.1 cm (65\") Documented at 12/03/2021 2241   Admission Weight 89.9 kg (198 lb 3.1 oz) Documented at 12/03/2021 2241          Temp:  [97.7 °F (36.5 °C)-98.8 °F (37.1 °C)] 97.7 °F (36.5 °C)  Heart Rate:  [64-75] 64  Resp:  [16-18] 18  BP: ()/(55-67) 105/61    Intake/Output Summary (Last 24 hours) at 12/5/2021 1025  Last data filed at 12/5/2021 0800  Gross per 24 hour   Intake 360 ml   Output --   Net 360 ml     Flowsheet Rows      First Filed Value   Admission Height 165.1 cm (65\") Documented at 12/03/2021 2241   Admission Weight 89.9 kg (198 lb 3.1 oz) Documented at 12/03/2021 2241          General Appearance:    Alert, cooperative, in no acute distress   Head:    Normocephalic, without obvious abnormality, atraumatic       Neck/Lymph   No adenopathy, supple, no thyromegaly, no carotid bruit, no    JVD   Lungs:     Clear to auscultation bilaterally, no wheezes, rales, or     rhonchi    Cardiac:    Normal rate, regular rhythm, no murmur, no rub, no gallop   Chest Wall:    No abnormalities observed   GI:     Normal bowel sounds, soft, nontender, nondistended,            no rebound tenderness   Extremities:   No cyanosis, clubbing, or edema   Circulatory/Peripheral Vascular :   Pulses palpable and equal bilaterally   Integumentary:   No bleeding or rash. Normal temperature       Neurologic:   Cranial nerves 2 - 12 grossly " intact, sensation intact              amiodarone, 400 mg, Oral, Q8H  carvedilol, 12.5 mg, Oral, QAM  enoxaparin, 40 mg, Subcutaneous, Q24H  lisinopril, 20 mg, Oral, Daily  sodium chloride, 10 mL, Intravenous, Q12H  spironolactone, 25 mg, Oral, Daily        amiodarone, 0.5 mg/min, Last Rate: 0.5 mg/min (12/05/21 0808)        Results Review:    Results from last 7 days   Lab Units 12/05/21  0725   SODIUM mmol/L 133*   POTASSIUM mmol/L 4.4   CHLORIDE mmol/L 101   CO2 mmol/L 25.3   BUN mg/dL 12   CREATININE mg/dL 0.87   GLUCOSE mg/dL 127*   CALCIUM mg/dL 9.1     Results from last 7 days   Lab Units 12/04/21  1101 12/04/21  0501 12/03/21  1804   TROPONIN T ng/mL 0.086* 0.118* 0.013     Results from last 7 days   Lab Units 12/04/21  0501   WBC 10*3/mm3 10.53   HEMOGLOBIN g/dL 12.4   HEMATOCRIT % 36.2   PLATELETS 10*3/mm3 202             Results from last 7 days   Lab Units 12/04/21  0501   MAGNESIUM mg/dL 2.4         @LABRCNT(bnp)@  I reviewed the patient's new clinical results.  I personally viewed and interpreted the patient's EKG/Telemetry data        Assessment/Plan   1.  Nonischemic cardiomyopathy  2.  Ventricular tachycardia: No additional episodes  3.  Essential hypertension: Controlled  4.  ICD discharge  5.  Abnormal labs: Currently normal     Plan:   -Patient had ventricular tachycardia requiring defibrillation.  She has a mildly elevated troponin but is chest pain-free.  This is likely secondary to the V. tach and her shock.  I will cycle her enzymes to ensure downtrending.  That being said she has had a troponin greater than 1 in 2017 with normal coronary arteries.  I do not think repeat coronary angiography will be indicated.  -Troponin downtrending.  -I have had a discussion with the patient's electrophysiologist and he has recommended keeping her over the weekend and continuing loading her with p.o. amiodarone.  I will continue every 8 hour dosing.  IV amiodarone will be discontinued  today.

## 2021-12-05 NOTE — PROGRESS NOTES
Name: Tiffani Fiore ADMIT: 12/3/2021   : 1970  PCP: Provider, No Known    MRN: [id number] LOS: 2 days   AGE/SEX: 51 y.o. female  ROOM: [facility]/     Subjective   Subjective   No new complaints.     Review of Systems   Constitutional: Negative for fever.   Respiratory: Negative for cough and shortness of breath.    Cardiovascular: Negative for leg swelling.   Gastrointestinal: Negative for abdominal pain.      Objective   Objective   Vital Signs  Temp:  [97.4 °F (36.3 °C)-98.8 °F (37.1 °C)] 97.4 °F (36.3 °C)  Heart Rate:  [64-75] 69  Resp:  [18] 18  BP: ()/(55-67) 92/56  SpO2:  [91 %-95 %] 94 %  on   ;   Device (Oxygen Therapy): room air  Body mass index is 33.06 kg/m².    Physical Exam  Constitutional:       General: She is not in acute distress.     Appearance: Normal appearance. She is not toxic-appearing.   HENT:      Head: Normocephalic and atraumatic.   Cardiovascular:      Rate and Rhythm: Normal rate and regular rhythm.   Pulmonary:      Effort: Pulmonary effort is normal. No respiratory distress.      Breath sounds: Normal breath sounds. No wheezing or rhonchi.   Abdominal:      General: Bowel sounds are normal. There is no distension.      Palpations: Abdomen is soft.      Tenderness: There is no abdominal tenderness. There is no guarding or rebound.   Musculoskeletal:         General: No swelling.      Right lower leg: No edema.      Left lower leg: No edema.   Skin:     General: Skin is warm and dry.   Neurological:      General: No focal deficit present.      Mental Status: She is alert and oriented to person, place, and time.   Psychiatric:         Mood and Affect: Mood normal.         Behavior: Behavior normal.     Results Review  I reviewed the patient's new clinical results.  Results from last 7 days   Lab Units 21  0501 21  1804   WBC 10*3/mm3 10.53 11.52*   HEMOGLOBIN g/dL 12.4 12.5   PLATELETS 10*3/mm3 202 197     Results from last 7 days   Lab Units 21  0725  12/05/21  0447 12/04/21  1815 12/04/21  1101   SODIUM mmol/L 133* 138 138 137   POTASSIUM mmol/L 4.4 4.2 4.5 5.0   CHLORIDE mmol/L 101 104 104 104   CO2 mmol/L 25.3 23.0 24.2 24.5   BUN mg/dL 12 12 12 10   CREATININE mg/dL 0.87 0.82 0.92 0.73   GLUCOSE mg/dL 127* 99 118* 97     Lab Results   Component Value Date    ANIONGAP 6.7 12/05/2021     Estimated Creatinine Clearance: 84.7 mL/min (by C-G formula based on SCr of 0.87 mg/dL).    Results from last 7 days   Lab Units 12/04/21  0501 12/03/21  1804   ALBUMIN g/dL 4.30 3.00*   BILIRUBIN mg/dL 0.3 0.3   ALK PHOS U/L 99 68   AST (SGOT) U/L 20 19   ALT (SGPT) U/L 21 16     Results from last 7 days   Lab Units 12/05/21  0725 12/05/21  0447 12/04/21  1815 12/04/21  1101 12/04/21  0501 12/04/21  0501 12/03/21  1804 12/03/21  1804   CALCIUM mg/dL 9.1 8.9 8.9 9.1   < > 8.6   < > 5.9*   ALBUMIN g/dL  --   --   --   --   --  4.30  --  3.00*   MAGNESIUM mg/dL  --   --   --   --   --  2.4  --  1.5*    < > = values in this interval not displayed.       Glucose   Date/Time Value Ref Range Status   12/04/2021 0643 110 70 - 130 mg/dL Final     Comment:     Meter: [id number] : [id number] Zain Pandya NA   12/03/2021 2342 115 70 - 130 mg/dL Final     Comment:     Meter: [id number] : [id number] Efrain Contreras RN       Scheduled Meds  amiodarone, 400 mg, Oral, Q8H  carvedilol, 12.5 mg, Oral, QAM  enoxaparin, 40 mg, Subcutaneous, Q24H  lisinopril, 20 mg, Oral, Daily  sodium chloride, 10 mL, Intravenous, Q12H  spironolactone, 25 mg, Oral, Daily    Continuous Infusions   PRN Meds  •  acetaminophen  •  influenza vaccine  •  magnesium sulfate **OR** magnesium sulfate **OR** magnesium sulfate  •  nitroglycerin  •  ondansetron  •  potassium chloride **OR** potassium chloride **OR** potassium chloride  •  potassium chloride  •  [COMPLETED] Insert peripheral IV **AND** sodium chloride  •  sodium chloride     Diet  Diet Regular       Lab Results   Lab Value Date/Time    TROPONINT 0.086  (C) 12/04/2021 1101    TROPONINT 0.118 (C) 12/04/2021 0501    TROPONINT 0.013 12/03/2021 1804        Assessment/Plan     Active Hospital Problems    Diagnosis  POA   • **ICD (implantable cardioverter-defibrillator) discharge [Z45.02]  Not Applicable   • Hypokalemia [E87.6]  Unknown   • Hypomagnesemia [E83.42]  Unknown   • AICD discharge [Z45.02]  Not Applicable   • Hypertension [I10]  Unknown   • Ventricular tachycardia (HCC) [I47.2]  Yes   • S/P ICD (internal cardiac defibrillator) procedure [Z95.810]  Yes   • Cardiomyopathy, nonischemic (HCC) [I42.8]  Yes      Resolved Hospital Problems   No resolved problems to display.     51 y.o. female admitted with ICD (implantable cardioverter-defibrillator) discharge. History of nonischemic cardiomyopathy    · Amiodarone per cardiology. EP evaluation  · Elevated troponin from V. tach and shock  · K and Mag normal continue to monitor  · Lovenox 40 mg SC daily for DVT prophylaxis  · Discussed with patient  · Discharge: BRAULIO Clancy MD  [facility]  12/05/21  14:17 EST    I wore protective equipment throughout this patient encounter including a face mask, gloves, and protective eyewear.  Hand hygiene was performed before donning protective equipment and after removal when leaving the room.

## 2021-12-06 ENCOUNTER — TELEPHONE (OUTPATIENT)
Dept: CARDIOLOGY | Facility: CLINIC | Age: 51
End: 2021-12-06

## 2021-12-06 VITALS
HEIGHT: 65 IN | OXYGEN SATURATION: 95 % | TEMPERATURE: 98.3 F | RESPIRATION RATE: 16 BRPM | HEART RATE: 80 BPM | BODY MASS INDEX: 33.06 KG/M2 | DIASTOLIC BLOOD PRESSURE: 53 MMHG | WEIGHT: 198.41 LBS | SYSTOLIC BLOOD PRESSURE: 93 MMHG

## 2021-12-06 LAB
ANION GAP SERPL CALCULATED.3IONS-SCNC: 10.9 MMOL/L (ref 5–15)
ANION GAP SERPL CALCULATED.3IONS-SCNC: 12 MMOL/L (ref 5–15)
BUN SERPL-MCNC: 13 MG/DL (ref 6–20)
BUN SERPL-MCNC: 15 MG/DL (ref 6–20)
BUN/CREAT SERPL: 17.3 (ref 7–25)
BUN/CREAT SERPL: 17.6 (ref 7–25)
CALCIUM SPEC-SCNC: 8.6 MG/DL (ref 8.6–10.5)
CALCIUM SPEC-SCNC: 9.1 MG/DL (ref 8.6–10.5)
CHLORIDE SERPL-SCNC: 100 MMOL/L (ref 98–107)
CHLORIDE SERPL-SCNC: 102 MMOL/L (ref 98–107)
CO2 SERPL-SCNC: 23.1 MMOL/L (ref 22–29)
CO2 SERPL-SCNC: 24 MMOL/L (ref 22–29)
CREAT SERPL-MCNC: 0.75 MG/DL (ref 0.57–1)
CREAT SERPL-MCNC: 0.85 MG/DL (ref 0.57–1)
GFR SERPL CREATININE-BSD FRML MDRD: 71 ML/MIN/1.73
GFR SERPL CREATININE-BSD FRML MDRD: 81 ML/MIN/1.73
GLUCOSE SERPL-MCNC: 139 MG/DL (ref 65–99)
GLUCOSE SERPL-MCNC: 96 MG/DL (ref 65–99)
MAGNESIUM SERPL-MCNC: 2 MG/DL (ref 1.6–2.6)
POTASSIUM SERPL-SCNC: 4.1 MMOL/L (ref 3.5–5.2)
POTASSIUM SERPL-SCNC: 4.1 MMOL/L (ref 3.5–5.2)
QT INTERVAL: 476 MS
SODIUM SERPL-SCNC: 136 MMOL/L (ref 136–145)
SODIUM SERPL-SCNC: 136 MMOL/L (ref 136–145)
TSH SERPL DL<=0.05 MIU/L-ACNC: 1.27 UIU/ML (ref 0.27–4.2)

## 2021-12-06 PROCEDURE — 99238 HOSP IP/OBS DSCHRG MGMT 30/<: CPT | Performed by: NURSE PRACTITIONER

## 2021-12-06 PROCEDURE — 25010000002 INFLUENZA VAC SPLIT QUAD 0.5 ML SUSPENSION PREFILLED SYRINGE: Performed by: INTERNAL MEDICINE

## 2021-12-06 PROCEDURE — 93005 ELECTROCARDIOGRAM TRACING: CPT | Performed by: INTERNAL MEDICINE

## 2021-12-06 PROCEDURE — 83735 ASSAY OF MAGNESIUM: CPT | Performed by: HOSPITALIST

## 2021-12-06 PROCEDURE — 25010000002 ENOXAPARIN PER 10 MG: Performed by: INTERNAL MEDICINE

## 2021-12-06 PROCEDURE — 80048 BASIC METABOLIC PNL TOTAL CA: CPT | Performed by: INTERNAL MEDICINE

## 2021-12-06 PROCEDURE — G0008 ADMIN INFLUENZA VIRUS VAC: HCPCS | Performed by: INTERNAL MEDICINE

## 2021-12-06 PROCEDURE — 84443 ASSAY THYROID STIM HORMONE: CPT | Performed by: NURSE PRACTITIONER

## 2021-12-06 PROCEDURE — 90686 IIV4 VACC NO PRSV 0.5 ML IM: CPT | Performed by: INTERNAL MEDICINE

## 2021-12-06 RX ORDER — AMIODARONE HYDROCHLORIDE 200 MG/1
200 TABLET ORAL 2 TIMES DAILY WITH MEALS
Status: DISCONTINUED | OUTPATIENT
Start: 2021-12-06 | End: 2021-12-06 | Stop reason: HOSPADM

## 2021-12-06 RX ORDER — AMIODARONE HYDROCHLORIDE 200 MG/1
200 TABLET ORAL 2 TIMES DAILY WITH MEALS
Qty: 60 TABLET | Refills: 5 | Status: SHIPPED | OUTPATIENT
Start: 2021-12-06 | End: 2022-01-17

## 2021-12-06 RX ADMIN — AMIODARONE HYDROCHLORIDE 400 MG: 200 TABLET ORAL at 11:42

## 2021-12-06 RX ADMIN — SPIRONOLACTONE 25 MG: 25 TABLET, FILM COATED ORAL at 09:59

## 2021-12-06 RX ADMIN — SODIUM CHLORIDE, PRESERVATIVE FREE 10 ML: 5 INJECTION INTRAVENOUS at 09:59

## 2021-12-06 RX ADMIN — LISINOPRIL 20 MG: 20 TABLET ORAL at 09:59

## 2021-12-06 RX ADMIN — AMIODARONE HYDROCHLORIDE 400 MG: 200 TABLET ORAL at 04:48

## 2021-12-06 RX ADMIN — CARVEDILOL 12.5 MG: 12.5 TABLET, FILM COATED ORAL at 07:46

## 2021-12-06 RX ADMIN — INFLUENZA VIRUS VACCINE 0.5 ML: 15; 15; 15; 15 SUSPENSION INTRAMUSCULAR at 15:06

## 2021-12-06 RX ADMIN — ENOXAPARIN SODIUM 40 MG: 40 INJECTION SUBCUTANEOUS at 09:58

## 2021-12-06 NOTE — TELEPHONE ENCOUNTER
Pt with VVI CRT-D and latitude consult transmission showing VT events where pt received therapy. 1 VT episode 12/3/21, EGM shows VT in 200s, first ATP attempt pt received 5 bursts ATP which was unsuccessful, second ATP attempt pt received 8 bursts ATP which was unsuccessful as well. Device then began charging during which pt converted spontaneously and charging was stopped. This lasted 3 beats before pt went back into VT and device began charging again, this time delivering a 41j shock which was successful converting to sinus but lasted only 5 beats before pt began having VT again. Device redetected and charged for a second time, delivering a second 41j shock which was successful, pt converted to a sinus tach. Event total time was about 3m 40s. Pt was seen in ER and is currently admitted to [facility] following shock.   Just wanted to make you aware of recent events!

## 2021-12-06 NOTE — DISCHARGE SUMMARY
DISCHARGE NOTE    Patient Name: Tiffani Fiore  Age/Sex: 51 y.o. female  : 1970  MRN: [id number]    Date of Discharge:  2021   Date of Admit: 12/3/2021  Encounter Provider: WILD Lyle  Place of Service: [facility] CARDIOLOGY  Patient Care Team:  Provider, No Known as PCP - General  Provider, No Known as PCP - Family Medicine    Subjective:     Discharge Diagnosis:    ICD (implantable cardioverter-defibrillator) discharge    Cardiomyopathy, nonischemic (HCC)    S/P ICD (internal cardiac defibrillator) procedure    Ventricular tachycardia (HCC)    Hypokalemia    Hypomagnesemia    Hypertension    AICD discharge      Hospital Course:     51 yr old patient followed by Dr. Yancey for NICM, s/p ICD, PAT and VT---VT ablation by Dr. Yancey in 2017, recurrence, ablation by  Dr. Levy --.     Recurrent VT 2020 w/ICD shock---monitored, has had some non-sustained runs of checks but no therapies until 12/3.     Was at work, felt HR go up, the next thing she knew she was looking up at the ceiling---coworkers did CPR on her briefly because they said she turned blue. She was unaware of ICD shocking her but in ED was interrogated and confirmed that she had VT appropriately treated by ICD.     Her K+ and Mag were low on admission but after evaluation---probably inaccurate, electrolytes have been stable.     She was started on amiodarone and loaded orally (she has been on it in the past---short term)---she has had no recurrence since admission and is stable for dc home.     Echo this admission unchanged.     Will plan on amiodarone 200 mg BID until seen in office and we will also send out some gene testing on her.     She may need a repeat ablation.     Follow up in the office in 3-4 weeks.   Vital Signs  Temp:  [98.3 °F (36.8 °C)-98.9 °F (37.2 °C)] 98.3 °F (36.8  °C)  Heart Rate:  [70-80] 80  Resp:  [16-18] 16  BP: ()/(48-62) 93/53    Intake/Output Summary (Last 24 hours) at 12/6/2021 1602  Last data filed at 12/6/2021 1136  Gross per 24 hour   Intake 720 ml   Output --   Net 720 ml       Physical Exam:    General Appearance: No acute distress, well developed and well nourished.   Eyes: Conjunctiva and lids: No erythema, swelling, or discharge. Sclera non-icteric.   HENT: Atraumatic, normocephalic. External eyes, ears, and nose normal.   Respiratory: No signs of respiratory distress. Respiration rhythm and depth normal.   Clear to auscultation. No rales, crackles, rhonchi, or wheezing auscultated.   Cardiovascular:  Heart Rate and Rhythm: Normal, Heart Sounds: Normal S1 and S2. No S3 or S4 noted.  Murmurs: No murmurs noted. No rubs, thrills, or gallops.   Arterial Pulses: Posterior tibialis and dorsalis pedis pulses normal.   Lower Extremities: No edema noted.  Gastrointestinal:  Abdomen soft, non-distended, non-tender.  Musculoskeletal: Normal movement of extremities  Skin: Warm and dry.   Psychiatric: Patient alert and oriented to person, place, and time. Speech and behavior appropriate. Normal mood and affect.    Labs:   Results from last 7 days   Lab Units 12/06/21  1153 12/06/21  0510 12/05/21  2306 12/05/21  1400 12/05/21  0725 12/05/21  0447 12/04/21  1815 12/04/21  1101 12/04/21  0501 12/03/21  1804 12/03/21  1804   SODIUM mmol/L 136 136 138 135* 133* 138 138   < > 137   < > 145   POTASSIUM mmol/L 4.1 4.1 4.0 5.1 4.4 4.2 4.5   < > 5.0   < > 2.6*   CHLORIDE mmol/L 100 102 102 102 101 104 104   < > 102   < > 117*   CO2 mmol/L 24.0 23.1 23.1 24.2 25.3 23.0 24.2   < > 23.7   < > 19.8*   BUN mg/dL 15 13 12 14 12 12 12   < > 10   < > 10   CREATININE mg/dL 0.85 0.75 0.73 0.88 0.87 0.82 0.92   < > 0.59   < > 0.44*   GLUCOSE mg/dL 139* 96 92 110* 127* 99 118*   < > 97   < > 67   CALCIUM mg/dL 9.1 8.6 8.5* 8.8 9.1 8.9 8.9   < > 8.6   < > 5.9*   AST (SGOT) U/L  --   --    --   --   --   --   --   --  20  --  19   ALT (SGPT) U/L  --   --   --   --   --   --   --   --  21  --  16    < > = values in this interval not displayed.     Results from last 7 days   Lab Units 12/04/21  1101 12/04/21  0501 12/03/21  1804   TROPONIN T ng/mL 0.086* 0.118* 0.013     Results from last 7 days   Lab Units 12/04/21  0501 12/03/21  1804   WBC 10*3/mm3 10.53 11.52*   HEMOGLOBIN g/dL 12.4 12.5   HEMATOCRIT % 36.2 35.9   PLATELETS 10*3/mm3 202 197         Results from last 7 days   Lab Units 12/06/21  0510 12/04/21  0501 12/03/21  1804   MAGNESIUM mg/dL 2.0 2.4 1.5*             Results from last 7 days   Lab Units 12/06/21  1153   TSH uIU/mL 1.270       Discharge Diet:    Dietary Orders (From admission, onward)     Start     Ordered    12/04/21 1048  Diet Regular  Diet Effective Now        Question:  Diet Texture / Consistency  Answer:  Regular    12/04/21 1048                  Activity at Discharge:   Activity Instructions    Activity as tolerated          We did instruct her not to drive until we see her back in the office in 3-4 weeks.     Discharge Medications     Discharge Medications      New Medications      Instructions Start Date   amiodarone 200 MG tablet  Commonly known as: PACERONE   200 mg, Oral, 2 Times Daily With Meals, Until seen in the office in 3-4 weeks         Continue These Medications      Instructions Start Date   carvedilol 6.25 MG tablet  Commonly known as: COREG   TAKE TWO TABLETS BY MOUTH EVERY MORNING      lisinopril 20 MG tablet  Commonly known as: PRINIVIL,ZESTRIL   TAKE ONE TABLET BY MOUTH DAILY      spironolactone 25 MG tablet  Commonly known as: ALDACTONE   25 mg, Oral, Daily             Discharge disposition: home    Follow-up Appointments   Follow-up Information     Maci Menchaca APRN Follow up in 3 week(s).    Specialty: Cardiology  Why: 3-4 weeks   Contact information:  [address] [address] [phone]             Provider, No Known .     Contact information:  [facility] [address]                       Future Appointments   Date Time Provider Department Center   5/9/2022  8:30 AM WILDER IN OFFICE, [facility]   5/9/2022  9:20 AM Grayson Yancey MD [facility]         Maci Menchaca, WILD  12/06/21  16:02 EST

## 2021-12-06 NOTE — PROGRESS NOTES
Electrophysiology Follow-Up Note      Patient Name: Tiffani Fiore  Age/Sex: 51 y.o. female  : 1970  MRN: [id number]      Day of Service: 21       Chief Complaint/Follow-up: VT, ICD shocks    Interval History: No acute events overnight    S: Doing well, no recurrent VT, tolerating amiodarone      Temp:  [98 °F (36.7 °C)-98.9 °F (37.2 °C)] 98.3 °F (36.8 °C)  Heart Rate:  [70-80] 80  Resp:  [16-18] 16  BP: ()/(48-65) 93/53     PHYSICAL EXAM:    General Appearance: No acute distress, well developed and well nourished.   Eyes: Conjunctiva and lids: No erythema, swelling, or discharge. Sclera non-icteric.   HENT: Atraumatic, normocephalic. External eyes, ears, and nose normal.   Respiratory: No signs of respiratory distress. Respiration rhythm and depth normal.   Clear to auscultation. No rales, crackles, rhonchi, or wheezing auscultated.   Cardiovascular:  Heart Rate and Rhythm: Normal, Heart Sounds: Normal S1 and S2. No S3 or S4 noted.  Murmurs: No murmurs noted. No rubs, thrills, or gallops.   Arterial Pulses: Posterior tibialis and dorsalis pedis pulses normal.   Lower Extremities: No edema noted.  Gastrointestinal:  Abdomen soft, non-distended, non-tender.  Musculoskeletal: Normal movement of extremities  Skin: Warm and dry.   Psychiatric: Patient alert and oriented to person, place, and time. Speech and behavior appropriate. Normal mood and affect.       ECG/TELE: SR      Results from last 7 days   Lab Units 21  0510 21  2306 21  2306 21  1400 21  1400   SODIUM mmol/L 136  --  138  --  135*   POTASSIUM mmol/L 4.1  --  4.0  --  5.1   CHLORIDE mmol/L 102  --  102  --  102   CO2 mmol/L 23.1  --  23.1  --  24.2   BUN mg/dL 13  --  12  --  14   CREATININE mg/dL 0.75  --  0.73  --  0.88   GLUCOSE mg/dL 96   < > 92   < > 110*   CALCIUM mg/dL 8.6  --  8.5*  --  8.8    < > = values in this interval not displayed.     Results from last 7 days   Lab Units  12/04/21  0501 12/03/21  1804   WBC 10*3/mm3 10.53 11.52*   HEMOGLOBIN g/dL 12.4 12.5   HEMATOCRIT % 36.2 35.9   PLATELETS 10*3/mm3 202 197         Results from last 7 days   Lab Units 12/04/21  1101 12/04/21  0501 12/03/21  1804   TROPONIN T ng/mL 0.086* 0.118* 0.013               Current Medications:   Scheduled Meds:amiodarone, 400 mg, Oral, Q8H  carvedilol, 12.5 mg, Oral, QAM  enoxaparin, 40 mg, Subcutaneous, Q24H  lisinopril, 20 mg, Oral, Daily  sodium chloride, 10 mL, Intravenous, Q12H  spironolactone, 25 mg, Oral, Daily            ICD (implantable cardioverter-defibrillator) discharge    Cardiomyopathy, nonischemic (HCC)    S/P ICD (internal cardiac defibrillator) procedure    Ventricular tachycardia (HCC)    Hypokalemia    Hypomagnesemia    Hypertension    AICD discharge       Plan:     --VT, ICD shocks, had VT ablation in 2018 (Dr. Levy--)---oral amio load, tolerating well.     --NICM, on GDMT--echo basically unchanged.      Ok to dc home today---will stay on current meds and addition of amiodarone 200 mg BID until we see her back in the office in 3-4 weeks. Will plan on sending off gene testing at that time as well.     Maci Menchaca, WILD  12/06/21  12:13 EST

## 2021-12-06 NOTE — PROGRESS NOTES
Name: Tiffani Fiore ADMIT: 12/3/2021   : 1970  PCP: Provider, No Known    MRN: [id number] LOS: 3 days   AGE/SEX: 51 y.o. female  ROOM: [facility]/     Subjective   Subjective    patient is lying in bed in no major distress.  No new events overnight.  Denies nausea, vomiting abdominal pain, chest pain.    Review of Systems   Constitutional: Negative for fever.   Respiratory: Negative for cough and shortness of breath.    Cardiovascular: Negative for leg swelling.   Gastrointestinal: Negative for abdominal pain.      Objective   Objective   Vital Signs  Temp:  [98.3 °F (36.8 °C)-98.9 °F (37.2 °C)] 98.3 °F (36.8 °C)  Heart Rate:  [70-80] 80  Resp:  [16-18] 16  BP: ()/(48-62) 93/53  SpO2:  [94 %-96 %] 95 %  on   ;   Device (Oxygen Therapy): room air  Body mass index is 33.06 kg/m².    Physical Exam  Constitutional:       General: She is not in acute distress.     Appearance: Normal appearance. She is not toxic-appearing.   HENT:      Head: Normocephalic and atraumatic.   Cardiovascular:      Rate and Rhythm: Normal rate and regular rhythm.   Pulmonary:      Effort: Pulmonary effort is normal. No respiratory distress.      Breath sounds: Normal breath sounds. No wheezing or rhonchi.   Abdominal:      General: Bowel sounds are normal. There is no distension.      Palpations: Abdomen is soft.      Tenderness: There is no abdominal tenderness. There is no guarding or rebound.   Musculoskeletal:         General: No swelling.      Right lower leg: No edema.      Left lower leg: No edema.   Skin:     General: Skin is warm and dry.   Neurological:      General: No focal deficit present.      Mental Status: She is alert and oriented to person, place, and time.   Psychiatric:         Mood and Affect: Mood normal.         Behavior: Behavior normal.     Results Review  I reviewed the patient's new clinical results.  Results from last 7 days   Lab Units 21  0501 21  1804   WBC 10*3/mm3 10.53 11.52*    HEMOGLOBIN g/dL 12.4 12.5   PLATELETS 10*3/mm3 202 197     Results from last 7 days   Lab Units 12/06/21  1153 12/06/21  0510 12/05/21  2306 12/05/21  1400   SODIUM mmol/L 136 136 138 135*   POTASSIUM mmol/L 4.1 4.1 4.0 5.1   CHLORIDE mmol/L 100 102 102 102   CO2 mmol/L 24.0 23.1 23.1 24.2   BUN mg/dL 15 13 12 14   CREATININE mg/dL 0.85 0.75 0.73 0.88   GLUCOSE mg/dL 139* 96 92 110*     Lab Results   Component Value Date    ANIONGAP 12.0 12/06/2021     Estimated Creatinine Clearance: 86.7 mL/min (by C-G formula based on SCr of 0.85 mg/dL).    Results from last 7 days   Lab Units 12/04/21  0501 12/03/21  1804   ALBUMIN g/dL 4.30 3.00*   BILIRUBIN mg/dL 0.3 0.3   ALK PHOS U/L 99 68   AST (SGOT) U/L 20 19   ALT (SGPT) U/L 21 16     Results from last 7 days   Lab Units 12/06/21  1153 12/06/21  0510 12/05/21  2306 12/05/21  1400 12/04/21  1101 12/04/21  0501 12/03/21  1804 12/03/21  1804   CALCIUM mg/dL 9.1 8.6 8.5* 8.8   < > 8.6   < > 5.9*   ALBUMIN g/dL  --   --   --   --   --  4.30  --  3.00*   MAGNESIUM mg/dL  --  2.0  --   --   --  2.4  --  1.5*    < > = values in this interval not displayed.       Glucose   Date/Time Value Ref Range Status   12/04/2021 0643 110 70 - 130 mg/dL Final     Comment:     Meter: [id number] : [id number] Zain Pandya AARON   12/03/2021 2342 115 70 - 130 mg/dL Final     Comment:     Meter: [id number] : [id number] Efrain Contreras RN       Scheduled Meds  amiodarone, 200 mg, Oral, BID With Meals  carvedilol, 12.5 mg, Oral, QAM  enoxaparin, 40 mg, Subcutaneous, Q24H  lisinopril, 20 mg, Oral, Daily  sodium chloride, 10 mL, Intravenous, Q12H  spironolactone, 25 mg, Oral, Daily    Continuous Infusions   PRN Meds  •  acetaminophen  •  magnesium sulfate **OR** magnesium sulfate **OR** magnesium sulfate  •  nitroglycerin  •  ondansetron  •  potassium chloride **OR** potassium chloride **OR** potassium chloride  •  potassium chloride  •  [COMPLETED] Insert peripheral IV **AND** sodium  chloride  •  sodium chloride     Diet  Diet Regular       Lab Results   Lab Value Date/Time    TROPONINT 0.086 (C) 12/04/2021 1101    TROPONINT 0.118 (C) 12/04/2021 0501    TROPONINT 0.013 12/03/2021 1804        Assessment/Plan     Active Hospital Problems    Diagnosis  POA   • **ICD (implantable cardioverter-defibrillator) discharge [Z45.02]  Not Applicable   • Hypokalemia [E87.6]  Unknown   • Hypomagnesemia [E83.42]  Unknown   • AICD discharge [Z45.02]  Not Applicable   • Hypertension [I10]  Unknown   • Ventricular tachycardia (HCC) [I47.2]  Yes   • S/P ICD (internal cardiac defibrillator) procedure [Z95.810]  Yes   • Cardiomyopathy, nonischemic (HCC) [I42.8]  Yes      Resolved Hospital Problems   No resolved problems to display.     51 y.o. female admitted with ICD (implantable cardioverter-defibrillator) discharge. History of nonischemic cardiomyopathy      The patient has been initiated on amiodarone per Cardiology and she is on Coreg as well.  Currently a compensated from heart failure standpoint.  On Coreg as well as Aldactone which will be continued and patient is on lisinopril as well.  Electrolytes are normal as well.  On Lovenox for DVT prophylaxis.  Stable to be discharged from cardiology standpoint.      Prince Palencia MD  [facility]  12/06/21  16:03 EST    I wore protective equipment throughout this patient encounter including a face mask, gloves, and protective eyewear.  Hand hygiene was performed before donning protective equipment and after removal when leaving the room.

## 2021-12-06 NOTE — CASE MANAGEMENT/SOCIAL WORK
Discharge Planning Assessment  [facility]     Patient Name: Tiffani Fiore  MRN: [id number]  Today's Date: 12/6/2021    Admit Date: 12/3/2021     Discharge Needs Assessment     Row Name 12/06/21 1501       Living Environment    Lives With significant other    Name(s) of Who Lives With Patient significant other Chidi Morrell [phone]    Current Living Arrangements home/apartment/condo    Primary Care Provided by self; spouse/significant other    Provides Primary Care For no one, unable/limited ability to care for self    Family Caregiver if Needed significant other    Family Caregiver Names significant other Chidi Morrell [phone]    Quality of Family Relationships helpful; involved; supportive    Able to Return to Prior Arrangements yes       Resource/Environmental Concerns    Resource/Environmental Concerns none       Transition Planning    Patient/Family Anticipates Transition to home with family    Patient/Family Anticipated Services at Transition none    Transportation Anticipated family or friend will provide       Discharge Needs Assessment    Equipment Currently Used at Home none    Concerns to be Addressed discharge planning    Anticipated Changes Related to Illness none    Equipment Needed After Discharge none    Current Discharge Risk physical impairment               Discharge Plan     Row Name 12/06/21 8189       Plan    Plan Plans home; denies needs.    Provided Post Acute Provider List? N/A    N/A Provider List Comment Denies any HH/SNF needs.    Provided Post Acute Provider Quality & Resource List? N/A    N/A Quality & Resource List Comment Denies any HH/SNF needs.    Patient/Family in Agreement with Plan yes    Plan Comments Spoke to the patient; explained role of CCP, verified facesheet and discussed discharge planning needs.  The patient plans to return home upon d/c with assistance from her significant other Chidi Morrell [phone].  The patient denies any DME, has no  trouble ambulating or navigating steps, has no PCP and was provided with the phone number to call for the Provider Directory Assistance in order to obtain a PCP.  The patient’s pharmacy is [address] and the patient denies wanting to use [facility].  The patient denies any HH/SNF history, has her significant other listed as her Healthcare Surrogate on her living will in [facility], drives herself to appointments and states that her significant other will transport her home upon d/c. CCP will follow to see if the patient will need Lovenox upon d/c.  MANN Barragan              Continued Care and Services - Admitted Since 12/3/2021    Coordination has not been started for this encounter.       Expected Discharge Date and Time     Expected Discharge Date Expected Discharge Time    Dec 6, 2021          Demographic Summary     Row Name 12/06/21 1450       General Information    Admission Type inpatient    Arrived From home    Referral Source admission list    Reason for Consult discharge planning    Preferred Language English     Used During This Interaction no               Functional Status     Row Name 12/06/21 1450       Functional Status    Usual Activity Tolerance good    Current Activity Tolerance moderate       Functional Status, IADL    Medications independent    Meal Preparation independent    Housekeeping independent    Laundry independent    Shopping independent       Mental Status Summary    Recent Changes in Mental Status/Cognitive Functioning no changes               Psychosocial    No documentation.                Abuse/Neglect    No documentation.                Legal    No documentation.                Substance Abuse    No documentation.                Patient Forms    No documentation.                   MANN Dobbs

## 2021-12-06 NOTE — PLAN OF CARE
Goal Outcome Evaluation:  Plan of Care Reviewed With: patient        Progress: improving  Outcome Summary: discharging home

## 2021-12-06 NOTE — PAYOR COMM NOTE
"Tiffani Lopez (51 y.o. Female)                      ATTENTION;    INITIAL CLINICALS FOR REVIEW, AUTH PENDING [id number]                     REPLY TO UR DEPT MINI DAVILA LPN  [phone] OR CALL                  Date of Birth Social Security Number Address Home Phone MRN    1970  [id number] [facility]  [place] [facility] [address] [phone] [id number]    Tenriism Marital Status             None Single       Admission Date Admission Type Admitting Provider Attending Provider Department, Room/Bed    12/3/21 Emergency Ray Oneill MD Reddy, Rahul Kandada, MD [address] [facility],     Discharge Date Discharge Disposition Discharge Destination                         Attending Provider: Prince Palencia MD    Allergies: No Known Allergies    Isolation: None   Infection: None   Code Status: CPR   Advance Care Planning Activity    Ht: 165 cm (64.96\")   Wt: 90 kg (198 lb 6.6 oz)    Admission Cmt: None   Principal Problem: ICD (implantable cardioverter-defibrillator) discharge [Z45.02]                 Active Insurance as of 12/3/2021     Primary Coverage     Payor Plan Insurance Group Employer/Plan Group    ANTHEM BLUE CROSS ANTHEM BLUE CROSS BLUE SHIELD PPO [id number]     Payor Plan Address Payor Plan Phone Number Payor Plan Fax Number Effective Dates    [address] [phone]  2021 - None Entered    [place] [address]       Subscriber Name Subscriber Birth Date Member ID       TIFFANI LOPEZ 1970 [id number]                 Emergency Contacts      (Rel.) Home Phone Work Phone Mobile Phone    Chidi Morrell (Significant Other) [phone] -- [phone]               History & Physical      Ray Oneill MD at 21 2020          Internal medicine history and physical  INTERNAL MEDICINE   [facility]       Patient Identification:  Name: Tiffani Lopez  Age: 51 y.o.  Sex: female  :  1970  MRN: " [id number]                   Primary Care Physician: Provider, No Known                               Date of admission:12/3/2021    Chief Complaint: Passed out at work with a prodrome of everything that came down in her stomach as if she is coming off normal question and subsequent discomfort around her chest as if he was shocked by his AICD device.  This occurred earlier today.    History of Present Illness:   Patient is a 51-year-old female with past medical history remarkable for nonischemic cardiomyopathy, history of paroxysmal atrial tachycardia, history of paroxysmal ventricular tachycardia for which she has AICD device in place, prior history of PE migraine headaches hypertension and dyslipidemia was in her usual state of health when she went to work.  She had her knee without any problem and subsequently was having conversation with her coworker and suddenly she felt feeling as if she is coming down the roller coaster with everything resting down and then subsequently she passed out and felt as if she was shocked by her defibrillator.  Patient describes the pain she is regaining her consciousness she saw lots of people including strangers around her and she was asking the stranger to move away from her because she knew that she is about to be shocked.  It is reported that bystanders were around her when this event occurred that she was cyanotic and had turned blue.  Patient did have multiple episodes of nausea and vomiting prior to coming to the hospital and admits to have urinated on herself.  Patient denies any recent changes in her diet habits, or drinking alcohol, or taking any new medications.  Work-up in the emergency room revealed hypokalemia serum magnesium of 1.5 and normal troponin.  Because of her electrolyte imbalance internal medicine service is asked to admit the patient while cardiology service providing consulting health for possible recurrence of V. tach and subsequent AICD discharge.   Patient is currently feeling tired.      Past Medical History:  Past Medical History:   Diagnosis Date   • Cardiomyopathy (CMS/[facility]) 2012   • Chest pain    • CHF (congestive heart failure) (CMS/[facility])    • Clostridium difficile colitis 2013   • Depression    • DVT (deep venous thrombosis) (CMS/[facility]) 2012   • Dyspnea    • Fatigue    • History of paroxysmal atrial tachycardia    • History of transfusion    • Hyperlipidemia    • Hypertension    • ICD (implantable cardioverter-defibrillator) in place     LEFT SUBCLAVIAN AREA   • Ischemic cardiomyopathy    • Migraine    • Myocardial infarction (CMS/[facility]) 2012   • Obesity due to excess calories    • Overweight (BMI 25.0-29.9)    • Palpitations     ICD   • PONV (postoperative nausea and vomiting)    • Pulmonary embolism (CMS/[facility])    • S/P ICD (internal cardiac defibrillator) procedure    • SVT (supraventricular tachycardia) (CMS/[facility])    • Tachycardia    • VT (ventricular tachycardia) (CMS/[facility])     recurrent     Past Surgical History:  Past Surgical History:   Procedure Laterality Date   • ABLATION OF DYSRHYTHMIC FOCUS  02/2018    Multiple VT ablations, last on at -Dr Levy, epicardial VT 2/2018   • BRONCHOSCOPY N/A 3/8/2018    Procedure: BRONCHOSCOPY WITH BAL;  Surgeon: Berto Nicholson MD;  Location: [facility] ENDOSCOPY;  Service:    • CARDIAC CATHETERIZATION N/A 8/30/2017    Procedure: Left Heart Cath;  Surgeon: Saúl Ann MD;  Location: [facility] INVASIVE LOCATION;  Service:    • CARDIAC CATHETERIZATION N/A 8/30/2017    Procedure: Left ventriculography;  Surgeon: Saúl Ann MD;  Location: [facility] INVASIVE LOCATION;  Service:    • CARDIAC CATHETERIZATION N/A 8/30/2017    Procedure: Coronary angiography;  Surgeon: Saúl Ann MD;  Location: [facility] CATH INVASIVE LOCATION;  Service:    • CARDIAC DEFIBRILLATOR PLACEMENT  2012   • CARDIAC DEFIBRILLATOR PLACEMENT  2015    CONNER DID LEAD REVISION WITH REPLACEMENT   • CARDIAC DEFIBRILLATOR PLACEMENT     •  CARDIAC DEFIBRILLATOR PLACEMENT     • CARDIAC ELECTROPHYSIOLOGY PROCEDURE N/A 2017    Procedure: Ablation VT  PATIENT HAS BOSTON ICD;  Surgeon: Grayson Yancey MD;  Location: [facility] INVASIVE LOCATION;  Service:    •  SECTION     • HYSTERECTOMY     • IMPLANTABLE CARDIOVERTER DEFIBRILLATOR LEAD REPLACEMENT/POCKET REVISION N/A 10/20/2017    Procedure: ICD LEAD EXTRACTION TRANSVENOUS AND PLACEMENT OF RV AND LV LEAD;  Surgeon: Amrit Salgado MD;  Location: [facility] OR ;  Service:    • TUBAL ABDOMINAL LIGATION        Home Meds:  (Not in a hospital admission)    Current Meds:     Current Facility-Administered Medications:   •  [COMPLETED] amiodarone in dextrose 5% (NEXTERONE) loading dose 150mg/100mL, 150 mg, Intravenous, Once, Stopped at 21 **FOLLOWED BY** amiodarone 360 mg in 200 mL D5W infusion, 1 mg/min, Intravenous, Continuous, Last Rate: 33.3 mL/hr at 21, 1 mg/min at 21 **FOLLOWED BY** [START ON 2021] amiodarone 360 mg in 200 mL D5W infusion, 0.5 mg/min, Intravenous, Continuous, Jairo Marshall MD  •  calcium gluconate 1g/50ml 0.675% NaCl IV SOLN, 1 g, Intravenous, Once, Angelica Poole APRN  •  lidocaine (XYLOCAINE) 1 % injection 10 mL, 10 mL, Injection, Once, Angelica Poole APRN  •  potassium chloride 10 mEq in 100 mL IVPB, 10 mEq, Intravenous, Q1H PRN, Angelica Poole APRN, Last Rate: 100 mL/hr at 21, 10 mEq at 21  •  [COMPLETED] Insert peripheral IV, , , Once **AND** sodium chloride 0.9 % flush 10 mL, 10 mL, Intravenous, PRN, William Carreno MD    Current Outpatient Medications:   •  carvedilol (COREG) 6.25 MG tablet, TAKE TWO TABLETS BY MOUTH EVERY MORNING, Disp: 180 tablet, Rfl: 1  •  lisinopril (PRINIVIL,ZESTRIL) 20 MG tablet, TAKE ONE TABLET BY MOUTH DAILY, Disp: 90 tablet, Rfl: 1  •  spironolactone (ALDACTONE) 25 MG tablet, Take 1 tablet by mouth Daily., Disp: 30 tablet, Rfl: 11  Allergies:  No Known  Allergies  Social History:   Social History     Tobacco Use   • Smoking status: Former Smoker     Packs/day: 0.50     Years: 15.00     Pack years: 7.50     Types: Cigarettes     Quit date:      Years since quittin.9   • Smokeless tobacco: Former User   • Tobacco comment: quit age 42   Substance Use Topics   • Alcohol use: No      Family History:  Family History   Problem Relation Age of Onset   • Diabetes Father    • Cancer Father         lung   • COPD Mother    • Malig Hyperthermia Neg Hx           Review of Systems  See history of present illness and past medical history.    Constitutional: Remarkable for no fever or chills but does have residual generalized weakness.  Cardiovascular: Remarkable for pressure-like discomfort around the chest and did have palpitations and symptoms as described above  Respiratory: Remarkable for no cough congestion or pleuritic chest pain  GI: Remarkable for nausea vomiting since the event but now it is improved and she had some residual nausea.  Denies any abdominal pain.  : Remarkable for no burning in urination frequency urgency but did have incontinence upon herself as per history.  Musculoskeletal: Remarkable for no new joint aches and pain  Neurological: Remarkable for episode of syncope as described but no focal residual weakness of arm or legs and confusion or disorientation.    Remainder of ROS is negative.      Vitals:   /67   Pulse 68   Temp 98 °F (36.7 °C)   Resp 18   LMP 1997   SpO2 99%   Breastfeeding No   I/O:     Intake/Output Summary (Last 24 hours) at 12/3/2021 2020  Last data filed at 12/3/2021 1915  Gross per 24 hour   Intake 100 ml   Output --   Net 100 ml     Exam:  Patient is examined using the personal protective equipment as per guidelines from infection control for this particular patient as enacted.  Hand washing was performed before and after patient interaction.  General Appearance:   Alert cooperative nontoxic comfortable  appearing female who does not appear to be in any acute distress.   Head:    Normocephalic, without obvious abnormality, atraumatic   Eyes:    PERRL, conjunctiva/corneas clear, EOM's intact, both eyes   Ears:    Normal external ear canals, both ears   Nose:   Nares normal, septum midline, mucosa normal, no drainage    or sinus tenderness   Throat:   Lips, tongue, gums normal; oral mucosa pink and moist   Neck:   Supple, symmetrical, trachea midline, no adenopathy;     thyroid:  no enlargement/tenderness/nodules; no carotid    bruit or JVD   Back:     Symmetric, no curvature, ROM normal, no CVA tenderness   Lungs:     Clear to auscultation bilaterally, respirations unlabored   Chest Wall:    No tenderness or deformity    Heart:   S1-S2 regular   Abdomen:    Soft nontender   Extremities:   Extremities normal, atraumatic, no cyanosis or edema   Pulses:   Pulses palpable in all extremities; symmetric all extremities   Skin:   Skin color normal, Skin is warm and dry,  no rashes or palpable lesions   Neurologic:  Alert and oriented x3 nonfocal neurological examination       Data Review:      I reviewed the patient's new clinical results.  Results from last 7 days   Lab Units 12/03/21  1804   WBC 10*3/mm3 11.52*   HEMOGLOBIN g/dL 12.5   PLATELETS 10*3/mm3 197     Results from last 7 days   Lab Units 12/03/21  1804   SODIUM mmol/L 145   POTASSIUM mmol/L 2.6*   CHLORIDE mmol/L 117*   CO2 mmol/L 19.8*   BUN mg/dL 10   CREATININE mg/dL 0.44*   CALCIUM mg/dL 5.9*   GLUCOSE mg/dL 67     Microbiology Results (last 10 days)     Procedure Component Value - Date/Time    COVID PRE-OP / PRE-PROCEDURE SCREENING ORDER (NO ISOLATION) - Swab, Nasopharynx [[id number]]  (Normal) Collected: 12/03/21 1914    Lab Status: Final result Specimen: Swab from Nasopharynx Updated: 12/03/21 2131    Narrative:      The following orders were created for panel order COVID PRE-OP / PRE-PROCEDURE SCREENING ORDER (NO ISOLATION) - Swab,  Nasopharynx.  Procedure                               Abnormality         Status                     ---------                               -----------         ------                     COVID-19,[facility]HOUSE...[[id number]]  Normal              Final result                 Please view results for these tests on the individual orders.    COVID-19,[facility]HOU[facility] CEPHEID/SARBJIT NP SWAB IN TRANSPORT MEDIA 8-12 HR TAT - Swab, Nasopharynx [[id number]]  (Normal) Collected: 12/03/21 1914    Lab Status: Final result Specimen: Swab from Nasopharynx Updated: 12/03/21 2131     COVID19 Not Detected    Narrative:      Fact sheet for providers: https://www.fda.gov/media/682000/download    Fact sheet for patients: https://www.fda.gov/media/295106/download    Test performed by PCR.        XR Chest 1 View    Result Date: 12/3/2021   1. Stable findings when compared to the prior study dated 02/24/2020.  2. Enlarged cardiomediastinal silhouette.  3. Left subclavian approach pacer device.  4. Chronic elevation of the left hemidiaphragm.  This report was finalized on 12/3/2021 9:04 PM by Dr. Garry Oh M.D.      ECG 12 Lead   Preliminary Result   HEART RATE= 71  bpm   RR Interval= 844  ms   IA Interval= 168  ms   P Horizontal Axis= 10  deg   P Front Axis= 24  deg   QRSD Interval= 175  ms   QT Interval= 481  ms   QRS Axis= 33  deg   T Wave Axis= -10  deg   - ABNORMAL ECG -   Sinus rhythm   Left bundle branch block   Electronically Signed By:    Date and Time of Study: 2021-12-04 04:00:27      ECG 12 Lead   Final Result   HEART RATE= 66  bpm   RR Interval= 908  ms   IA Interval= 196  ms   P Horizontal Axis= 44  deg   P Front Axis= 46  deg   QRSD Interval= 178  ms   QT Interval= 469  ms   QRS Axis= 27  deg   T Wave Axis= 17  deg   - ABNORMAL ECG -   Sinus rhythm   Left bundle branch block   No change from prior tracing   Electronically Signed By: Saúl Ann) ([facility]) 03-Dec-2021 22:55:15   Date and Time of Study: 2021-12-03  22:43:46      ECG 12 Lead    (Results Pending)       Assessment:  Active Hospital Problems    Diagnosis  POA   • **ICD (implantable cardioverter-defibrillator) discharge [Z45.02]  Not Applicable   • Hypokalemia [E87.6]  Unknown   • Hypomagnesemia [E83.42]  Unknown   • Hypertension [I10]  Unknown   • Ventricular tachycardia (HCC) [I47.2]  Yes   • S/P ICD (internal cardiac defibrillator) procedure [Z95.810]  Yes     CRT-D     • Cardiomyopathy, nonischemic (HCC) [I42.8]  Yes       Medical decision making:  Syncopal episode with a prodrome followed by chest discomfort and reported AICD discharge with history of V. tach and cardiomyopathy-plan is to admit the patient correct her electrolyte abnormalities including magnesium and potassium allow her diet once cleared by cardiology service and follow-up her magnesium and potassium level with correction.  Cardiology service has been consulted.  Check serial troponin.  Device interrogation per cardiology service.  Hypokalemia and hypomagnesemia and hypocalcemia-etiology for this type of electrolyte abnormalities is unclear as patient denies any alcohol use, changes in her diet pattern or taking any new medications or supplements.  Plan is to put her on electrolyte replacement per protocol and check serial magnesium and BMP levels.  Liver disease due to evolving MACIAS as a result of fatty liver disease need to be considered as patient has hypoalbuminemia which is concerning since she claims to be very meticulous about her diet.  Hypertension-continue antihypertensive regimen with close monitoring of her blood pressure.  Nausea and urinary incontinence with vomiting after the syncopal episode-unclear if it can all been explained by syncopal episode or patient may have primary neurological event due to hypoxia.  Watch for any neurological changes and may benefit from neurology evaluation if there are any neurological changes or mental status changes.    Ray Oneill,  MD   12/3/2021  20:20 EST  Much of this encounter note is an electronic transcription/translation of spoken language to printed text. The electronic translation of spoken language may permit erroneous, or at times, nonsensical words or phrases to be inadvertently transcribed; Although I have reviewed the note for such errors, some may still exist      Electronically signed by Ray Oneill MD at 12/04/21 0559          Emergency Department Notes      Maria Fernanda Haq, RN at 12/03/21 1723        Patient was at work she had a syncopal episode while at work, her coworker caught her and she did not hit her head. Co-worker felt patient jerk and they think her Defibrillator went off. Co worker reports patient turned blue, EMS arrived and patient was awake and a&ox4. She was pale and clammy when they got there.  They gave patient 4 mg of zofran.    Patient has on mask nurse has on proper ppe.    Electronically signed by Maria Fernanda Haq RN at 12/03/21 1725     William Carreno MD at 12/03/21 1737        Documentation error, I did not actually see this patient but rather she was seen by Dr. Arshad.  Please see his note           William Carreno MD  12/04/21 1530      Electronically signed by William Carreno MD at 12/04/21 1530     Angelica Poole APRN at 12/03/21 1817      Procedure Orders    1. Critical Care [[id number]] ordered by Angelica Poole APRN          Attestation signed by Gorge Arshad MD at 12/04/21 0216          For this patient encounter, I reviewed the NP or PA documentation, treatment plan, and medical decision making. Gorge Arshad MD 12/4/2021 02:16 EST                  EMERGENCY DEPARTMENT ENCOUNTER    Room Number:  30/30  Date seen:  12/3/2021  Time seen: 18:17 EST  PCP: Provider, No Known  Historian: patient, EMS    HPI:  Chief complaint:syncope  A complete HPI/ROS/PMH/PSH/SH/FH are unobtainable due to: n/a  Context:Tiffani Fiore is a 51 y.o. female who presents to the ED with c/o  "being in midst of conversation with coworkers when she felt the abnormal sensation in her chest that she gets when her defibrillator is about to fire.  Next thing she knows she is on the floor with people around her.  According to coworkers she turned blue and \"jerked\" and they thought her defibrillator may have fired. She states she has not had this happen in 2 years.  She has h/o vtach followed by Dr. Yancey.  Currently, she is very nauseated and states that she urinated on herself during this event which has not happened in the past.  She has vomited several times prior to arrival.  She is complaining of moderate chest pain described as a band squeezing around her chest but thinks that this is soreness related to the defibrillator firing.  She denies any shortness of breath.    Patient was placed in face mask in first look. Patient was wearing facemask when I entered the room and throughout our encounter. I wore full protective equipment throughout this patient encounter including a N95 face mask, eye shield and gloves. Hand hygiene/washing of hands was performed before donning protective equipment and after removal when leaving the room.    MEDICAL RECORD REVIEW    ALLERGIES  Patient has no known allergies.    PAST MEDICAL HISTORY  Active Ambulatory Problems     Diagnosis Date Noted   • Cardiomyopathy, nonischemic ([facility]) 05/02/2016   • Atrial tachycardia, paroxysmal ([facility]) 05/02/2016   • S/P ICD (internal cardiac defibrillator) procedure 06/21/2017   • Obesity 06/21/2017   • Ventricular tachycardia ([facility]) 08/23/2017   • Arrhythmia 09/27/2017   • Cardiomyopathy ([facility]) 10/20/2017   • Overweight (BMI 25.0-29.9) 06/24/2019   • Recurrent ventricular tachycardia ([facility]) 02/24/2020     Resolved Ambulatory Problems     Diagnosis Date Noted   • No Resolved Ambulatory Problems     Past Medical History:   Diagnosis Date   • Chest pain    • CHF (congestive heart failure) (CMS/[facility])    • Clostridium difficile colitis 2013   • " Depression    • DVT (deep venous thrombosis) (CMS/[facility])    • Dyspnea    • Fatigue    • History of paroxysmal atrial tachycardia    • History of transfusion    • Hyperlipidemia    • Hypertension    • ICD (implantable cardioverter-defibrillator) in place    • Ischemic cardiomyopathy    • Migraine    • Myocardial infarction (CMS/[facility])    • Obesity due to excess calories    • Palpitations    • PONV (postoperative nausea and vomiting)    • Pulmonary embolism (CMS/[facility])    • SVT (supraventricular tachycardia) (CMS/[facility])    • Tachycardia    • VT (ventricular tachycardia) (CMS/[facility])        PAST SURGICAL HISTORY  Past Surgical History:   Procedure Laterality Date   • ABLATION OF DYSRHYTHMIC FOCUS  2018    [facility] VT 2018   • BRONCHOSCOPY N/A 3/8/2018    Procedure: BRONCHOSCOPY WITH BAL;  Surgeon: Berto Nicholson MD;  Location: [facility] ENDOSCOPY;  Service:    • CARDIAC CATHETERIZATION N/A 2017    Procedure: Left Heart Cath;  Surgeon: Saúl Ann MD;  Location: [facility] CATH INVASIVE LOCATION;  Service:    • CARDIAC CATHETERIZATION N/A 2017    Procedure: Left ventriculography;  Surgeon: Saúl Ann MD;  Location: [facility] CATH INVASIVE LOCATION;  Service:    • CARDIAC CATHETERIZATION N/A 2017    Procedure: Coronary angiography;  Surgeon: Saúl Ann MD;  Location: [facility] CATH INVASIVE LOCATION;  Service:    • CARDIAC DEFIBRILLATOR PLACEMENT     • CARDIAC DEFIBRILLATOR PLACEMENT      CONNER FREGOSO LEAD REVISION WITH REPLACEMENT   • CARDIAC DEFIBRILLATOR PLACEMENT     • CARDIAC DEFIBRILLATOR PLACEMENT     • CARDIAC ELECTROPHYSIOLOGY PROCEDURE N/A 2017    Procedure: Ablation VT  PATIENT HAS BOSTON ICD;  Surgeon: Grayson Yancey MD;  Location: [facility] CATH INVASIVE LOCATION;  Service:    •  SECTION     • HYSTERECTOMY     • IMPLANTABLE CARDIOVERTER DEFIBRILLATOR LEAD REPLACEMENT/POCKET REVISION N/A 10/20/2017    Procedure: ICD LEAD  EXTRACTION TRANSVENOUS AND PLACEMENT OF RV AND LV LEAD;  Surgeon: Amrit Salgado MD;  Location: [facility] OR ;  Service:    • TUBAL ABDOMINAL LIGATION         FAMILY HISTORY  Family History   Problem Relation Age of Onset   • Diabetes Father    • Cancer Father         lung   • COPD Mother    • Malig Hyperthermia Neg Hx        SOCIAL HISTORY  Social History     Socioeconomic History   • Marital status: Single   Tobacco Use   • Smoking status: Former Smoker     Packs/day: 0.50     Years: 15.00     Pack years: 7.50     Types: Cigarettes     Quit date:      Years since quittin.9   • Smokeless tobacco: Former User   • Tobacco comment: quit age 42   Vaping Use   • Vaping Use: Never used   Substance and Sexual Activity   • Alcohol use: No   • Drug use: No   • Sexual activity: Defer       REVIEW OF SYSTEMS  Review of Systems    All systems reviewed and negative except for those discussed in HPI.     PHYSICAL EXAM    ED Triage Vitals [21 1722]   Temp Heart Rate Resp BP SpO2   98 °F (36.7 °C) 84 18 121/76 97 %      Temp src Heart Rate Source Patient Position BP Location FiO2 (%)   -- -- -- -- --     Physical Exam    I have reviewed the triage vital signs and nursing notes.      GENERAL:  Distressed, appears uncomfortable  HENT: nares patent, mm moist  EYES: no scleral icterus  NECK: no ROM limitations  CV: regular rhythm, regular rate, no murmur, no rubs, no gallups  RESPIRATORY: normal effort, CTAB  ABDOMEN: soft  : deferred  MUSCULOSKELETAL: no deformity  NEURO: alert, moves all extremities, follows commands  SKIN: warm, dry    Critical Care  Performed by: Angelica Poole APRN  Authorized by: Gorge Arshad MD     Critical care provider statement:     Critical care time (minutes): 30-74 minutes.    Critical care was necessary to treat or prevent imminent or life-threatening deterioration of the following conditions:  Cardiac failure and circulatory failure    Critical care was time spent  personally by me on the following activities:  Blood draw for specimens, development of treatment plan with patient or surrogate, discussions with consultants, evaluation of patient's response to treatment, examination of patient, obtaining history from patient or surrogate, ordering and performing treatments and interventions, ordering and review of laboratory studies, ordering and review of radiographic studies, pulse oximetry, re-evaluation of patient's condition and review of old charts        LAB RESULTS  Recent Results (from the past 24 hour(s))   Comprehensive Metabolic Panel    Collection Time: 12/03/21  6:04 PM    Specimen: Blood   Result Value Ref Range    Glucose 67 65 - 99 mg/dL    BUN 10 6 - 20 mg/dL    Creatinine 0.44 (L) 0.57 - 1.00 mg/dL    Sodium 145 136 - 145 mmol/L    Potassium 2.6 (C) 3.5 - 5.2 mmol/L    Chloride 117 (H) 98 - 107 mmol/L    CO2 19.8 (L) 22.0 - 29.0 mmol/L    Calcium 5.9 (C) 8.6 - 10.5 mg/dL    Total Protein 4.8 (L) 6.0 - 8.5 g/dL    Albumin 3.00 (L) 3.50 - 5.20 g/dL    ALT (SGPT) 16 1 - 33 U/L    AST (SGOT) 19 1 - 32 U/L    Alkaline Phosphatase 68 39 - 117 U/L    Total Bilirubin 0.3 0.0 - 1.2 mg/dL    eGFR Non African Amer >150 >60 mL/min/1.73    Globulin 1.8 gm/dL    A/G Ratio 1.7 g/dL    BUN/Creatinine Ratio 22.7 7.0 - 25.0    Anion Gap 8.2 5.0 - 15.0 mmol/L   Troponin    Collection Time: 12/03/21  6:04 PM    Specimen: Blood   Result Value Ref Range    Troponin T 0.013 0.000 - 0.030 ng/mL   Magnesium    Collection Time: 12/03/21  6:04 PM    Specimen: Blood   Result Value Ref Range    Magnesium 1.5 (L) 1.6 - 2.6 mg/dL   CBC Auto Differential    Collection Time: 12/03/21  6:04 PM    Specimen: Blood   Result Value Ref Range    WBC 11.52 (H) 3.40 - 10.80 10*3/mm3    RBC 4.02 3.77 - 5.28 10*6/mm3    Hemoglobin 12.5 12.0 - 15.9 g/dL    Hematocrit 35.9 34.0 - 46.6 %    MCV 89.3 79.0 - 97.0 fL    MCH 31.1 26.6 - 33.0 pg    MCHC 34.8 31.5 - 35.7 g/dL    RDW 11.7 (L) 12.3 - 15.4 %     RDW-SD 37.4 37.0 - 54.0 fl    MPV 10.3 6.0 - 12.0 fL    Platelets 197 140 - 450 10*3/mm3    Neutrophil % 71.0 42.7 - 76.0 %    Lymphocyte % 21.2 19.6 - 45.3 %    Monocyte % 6.1 5.0 - 12.0 %    Eosinophil % 0.0 (L) 0.3 - 6.2 %    Basophil % 0.4 0.0 - 1.5 %    Immature Grans % 1.3 (H) 0.0 - 0.5 %    Neutrophils, Absolute 8.18 (H) 1.70 - 7.00 10*3/mm3    Lymphocytes, Absolute 2.44 0.70 - 3.10 10*3/mm3    Monocytes, Absolute 0.70 0.10 - 0.90 10*3/mm3    Eosinophils, Absolute 0.00 0.00 - 0.40 10*3/mm3    Basophils, Absolute 0.05 0.00 - 0.20 10*3/mm3    Immature Grans, Absolute 0.15 (H) 0.00 - 0.05 10*3/mm3    nRBC 0.0 0.0 - 0.2 /100 WBC   Lipase    Collection Time: 12/03/21  6:04 PM    Specimen: Blood   Result Value Ref Range    Lipase 22 13 - 60 U/L         RADIOLOGY RESULTS  No Radiology Exams Resulted Within Past 24 Hours       PROGRESS, DATA ANALYSIS, CONSULTS AND MEDICAL DECISION MAKING  All labs have been independently reviewed by me.  All radiology studies have been reviewed by me and discussed with radiologist dictating the report.  EKG's independently viewed and interpreted by me unless stated otherwise. Discussion below represents my analysis of pertinent findings related to patient's condition, differential diagnosis, treatment plan and final disposition.     ED Course as of 12/03/21 1947   Fri Dec 03, 2021   1821 Discussed patient with Óscar the [employer] rep.  He states that the patient has antitachycardia pacing which performed correctly but then she had V. tach and was shocked twice at 41 J.  The device is working appropriately and he does not recommend any changes.  I have placed a call to cardiology. [EW]   1832 I discussed patient with Dr. Marshall, Cardiologist.  He will plan to admit as long as labs are WNL.  I have ordered dose of oral metoprolol and he is going to order Amiodarone loading dose.  Pt has tolerated Amiodarone in past.  [EW]   1905 Potassium(!!): 2.6 [EW]   1912 Calcium(!!): 5.9 [EW]    1917 I discussed abnormal labs with Dr. Marshall, will have [facility] admit and cardio as consult.  I have updated patient about the abnormal labs.  [EW]   1924 Potassium(!!): 2.6 [EW]   1924 Calcium(!!): 5.9 [EW]   1924 Magnesium(!): 1.5  Replacement for all the above have been initiated [EW]   1930 Discussed admission with Dr. Oneill, on call for [facility].  He will admit and manage electrolytes.  [EW]      ED Course User Index  [EW] Angelica Poole, APRN     DDX: defibrillation fired, electrolyte abnormality, equipment malfunction, chest pain, pancreatitis    MDM: The patient's defibrillator is working appropriately per the rep.  She has multiple electrolyte abnormalities including hypokalemia, hypocalcemia and hypomagnesemia.  She was started on amiodarone drip as well as with electrolyte replacement and will be admitted to the hospital for further work-up and management.  I did consider pancreatitis due to patient complaining of bandlike chest pain that radiates to her back but her lipase is negative.    Reviewed pt's history and workup with Dr. Arshad.  After a bedside evaluation, Dr. Arshad agrees with the plan of care.     Based on the patient's lab findings and presenting symptoms, the doctor and I feel it is appropriate to admit the patient for further management, evaluation, and treatment.  I have discussed this with the admitting team.  I have also discussed this with the patient/family.  They are in agreement with admission.          Disposition vitals:  /67   Pulse 68   Temp 98 °F (36.7 °C)   Resp 18   LMP 08/23/1997   SpO2 99%   Breastfeeding No       DIAGNOSIS  Final diagnoses:   Hypokalemia   Hypomagnesemia   AICD discharge   Chest pain, unspecified type   Hypocalcemia       Admission     Angelica Poole APRN  12/03/21 1951       Angelica Poole APRN  12/03/21 2227      Electronically signed by Gorge Arshad MD at 12/04/21 0216     Gorge Arshad MD at 12/03/21 1939      Procedure  Orders    1. Critical Care [[id number]] ordered by Gorge Arshad MD MD ATTESTATION NOTE    The ARNOLDO and I have discussed this patient's history, physical exam, and treatment plan.  I have reviewed the documentation and personally had a face to face interaction with the patient. I affirm the documentation and agree with the treatment and plan.  The attached note describes my personal findings.      Tiffani Fiore is a 51 y.o. female who presents to the ED c/o having a syncopal episode.  She reports that she was having a conversation with coworkers when she suddenly lost consciousness.  When she woke up she was staring at the ceiling.  She was told that she had a syncopal episode, turned blue, received CPR and bystanders felt that maybe her pacemaker fired.  She has a history of V. tach.  She is followed by Dr. Yancey.  She has a defibrillator.  She has some central chest pain and nausea.  She vomited prior to arrival.  She reports her chest feels like a band is wrapped around it.      On exam:  GENERAL: Awake, alert, no acute distress  SKIN: Warm, dry  HENT: Normocephalic, atraumatic  EYES: no scleral icterus  CV: regular rhythm, regular rate  RESPIRATORY: normal effort, lungs clear  ABDOMEN: soft, nontender, nondistended  MUSCULOSKELETAL: no deformity  NEURO: alert, moves all extremities, follows commands    Labs  Recent Results (from the past 24 hour(s))   Comprehensive Metabolic Panel    Collection Time: 12/03/21  6:04 PM    Specimen: Blood   Result Value Ref Range    Glucose 67 65 - 99 mg/dL    BUN 10 6 - 20 mg/dL    Creatinine 0.44 (L) 0.57 - 1.00 mg/dL    Sodium 145 136 - 145 mmol/L    Potassium 2.6 (C) 3.5 - 5.2 mmol/L    Chloride 117 (H) 98 - 107 mmol/L    CO2 19.8 (L) 22.0 - 29.0 mmol/L    Calcium 5.9 (C) 8.6 - 10.5 mg/dL    Total Protein 4.8 (L) 6.0 - 8.5 g/dL    Albumin 3.00 (L) 3.50 - 5.20 g/dL    ALT (SGPT) 16 1 - 33 U/L    AST (SGOT) 19 1 - 32 U/L    Alkaline Phosphatase 68 39 - 117  U/L    Total Bilirubin 0.3 0.0 - 1.2 mg/dL    eGFR Non African Amer >150 >60 mL/min/1.73    Globulin 1.8 gm/dL    A/G Ratio 1.7 g/dL    BUN/Creatinine Ratio 22.7 7.0 - 25.0    Anion Gap 8.2 5.0 - 15.0 mmol/L   Troponin    Collection Time: 12/03/21  6:04 PM    Specimen: Blood   Result Value Ref Range    Troponin T 0.013 0.000 - 0.030 ng/mL   Magnesium    Collection Time: 12/03/21  6:04 PM    Specimen: Blood   Result Value Ref Range    Magnesium 1.5 (L) 1.6 - 2.6 mg/dL   CBC Auto Differential    Collection Time: 12/03/21  6:04 PM    Specimen: Blood   Result Value Ref Range    WBC 11.52 (H) 3.40 - 10.80 10*3/mm3    RBC 4.02 3.77 - 5.28 10*6/mm3    Hemoglobin 12.5 12.0 - 15.9 g/dL    Hematocrit 35.9 34.0 - 46.6 %    MCV 89.3 79.0 - 97.0 fL    MCH 31.1 26.6 - 33.0 pg    MCHC 34.8 31.5 - 35.7 g/dL    RDW 11.7 (L) 12.3 - 15.4 %    RDW-SD 37.4 37.0 - 54.0 fl    MPV 10.3 6.0 - 12.0 fL    Platelets 197 140 - 450 10*3/mm3    Neutrophil % 71.0 42.7 - 76.0 %    Lymphocyte % 21.2 19.6 - 45.3 %    Monocyte % 6.1 5.0 - 12.0 %    Eosinophil % 0.0 (L) 0.3 - 6.2 %    Basophil % 0.4 0.0 - 1.5 %    Immature Grans % 1.3 (H) 0.0 - 0.5 %    Neutrophils, Absolute 8.18 (H) 1.70 - 7.00 10*3/mm3    Lymphocytes, Absolute 2.44 0.70 - 3.10 10*3/mm3    Monocytes, Absolute 0.70 0.10 - 0.90 10*3/mm3    Eosinophils, Absolute 0.00 0.00 - 0.40 10*3/mm3    Basophils, Absolute 0.05 0.00 - 0.20 10*3/mm3    Immature Grans, Absolute 0.15 (H) 0.00 - 0.05 10*3/mm3    nRBC 0.0 0.0 - 0.2 /100 WBC   Lipase    Collection Time: 12/03/21  6:04 PM    Specimen: Blood   Result Value Ref Range    Lipase 22 13 - 60 U/L   COVID-19,[facility] IN-HOUSE CEPHEID/SARBJIT NP SWAB IN TRANSPORT MEDIA 8-12 HR TAT - Swab, Nasopharynx    Collection Time: 12/03/21  7:14 PM    Specimen: Nasopharynx; Swab   Result Value Ref Range    COVID19 Not Detected Not Detected - Ref. Range   ECG 12 Lead    Collection Time: 12/03/21 10:43 PM   Result Value Ref Range    QT Interval 469 ms   POC Glucose  Once    Collection Time: 12/03/21 11:42 PM    Specimen: Blood   Result Value Ref Range    Glucose 115 70 - 130 mg/dL       Radiology  XR Chest 1 View    Result Date: 12/3/2021  STAT PORTABLE RADIOGRAPHIC VIEW THE CHEST  CLINICAL HISTORY: Shortness of air.  FINDINGS:  There is a left subclavian approach pacer device with its leads unchanged in position when compared to the prior study dated 02/24/2020. There is elevation of the left hemidiaphragm which was also seen on the prior exam. The cardiomediastinal silhouette is enlarged but stable in size. The lungs are clear of acute infiltrates. The osseous structures are unremarkable.       1. Stable findings when compared to the prior study dated 02/24/2020.  2. Enlarged cardiomediastinal silhouette.  3. Left subclavian approach pacer device.  4. Chronic elevation of the left hemidiaphragm.  This report was finalized on 12/3/2021 9:04 PM by Dr. Garry Oh M.D.        Medical Decision Making:  ED Course as of 12/04/21 0217   Fri Dec 03, 2021   1821 Discussed patient with Óscar the [employer] rep.  He states that the patient has antitachycardia pacing which performed correctly but then she had V. tach and was shocked twice at 41 J.  The device is working appropriately and he does not recommend any changes.  I have placed a call to cardiology. [EW]   1832 I discussed patient with Dr. Marshall, Cardiologist.  He will plan to admit as long as labs are WNL.  I have ordered dose of oral metoprolol and he is going to order Amiodarone loading dose.  Pt has tolerated Amiodarone in past.  [EW]   1905 Potassium(!!): 2.6 [EW]   1912 Calcium(!!): 5.9 [EW]   1917 I discussed abnormal labs with Dr. Marshall, will have [facility] admit and cardio as consult.  I have updated patient about the abnormal labs.  [EW]   1924 Potassium(!!): 2.6 [EW]   1924 Calcium(!!): 5.9 [EW]   1924 Magnesium(!): 1.5  Replacement for all the above have been initiated [EW]   1930 Discussed admission with   Nino, on call for [facility].  He will admit and manage electrolytes.  [EW]   2246 EKG          EKG time: 1745  Rhythm/Rate: Normal sinus, rate 69  P waves and MS: Normal P, normal MS  QRS, axis: Left bundle branch block  ST and T waves: No acute    Interpreted Contemporaneously by me, independently viewed  Not significantly changed compared to prior 2/24/2020   [TR]      ED Course User Index  [EW] Angelica Poole APRN  [TR] Gorge Arshad MD       Plan cardiac work-up including troponins, chemistries, chest x-ray, EKG.  Plan continuous cardiac monitoring.  Plan admission with cardiology consult.    Critical Care  Performed by: Gorge Arshad MD  Authorized by: Gorge Arshad MD     Critical care provider statement:     Critical care time (minutes): 30-74.    Critical care time was exclusive of:  Separately billable procedures and treating other patients    Critical care was necessary to treat or prevent imminent or life-threatening deterioration of the following conditions:  Metabolic crisis and cardiac failure    Critical care was time spent personally by me on the following activities:  Development of treatment plan with patient or surrogate, discussions with consultants, examination of patient, obtaining history from patient or surrogate, ordering and performing treatments and interventions, ordering and review of laboratory studies, ordering and review of radiographic studies, pulse oximetry, re-evaluation of patient's condition and review of old charts          PPE: The patient wore a mask and I wore an N95 mask throughout the entire patient encounter.      The patient has started, but not completed, their COVID-19 vaccination series.    Diagnosis  Final diagnoses:   Hypokalemia   Hypomagnesemia   AICD discharge   Chest pain, unspecified type   Hypocalcemia        Gorge Arshad MD  12/03/21 1941       Gorge Arshad MD  12/04/21 0217      Electronically signed by Gorge Arshad MD at 12/04/21 0217        Vital Signs     Date/Time Temp Temp src Pulse Resp BP Patient Position SpO2    12/06/21 1048 98.3 (36.8) Oral 80 16 93/53 Sitting 95    12/06/21 0740 98.6 (37) Oral 76 16 102/62 Sitting --    12/06/21 0448 -- -- 74 -- 102/59 -- --    12/06/21 0305 98.8 (37.1) Oral 79 18 93/52 Lying 95    12/05/21 2309 98.7 (37.1) Oral 70 18 91/48 Lying 94    12/05/21 1920 98.9 (37.2) Oral 72 18 102/57 Lying 96    12/05/21 1546 98 (36.7) Oral 71 18 88/65 Lying 96    12/05/21 1030 97.4 (36.3) Oral 69 18 92/56 Lying 94    12/05/21 0650 97.7 (36.5) Oral 64 18 105/61 Lying 94    12/05/21 0355 -- -- 75 -- 104/67 -- --    12/05/21 0321 97.7 (36.5) Oral 69 18 97/55 Lying 91    12/04/21 2300 97.7 (36.5) Oral 68 18 102/62 Lying 92    12/04/21 1900 98.8 (37.1) Oral 73 18 94/58 Lying 94    12/04/21 1521 -- -- -- -- -- -- 95    12/04/21 1520 98.8 (37.1) Oral 68 18 107/67 Lying --    12/04/21 1103 98.6 (37) Oral 71 16 88/57 Lying 97    12/04/21 0715 -- -- 75 -- 103/58 -- 95    12/04/21 0612 -- -- 71 -- 103/58 -- --    12/04/21 0449 98 (36.7) Oral 71 18 96/58 Lying 92    12/04/21 0117 -- -- 79 -- 103/63 -- 97    12/03/21 2313 97.9 (36.6) Oral 68 18 94/63 Lying 97    12/03/21 2241 97.9 (36.6) Oral 67 18 108/46 Lying 99    12/03/21 2131 -- -- 64 -- 116/77 -- 94    12/03/21 2031 -- -- 65 -- 110/86 -- 100    12/03/21 1931 -- -- 68 -- 110/67 -- 99    12/03/21 1852 -- -- 67 -- 99/58 -- 98    12/03/21 1722 98 (36.7) -- 84 18 121/76 -- 97            Orders (last 7 days)      Start     Ordered    12/06/21 0600  Magnesium  Morning Draw         12/05/21 1419    12/05/21 1420  Code Status and Medical Interventions:  Continuous         12/05/21 1419    12/05/21 1200  influenza vac split quad (FLUZONE,FLUARIX,AFLURIA,FLULAVAL) injection 0.5 mL  During Hospitalization         12/04/21 0524    12/04/21 1843  acetaminophen (TYLENOL) tablet 500 mg  Every 6 Hours PRN         12/04/21 1844    12/04/21 1130  amiodarone (PACERONE) tablet 400 mg  Every 8 Hours       "   12/04/21 1035    12/04/21 1048  Diet Regular  Diet Effective Now         12/04/21 1048    12/04/21 1018  Troponin  STAT         12/04/21 1017    12/04/21 1015  perflutren (DEFINITY) 8.476 mg in Sodium Chloride (PF) 0.9 % 10 mL injection  Once in Imaging         12/04/21 0927    12/04/21 0900  spironolactone (ALDACTONE) tablet 25 mg  Daily         12/03/21 2242    12/04/21 0900  lisinopril (PRINIVIL,ZESTRIL) tablet 20 mg  Daily         12/03/21 2242 12/04/21 0900  enoxaparin (LOVENOX) syringe 40 mg  Every 24 Hours         12/03/21 2242    12/04/21 0700  carvedilol (COREG) tablet 12.5 mg  Every Morning         12/03/21 2242    12/04/21 0651  POC Glucose Once  PROCEDURE ONCE         12/04/21 0643    12/04/21 0600  ECG 12 Lead  Daily       12/03/21 1829 12/04/21 0600  Comprehensive Metabolic Panel  Morning Draw         12/03/21 2242    12/04/21 0600  CBC Auto Differential  Morning Draw         12/03/21 2242    12/04/21 0600  Magnesium  Morning Draw         12/03/21 2242    12/04/21 0600  Troponin  Morning Draw         12/03/21 2242    12/04/21 0115  amiodarone 360 mg in 200 mL D5W infusion  Continuous,   Status:  Discontinued        \"Followed by\" Linked Group Details    12/03/21 1829    12/04/21 0000  Adult Transthoracic Echo Complete W/ Cont if Necessary Per Protocol  Once        Comments: NICM, ventricular tachycardia, ICD shock    12/03/21 1830    12/04/21 0000  Vital Signs  Every 4 Hours       12/03/21 2242    12/04/21 0000  Basic Metabolic Panel  Every 6 Hours       12/03/21 2242    12/03/21 2344  POC Glucose Once  PROCEDURE ONCE         12/03/21 2342    12/03/21 2330  sodium chloride 0.9 % flush 10 mL  Every 12 Hours Scheduled         12/03/21 2242    12/03/21 2243  Intake & Output  Every Shift       12/03/21 2242    12/03/21 2243  Weigh Patient  Once         12/03/21 2242 12/03/21 2243  Oxygen Therapy- Nasal Cannula; Titrate for SPO2: 90% - 95%  Continuous,   Status:  Canceled         12/03/21 2242    " "12/03/21 2243  Insert Peripheral IV  Once         12/03/21 2242 12/03/21 2243  Saline Lock & Maintain IV Access  Continuous,   Status:  Canceled         12/03/21 2242    12/03/21 2243  Initiate Electrolyte Replacement Protocol  Until Discontinued         12/03/21 2242 12/03/21 2243  Patient Currently On Electrolyte Replacement Protocol - Please Refer to MAR for Protocol Details  Misc Nursing Order (Specify)  Daily      Comments: Patient Currently On Electrolyte Replacement Protocol - Please Refer to MAR for Protocol Details    12/03/21 2242 12/03/21 2243  Telemetry - Maintain IV Access  Continuous         12/03/21 2242 12/03/21 2243  Continuous Cardiac Monitoring  Continuous         12/03/21 2242 12/03/21 2243  May Be Off Telemetry for Tests  Continuous         12/03/21 2242 12/03/21 2243  ACLS Protocol For Life Threatening Dysrhythmias (Unless Code Status Indicates Otherwise)  Continuous         12/03/21 2242 12/03/21 2243  Notify Provider if ACLS Protocol Activated  Until Discontinued         12/03/21 2242 12/03/21 2243  NPO Diet  Diet Effective Now,   Status:  Canceled         12/03/21 2242 12/03/21 2243  Inpatient Cardiology Consult  Once        Specialty:  Cardiology  Provider:  Grayson Yancey MD    12/03/21 2242 12/03/21 2242  potassium chloride (K-DUR,KLOR-CON) ER tablet 40 mEq  As Needed        \"Or\" Linked Group Details    12/03/21 2242 12/03/21 2242  potassium chloride (KLOR-CON) packet 40 mEq  As Needed        \"Or\" Linked Group Details    12/03/21 2242 12/03/21 2242  potassium chloride 10 mEq in 100 mL IVPB  Every 1 Hour PRN        \"Or\" Linked Group Details    12/03/21 2242 12/03/21 2242  Magnesium Sulfate 2 gram Bolus, followed by 8 gram infusion (total Mg dose 10 grams)- Mg less than or equal to 1mg/dL  As Needed        \"Or\" Linked Group Details    12/03/21 2242 12/03/21 2242  Magnesium Sulfate 2 gram / 50mL Infusion (GIVE X 3 BAGS TO EQUAL 6GM TOTAL DOSE) - Mg " "1.1 - 1.5 mg/dl  As Needed        \"Or\" Linked Group Details    12/03/21 2242    12/03/21 2242  Magnesium Sulfate 4 gram infusion- Mg 1.6-1.9 mg/dL  As Needed        \"Or\" Linked Group Details    12/03/21 2242    12/03/21 2242  sodium chloride 0.9 % flush 10 mL  As Needed         12/03/21 2242 12/03/21 2242  ondansetron (ZOFRAN) injection 4 mg  Every 6 Hours PRN         12/03/21 2242 12/03/21 2242  nitroglycerin (NITROSTAT) SL tablet 0.4 mg  Every 5 Minutes PRN         12/03/21 2242 12/03/21 2022  Inpatient Admission  Once         12/03/21 2022 12/03/21 1942  Critical Care  Once        Comments: This order was created via procedure documentation    12/03/21 1941 12/03/21 1940  lidocaine (XYLOCAINE) 1 % injection 10 mL  Once         12/03/21 1938    12/03/21 1926  calcium gluconate 1g/50ml 0.675% NaCl IV SOLN  Once         12/03/21 1924    12/03/21 1925  XR Chest 1 View  1 Time Imaging         12/03/21 1924 12/03/21 1925  Critical Care  Once        Comments: This order was created via procedure documentation    12/03/21 1924 12/03/21 1914  [facility] (on-call MD unless specified) Details  Once        Specialty:  Hospitalist  Provider:  (Not yet assigned)    12/03/21 1913    12/03/21 1910  ondansetron (ZOFRAN) injection 4 mg  Once         12/03/21 1908    12/03/21 1910  morphine injection 2 mg  Once         12/03/21 1908    12/03/21 1909  potassium chloride (K-DUR,KLOR-CON) ER tablet 40 mEq  Once         12/03/21 1907 12/03/21 1908  Patient Currently On Electrolyte Replacement Protocol - Please Refer to MAR for Protocol Details  Misc Nursing Order (Specify)  Daily      Comments: Patient Currently On Electrolyte Replacement Protocol - Please Refer to MAR for Protocol Details    12/03/21 1907    12/03/21 1907  potassium chloride 10 mEq in 100 mL IVPB  Every 1 Hour PRN         12/03/21 1907    12/03/21 1907  Initiate Electrolyte Replacement Protocol  Until Discontinued         12/03/21 1907 12/03/21 " "1904  magnesium sulfate 2g/50 mL (PREMIX) infusion  Once         12/03/21 1902    12/03/21 1834  metoprolol tartrate (LOPRESSOR) tablet 12.5 mg  Once         12/03/21 1832    12/03/21 1832  Lipase  STAT         12/03/21 1831    12/03/21 1832  COVID PRE-OP / PRE-PROCEDURE SCREENING ORDER (NO ISOLATION) - Swab, Nasopharynx  Once         12/03/21 1831    12/03/21 1832  COVID-19, [facility] IN-HOUSE CEPHEID/SARBJIT NP SWAB IN TRANSPORT MEDIA 8-12 HR TAT - Swab, Nasopharynx  PROCEDURE ONCE         12/03/21 1832    12/03/21 1831  amiodarone in dextrose 5% (NEXTERONE) loading dose 150mg/100mL  Once        \"Followed by\" Linked Group Details    12/03/21 1829    12/03/21 1831  amiodarone 360 mg in 200 mL D5W infusion  Continuous        \"Followed by\" Linked Group Details    12/03/21 1829    12/03/21 1828  Initiate Amiodarone Protocol  Until Discontinued         12/03/21 1829    12/03/21 1828  Monitor QTc  Every Shift       12/03/21 1829    12/03/21 1828  Notify Provider - QTc 500 milliseconds or Greater  Until Discontinued         12/03/21 1829    12/03/21 1828  ECG 12 Lead  STAT         12/03/21 1829    12/03/21 1813  [facility] (on-call MD unless specified)  Once        Specialty:  Cardiology  Provider:  Jairo Marshall MD    12/03/21 1812    12/03/21 1737  CBC & Differential  Once         12/03/21 1737    12/03/21 1737  Comprehensive Metabolic Panel  Once         12/03/21 1737    12/03/21 1737  Troponin  Once         12/03/21 1737    12/03/21 1737  Magnesium  STAT         12/03/21 1737    12/03/21 1737  Insert peripheral IV  Once        \"And\" Linked Group Details    12/03/21 1737    12/03/21 1737  ECG 12 Lead  Once         12/03/21 1737    12/03/21 1737  Device Interrogation. Device type? Pacemaker; Company to contact? Unknown (Staff to obtain from patient)  Once         12/03/21 1737    12/03/21 1737  CBC Auto Differential  PROCEDURE ONCE         12/03/21 1737    12/03/21 1736  sodium chloride 0.9 % flush 10 mL  As Needed      " "  \"And\" Linked Group Details    21 1737    Unscheduled  Magnesium  As Needed       21 1907    Unscheduled  Potassium  As Needed       21    Unscheduled  Magnesium  As Needed       21    Unscheduled  Potassium  As Needed       21    Unscheduled  Telemetry - Pulse Oximetry  Continuous PRN      Comments: If Patient Develops Unresponsiveness, Acute Dyspnea, Cyanosis or Suspected Hypoxemia Start Continuous Pulse Ox Monitoring, Apply Oxygen & Notify Provider    21 224    Unscheduled  Oxygen Therapy- Nasal Cannula; Titrate for SPO2: 90% - 95%  Continuous PRN      Comments: If Patient Develops Unresponsiveness, Acute Dyspnea, Cyanosis or Suspected Hypoxemia Start Continuous Pulse Ox Monitoring, Apply Oxygen & Notify Provider    21    Unscheduled  ECG 12 Lead  As Needed      Comments: Nurse to Release if Patient Expericences Acute Chest Pain or Dysrhythmias    21    Unscheduled  Potassium  As Needed      Comments: For Ventricular Arrhythmias      212    Unscheduled  Magnesium  As Needed      Comments: For Ventricular Arrhythmias      212    Unscheduled  Troponin  As Needed      Comments: For Chest Pain      21    Unscheduled  Digoxin Level  As Needed      Comments: For Atrial Arrhythmias      21    Unscheduled  Blood Gas, Arterial -  As Needed      Comments: Per O2 PolicyNotify Physician      21                   Physician Progress Notes       Dominick Clancy MD at 21 1417              Name: Tiffani Fiore ADMIT: 12/3/2021   : 1970  PCP: Provider, No Known    MRN: [id number] LOS: 2 days   AGE/SEX: 51 y.o. female  ROOM: [facility]     Subjective   Subjective   No new complaints.     Review of Systems   Constitutional: Negative for fever.   Respiratory: Negative for cough and shortness of breath.    Cardiovascular: Negative for leg swelling.   Gastrointestinal: Negative for abdominal " pain.     Objective   Objective   Vital Signs  Temp:  [97.4 °F (36.3 °C)-98.8 °F (37.1 °C)] 97.4 °F (36.3 °C)  Heart Rate:  [64-75] 69  Resp:  [18] 18  BP: ()/(55-67) 92/56  SpO2:  [91 %-95 %] 94 %  on   ;   Device (Oxygen Therapy): room air  Body mass index is 33.06 kg/m².    Physical Exam  Constitutional:       General: She is not in acute distress.     Appearance: Normal appearance. She is not toxic-appearing.   HENT:      Head: Normocephalic and atraumatic.   Cardiovascular:      Rate and Rhythm: Normal rate and regular rhythm.   Pulmonary:      Effort: Pulmonary effort is normal. No respiratory distress.      Breath sounds: Normal breath sounds. No wheezing or rhonchi.   Abdominal:      General: Bowel sounds are normal. There is no distension.      Palpations: Abdomen is soft.      Tenderness: There is no abdominal tenderness. There is no guarding or rebound.   Musculoskeletal:         General: No swelling.      Right lower leg: No edema.      Left lower leg: No edema.   Skin:     General: Skin is warm and dry.   Neurological:      General: No focal deficit present.      Mental Status: She is alert and oriented to person, place, and time.   Psychiatric:         Mood and Affect: Mood normal.         Behavior: Behavior normal.     Results Review  I reviewed the patient's new clinical results.  Results from last 7 days   Lab Units 12/04/21  0501 12/03/21  1804   WBC 10*3/mm3 10.53 11.52*   HEMOGLOBIN g/dL 12.4 12.5   PLATELETS 10*3/mm3 202 197     Results from last 7 days   Lab Units 12/05/21  0725 12/05/21  0447 12/04/21  1815 12/04/21  1101   SODIUM mmol/L 133* 138 138 137   POTASSIUM mmol/L 4.4 4.2 4.5 5.0   CHLORIDE mmol/L 101 104 104 104   CO2 mmol/L 25.3 23.0 24.2 24.5   BUN mg/dL 12 12 12 10   CREATININE mg/dL 0.87 0.82 0.92 0.73   GLUCOSE mg/dL 127* 99 118* 97     Lab Results   Component Value Date    ANIONGAP 6.7 12/05/2021     Estimated Creatinine Clearance: 84.7 mL/min (by C-G formula based on SCr  of 0.87 mg/dL).    Results from last 7 days   Lab Units 12/04/21  0501 12/03/21  1804   ALBUMIN g/dL 4.30 3.00*   BILIRUBIN mg/dL 0.3 0.3   ALK PHOS U/L 99 68   AST (SGOT) U/L 20 19   ALT (SGPT) U/L 21 16     Results from last 7 days   Lab Units 12/05/21  0725 12/05/21  0447 12/04/21  1815 12/04/21  1101 12/04/21  0501 12/04/21  0501 12/03/21  1804 12/03/21  1804   CALCIUM mg/dL 9.1 8.9 8.9 9.1   < > 8.6   < > 5.9*   ALBUMIN g/dL  --   --   --   --   --  4.30  --  3.00*   MAGNESIUM mg/dL  --   --   --   --   --  2.4  --  1.5*    < > = values in this interval not displayed.       Glucose   Date/Time Value Ref Range Status   12/04/2021 0643 110 70 - 130 mg/dL Final     Comment:     Meter: [id number] : [id number] Zain Pandya AARON   12/03/2021 2342 115 70 - 130 mg/dL Final     Comment:     Meter: [id number] : [id number] Efrain Contreras RN       Scheduled Meds  amiodarone, 400 mg, Oral, Q8H  carvedilol, 12.5 mg, Oral, QAM  enoxaparin, 40 mg, Subcutaneous, Q24H  lisinopril, 20 mg, Oral, Daily  sodium chloride, 10 mL, Intravenous, Q12H  spironolactone, 25 mg, Oral, Daily    Continuous Infusions   PRN Meds  •  acetaminophen  •  influenza vaccine  •  magnesium sulfate **OR** magnesium sulfate **OR** magnesium sulfate  •  nitroglycerin  •  ondansetron  •  potassium chloride **OR** potassium chloride **OR** potassium chloride  •  potassium chloride  •  [COMPLETED] Insert peripheral IV **AND** sodium chloride  •  sodium chloride     Diet  Diet Regular       Lab Results   Lab Value Date/Time    TROPONINT 0.086 (C) 12/04/2021 1101    TROPONINT 0.118 (C) 12/04/2021 0501    TROPONINT 0.013 12/03/2021 2077       Assessment/Plan     Active Hospital Problems    Diagnosis  POA   • **ICD (implantable cardioverter-defibrillator) discharge [Z45.02]  Not Applicable   • Hypokalemia [E87.6]  Unknown   • Hypomagnesemia [E83.42]  Unknown   • AICD discharge [Z45.02]  Not Applicable   • Hypertension [I10]  Unknown   • Ventricular  "tachycardia (HCC) [I47.2]  Yes   • S/P ICD (internal cardiac defibrillator) procedure [Z95.810]  Yes   • Cardiomyopathy, nonischemic (HCC) [I42.8]  Yes      Resolved Hospital Problems   No resolved problems to display.     51 y.o. female admitted with ICD (implantable cardioverter-defibrillator) discharge. History of nonischemic cardiomyopathy    · Amiodarone per cardiology. EP evaluation  · Elevated troponin from V. tach and shock  · K and Mag normal continue to monitor  · Lovenox 40 mg SC daily for DVT prophylaxis  · Discussed with patient  · Discharge: TBD    Dominick Clancy MD  [facility]  12/05/21  14:17 EST    I wore protective equipment throughout this patient encounter including a face mask, gloves, and protective eyewear.  Hand hygiene was performed before donning protective equipment and after removal when leaving the room.      Electronically signed by Dominick Clancy MD at 12/05/21 1420     Smith Valentin MD at 12/05/21 1025          Patient Care Team:  Provider, No Known as PCP - General  Provider, No Known as PCP - Family Medicine    Chief Complaint: Follow-up VT.    Interval History:   Resting comfortably.  No new complaints.  Chest soreness.    Objective   Vital Signs  Temp:  [97.7 °F (36.5 °C)-98.8 °F (37.1 °C)] 97.7 °F (36.5 °C)  Heart Rate:  [64-75] 64  Resp:  [16-18] 18  BP: ()/(55-67) 105/61    Intake/Output Summary (Last 24 hours) at 12/5/2021 1025  Last data filed at 12/5/2021 0800  Gross per 24 hour   Intake 360 ml   Output --   Net 360 ml     Flowsheet Rows      First Filed Value   Admission Height 165.1 cm (65\") Documented at 12/03/2021 2241   Admission Weight 89.9 kg (198 lb 3.1 oz) Documented at 12/03/2021 2241          Temp:  [97.7 °F (36.5 °C)-98.8 °F (37.1 °C)] 97.7 °F (36.5 °C)  Heart Rate:  [64-75] 64  Resp:  [16-18] 18  BP: ()/(55-67) 105/61    Intake/Output Summary (Last 24 hours) at 12/5/2021 1025  Last data filed at 12/5/2021 " "0800  Gross per 24 hour   Intake 360 ml   Output --   Net 360 ml     Flowsheet Rows      First Filed Value   Admission Height 165.1 cm (65\") Documented at 12/03/2021 2241   Admission Weight 89.9 kg (198 lb 3.1 oz) Documented at 12/03/2021 2241          General Appearance:    Alert, cooperative, in no acute distress   Head:    Normocephalic, without obvious abnormality, atraumatic       Neck/Lymph   No adenopathy, supple, no thyromegaly, no carotid bruit, no    JVD   Lungs:     Clear to auscultation bilaterally, no wheezes, rales, or     rhonchi    Cardiac:    Normal rate, regular rhythm, no murmur, no rub, no gallop   Chest Wall:    No abnormalities observed   GI:     Normal bowel sounds, soft, nontender, nondistended,            no rebound tenderness   Extremities:   No cyanosis, clubbing, or edema   Circulatory/Peripheral Vascular :   Pulses palpable and equal bilaterally   Integumentary:   No bleeding or rash. Normal temperature       Neurologic:   Cranial nerves 2 - 12 grossly intact, sensation intact              amiodarone, 400 mg, Oral, Q8H  carvedilol, 12.5 mg, Oral, QAM  enoxaparin, 40 mg, Subcutaneous, Q24H  lisinopril, 20 mg, Oral, Daily  sodium chloride, 10 mL, Intravenous, Q12H  spironolactone, 25 mg, Oral, Daily        amiodarone, 0.5 mg/min, Last Rate: 0.5 mg/min (12/05/21 0808)        Results Review:    Results from last 7 days   Lab Units 12/05/21  0725   SODIUM mmol/L 133*   POTASSIUM mmol/L 4.4   CHLORIDE mmol/L 101   CO2 mmol/L 25.3   BUN mg/dL 12   CREATININE mg/dL 0.87   GLUCOSE mg/dL 127*   CALCIUM mg/dL 9.1     Results from last 7 days   Lab Units 12/04/21  1101 12/04/21  0501 12/03/21  1804   TROPONIN T ng/mL 0.086* 0.118* 0.013     Results from last 7 days   Lab Units 12/04/21  0501   WBC 10*3/mm3 10.53   HEMOGLOBIN g/dL 12.4   HEMATOCRIT % 36.2   PLATELETS 10*3/mm3 202             Results from last 7 days   Lab Units 12/04/21  0501   MAGNESIUM mg/dL 2.4         @LABRCNT(bnp)@  I reviewed " the patient's new clinical results.  I personally viewed and interpreted the patient's EKG/Telemetry data        Assessment/Plan   1.  Nonischemic cardiomyopathy  2.  Ventricular tachycardia: No additional episodes  3.  Essential hypertension: Controlled  4.  ICD discharge  5.  Abnormal labs: Currently normal     Plan:   -Patient had ventricular tachycardia requiring defibrillation.  She has a mildly elevated troponin but is chest pain-free.  This is likely secondary to the V. tach and her shock.  I will cycle her enzymes to ensure downtrending.  That being said she has had a troponin greater than 1 in 2017 with normal coronary arteries.  I do not think repeat coronary angiography will be indicated.  -Troponin downtrending.  -I have had a discussion with the patient's electrophysiologist and he has recommended keeping her over the weekend and continuing loading her with p.o. amiodarone.  I will continue every 8 hour dosing.  IV amiodarone will be discontinued today.                    Electronically signed by Smith Valentin MD at 21 1027     Dominick Clancy MD at 21 2449              Name: Tiffani Fiore ADMIT: 12/3/2021   : 1970  PCP: Provider, No Known    MRN: [id number] LOS: 1 days   AGE/SEX: 51 y.o. female  ROOM: 5/     Subjective   Subjective   No new complaints. She has still sore from CPR.    Review of Systems   Constitutional: Negative for fever.   Respiratory: Negative for cough and shortness of breath.    Cardiovascular: Negative for leg swelling.   Gastrointestinal: Negative for abdominal pain.     Objective   Objective   Vital Signs  Temp:  [97.9 °F (36.6 °C)-98.6 °F (37 °C)] 98.6 °F (37 °C)  Heart Rate:  [64-84] 71  Resp:  [16-18] 16  BP: ()/(46-86) 88/57  SpO2:  [92 %-100 %] 97 %  on   ;   Device (Oxygen Therapy): room air  Body mass index is 33.06 kg/m².    Physical Exam  Constitutional:       General: She is not in acute distress.     Appearance: Normal  appearance. She is not toxic-appearing.   HENT:      Head: Normocephalic and atraumatic.   Cardiovascular:      Rate and Rhythm: Normal rate and regular rhythm.   Pulmonary:      Effort: Pulmonary effort is normal. No respiratory distress.      Breath sounds: Normal breath sounds. No wheezing or rhonchi.   Abdominal:      General: Bowel sounds are normal. There is no distension.      Palpations: Abdomen is soft.      Tenderness: There is no abdominal tenderness. There is no guarding or rebound.   Musculoskeletal:         General: No swelling.      Right lower leg: No edema.      Left lower leg: No edema.   Skin:     General: Skin is warm and dry.   Neurological:      General: No focal deficit present.      Mental Status: She is alert and oriented to person, place, and time.   Psychiatric:         Mood and Affect: Mood normal.         Behavior: Behavior normal.     Results Review  I reviewed the patient's new clinical results.  Results from last 7 days   Lab Units 12/04/21  0501 12/03/21  1804   WBC 10*3/mm3 10.53 11.52*   HEMOGLOBIN g/dL 12.4 12.5   PLATELETS 10*3/mm3 202 197     Results from last 7 days   Lab Units 12/04/21  1101 12/04/21  0501 12/03/21  1804   SODIUM mmol/L 137 137 145   POTASSIUM mmol/L 5.0 5.0 2.6*   CHLORIDE mmol/L 104 102 117*   CO2 mmol/L 24.5 23.7 19.8*   BUN mg/dL 10 10 10   CREATININE mg/dL 0.73 0.59 0.44*   GLUCOSE mg/dL 97 97 67     Lab Results   Component Value Date    ANIONGAP 8.5 12/04/2021     Estimated Creatinine Clearance: 100.9 mL/min (by C-G formula based on SCr of 0.73 mg/dL).    Results from last 7 days   Lab Units 12/04/21  0501 12/03/21  1804   ALBUMIN g/dL 4.30 3.00*   BILIRUBIN mg/dL 0.3 0.3   ALK PHOS U/L 99 68   AST (SGOT) U/L 20 19   ALT (SGPT) U/L 21 16     Results from last 7 days   Lab Units 12/04/21  1101 12/04/21  0501 12/03/21  1804   CALCIUM mg/dL 9.1 8.6 5.9*   ALBUMIN g/dL  --  4.30 3.00*   MAGNESIUM mg/dL  --  2.4 1.5*       Glucose   Date/Time Value Ref Range  Status   12/04/2021 0643 110 70 - 130 mg/dL Final     Comment:     Meter: [id number] : [id number] Zain WALKER   12/03/2021 2342 115 70 - 130 mg/dL Final     Comment:     Meter: [id number] : [id number] Efrain Contreras RN       Scheduled Meds  amiodarone, 400 mg, Oral, Q8H  carvedilol, 12.5 mg, Oral, QAM  enoxaparin, 40 mg, Subcutaneous, Q24H  lisinopril, 20 mg, Oral, Daily  sodium chloride, 10 mL, Intravenous, Q12H  spironolactone, 25 mg, Oral, Daily    Continuous Infusions  amiodarone, 0.5 mg/min, Last Rate: 0.5 mg/min (12/04/21 1112)    PRN Meds  [START ON 12/5/2021] influenza vaccine  •  magnesium sulfate **OR** magnesium sulfate **OR** magnesium sulfate  •  nitroglycerin  •  ondansetron  •  potassium chloride **OR** potassium chloride **OR** potassium chloride  •  potassium chloride  •  [COMPLETED] Insert peripheral IV **AND** sodium chloride  •  sodium chloride    amiodarone, 0.5 mg/min, Last Rate: 0.5 mg/min (12/04/21 1112)    Diet  Diet Regular     I have personally reviewed:  [x]  Laboratory   [x]  Microbiology   [x]  Radiology   [x]  EKG/Telemetry   [x]  Cardiology/Vascular   []  Pathology    [x]  Some old records      Assessment/Plan     Active Hospital Problems    Diagnosis  POA   • **ICD (implantable cardioverter-defibrillator) discharge [Z45.02]  Not Applicable   • Hypokalemia [E87.6]  Unknown   • Hypomagnesemia [E83.42]  Unknown   • AICD discharge [Z45.02]  Not Applicable   • Hypertension [I10]  Unknown   • Ventricular tachycardia (HCC) [I47.2]  Yes   • S/P ICD (internal cardiac defibrillator) procedure [Z95.810]  Yes   • Cardiomyopathy, nonischemic (HCC) [I42.8]  Yes      Resolved Hospital Problems   No resolved problems to display.     51 y.o. female admitted with ICD (implantable cardioverter-defibrillator) discharge. History of nonischemic cardiomyopathy    · Amiodarone per cardiology. EP evaluation  · Elevated troponin from V. tach and shock   · K and Mag normal continue to  monitor  · Lovenox 40 mg SC daily for DVT prophylaxis  · Discussed with patient  · Discharge: TBD    Dominick Clancy MD  [facility]  21  15:08 EST    I wore protective equipment throughout this patient encounter including a face mask, gloves, and protective eyewear.  Hand hygiene was performed before donning protective equipment and after removal when leaving the room.      Electronically signed by Dominick Clancy MD at 21 0742          Consult Notes (last 7 days)      Smith Valentin MD at 21 0741      Consult Orders    1. Inpatient Cardiology Consult [[id number]] ordered by Ray Oneill MD at 21                        Patient Name: Tiffani Fiore  Age/Sex: 51 y.o. female  : 1970  MRN: [id number]    Date of Admission: 12/3/2021  Date of Encounter Visit: 21  Encounter Provider: Smith Valentin MD  Place of Service: [facility] CARDIOLOGY      Referring Provider: Ray Oneill MD  Patient Care Team:  Provider, No Known as PCP - General  Provider, No Known as PCP - Family Medicine    Subjective:     Admitted/Consulted for: AICD discharge    Chief Complaint: AICD discharge    History of Present Illness:  51 y.o. female with a history of nonischemic cardiomyopathy with left ventricular ejection fraction of 33%, paroxysmal atrial tachycardia, ventricular tachycardia s/p AICD placement, and pulmonary embolism. She is a patient of Dr. Eubanks.    She presented to the emergency room after she had her AICD fired while she was at work. She said she felt the abnormal sensation in her chest that she gets prior to her AICD firing, and then she woke up on the floor with her coworkers surrounding her. According to her coworkers, she turned blue and jerked. Per patient, her AICD has not discharged in two years. Prior to arrival, patient vomited multiple times and reported having moderate chest pain described as a  band squeezing around her chest. Patient believes the discomfort is related to her defibrillator discharging.     EKG in the ER showed patient to be in SR with a LBBB, unchanged from previous. Her labs were unremarkable, with th exception of her troponin which was elevated at 0.118.  She denies any chest pain currently.  Previously with ventricular tachycardia even in 2017 she has had troponin greater than 1 with normal coronaries.    Previous testing:   ECHO 2/25/20  Left ventricular systolic function is moderately decreased. Calculated EF = 33%. Estimated EF was in agreement with the calculated EF. The left ventricular cavity is moderately dilated. Left ventricular diastolic dysfunction is noted (grade I) consistent with impaired relaxation. Septal dyssynchrony is noted.  Mild-to-moderate mitral valve regurgitation is present.  Calculated right ventricular systolic pressure from tricuspid regurgitation is 35.9 mmHg. Mild pulmonary hypertension is present.    Cardiac catheterization 8/30/17  FINDINGS:     LEFT VENTRICULOGRAPHY: The LV pressure was 100/10 .  There was left ventricular dilatation and severely reduced global LV systolic function without segmental wall motion abnormality.  There was no mitral insufficiency or gradient across the aortic valve on pullback.     CORONARY ANGIOGRAPHY:  Angiographically normal coronary arteries in a right dominant fashion     SUMMARY: Nonischemic cardiomyopathy      Past Medical History:  Past Medical History:   Diagnosis Date   • Cardiomyopathy ([facility]) 2012   • Chest pain    • CHF (congestive heart failure) ([facility])    • Clostridium difficile colitis 2013   • Depression    • DVT (deep venous thrombosis) ([facility]) 2012   • Dyspnea    • Fatigue    • History of paroxysmal atrial tachycardia    • History of transfusion    • Hyperlipidemia    • Hypertension    • ICD (implantable cardioverter-defibrillator) in place     LEFT SUBCLAVIAN AREA   • Ischemic cardiomyopathy    • Migraine    •  Myocardial infarction (HCC)    • Obesity due to excess calories    • Overweight (BMI 25.0-29.9)    • Palpitations     ICD   • PONV (postoperative nausea and vomiting)    • Pulmonary embolism ([facility])    • S/P ICD (internal cardiac defibrillator) procedure    • SVT (supraventricular tachycardia) ([facility])    • Tachycardia    • VT (ventricular tachycardia) ([facility])     recurrent       Past Surgical History:   Procedure Laterality Date   • ABLATION OF DYSRHYTHMIC FOCUS  2018    Multiple VT ablations, last on at -Dr Levy, epicardial VT 2018   • BRONCHOSCOPY N/A 3/8/2018    Procedure: BRONCHOSCOPY WITH BAL;  Surgeon: Berto Nicholson MD;  Location: [facility] ENDOSCOPY;  Service:    • CARDIAC CATHETERIZATION N/A 2017    Procedure: Left Heart Cath;  Surgeon: Saúl Ann MD;  Location: [facility] CATH INVASIVE LOCATION;  Service:    • CARDIAC CATHETERIZATION N/A 2017    Procedure: Left ventriculography;  Surgeon: Saúl Ann MD;  Location: [facility] INVASIVE LOCATION;  Service:    • CARDIAC CATHETERIZATION N/A 2017    Procedure: Coronary angiography;  Surgeon: Saúl Ann MD;  Location: [facility] CATH INVASIVE LOCATION;  Service:    • CARDIAC DEFIBRILLATOR PLACEMENT     • CARDIAC DEFIBRILLATOR PLACEMENT      CONNER DID LEAD REVISION WITH REPLACEMENT   • CARDIAC DEFIBRILLATOR PLACEMENT     • CARDIAC DEFIBRILLATOR PLACEMENT     • CARDIAC ELECTROPHYSIOLOGY PROCEDURE N/A 2017    Procedure: Ablation VT  PATIENT HAS [place]OST[place] ICD;  Surgeon: Grayson Yancey MD;  Location: [facility] INVASIVE LOCATION;  Service:    •  SECTION     • HYSTERECTOMY     • IMPLANTABLE CARDIOVERTER DEFIBRILLATOR LEAD REPLACEMENT/POCKET REVISION N/A 10/20/2017    Procedure: ICD LEAD EXTRACTION TRANSVENOUS AND PLACEMENT OF RV AND LV LEAD;  Surgeon: Amrit Salgado MD;  Location: [facility] OR ;  Service:    • TUBAL ABDOMINAL LIGATION         Home Medications:   Medications Prior to Admission    Medication Sig Dispense Refill Last Dose   • carvedilol (COREG) 6.25 MG tablet TAKE TWO TABLETS BY MOUTH EVERY MORNING 180 tablet 1 2021 at 1500   • lisinopril (PRINIVIL,ZESTRIL) 20 MG tablet TAKE ONE TABLET BY MOUTH DAILY 90 tablet 1 2021 at 1500   • spironolactone (ALDACTONE) 25 MG tablet Take 1 tablet by mouth Daily. 30 tablet 11 2021 at 1500       Allergies:  No Known Allergies    Past Social History:  Social History     Socioeconomic History   • Marital status: Single   Tobacco Use   • Smoking status: Former Smoker     Packs/day: 0.50     Years: 15.00     Pack years: 7.50     Types: Cigarettes     Quit date:      Years since quittin.9   • Smokeless tobacco: Former User   • Tobacco comment: quit age 42   Vaping Use   • Vaping Use: Never used   Substance and Sexual Activity   • Alcohol use: No   • Drug use: No   • Sexual activity: Defer        Past Family History:  Family History   Problem Relation Age of Onset   • Diabetes Father    • Cancer Father         lung   • COPD Mother    • Malig Hyperthermia Neg Hx          Review of Systems:  All systems reviewed. Pertinent positives identified in HPI. All other systems are negative.          Objective:     Objective:  Temp:  [97.9 °F (36.6 °C)-98 °F (36.7 °C)] 98 °F (36.7 °C)  Heart Rate:  [64-84] 75  Resp:  [18] 18  BP: ()/(46-86) 103/58    Intake/Output Summary (Last 24 hours) at 2021 1016  Last data filed at 2021 0736  Gross per 24 hour   Intake 300 ml   Output --   Net 300 ml     Body mass index is 33.06 kg/m².      21  2241 21  0715   Weight: 89.9 kg (198 lb 3.1 oz) 90 kg (198 lb 6.6 oz)           Physical Exam:   Temp:  [97.9 °F (36.6 °C)-98 °F (36.7 °C)] 98 °F (36.7 °C)  Heart Rate:  [64-84] 75  Resp:  [18] 18  BP: ()/(46-86) 103/58    Intake/Output Summary (Last 24 hours) at 2021 1016  Last data filed at 2021 0736  Gross per 24 hour   Intake 300 ml   Output --   Net 300 ml     Flowsheet Rows   "    First Filed Value   Admission Height 165.1 cm (65\") Documented at 12/03/2021 2241   Admission Weight 89.9 kg (198 lb 3.1 oz) Documented at 12/03/2021 2241          General Appearance:    Alert, cooperative, in no acute distress   Head:    Normocephalic, without obvious abnormality, atraumatic       Neck/Lymph   No adenopathy, supple, no thyromegaly, no carotid bruit, no    JVD   Lungs:     Clear to auscultation bilaterally, no wheezes, rales, or     rhonchi    Cardiac:    Normal rate, regular rhythm, no murmur, no rub, no gallop   Chest Wall:    No abnormalities observed   GI:     Normal bowel sounds, soft, nontender, nondistended,            no rebound tenderness   Extremities:   No cyanosis, clubbing, or edema   Circulatory/Peripheral Vascular :   Pulses palpable and equal bilaterally   Integumentary:   No bleeding or rash. Normal temperature       Neurologic:   Cranial nerves 2 - 12 grossly intact, sensation intact               Lab Review:     Results from last 7 days   Lab Units 12/04/21  0501 12/03/21  1804 12/03/21  1804   SODIUM mmol/L 137  --  145   POTASSIUM mmol/L 5.0  --  2.6*   CHLORIDE mmol/L 102  --  117*   CO2 mmol/L 23.7  --  19.8*   BUN mg/dL 10  --  10   CREATININE mg/dL 0.59  --  0.44*   GLUCOSE mg/dL 97   < > 67   CALCIUM mg/dL 8.6  --  5.9*    < > = values in this interval not displayed.       Results from last 7 days   Lab Units 12/04/21  0501 12/03/21  1804   TROPONIN T ng/mL 0.118* 0.013     Results from last 7 days   Lab Units 12/04/21  0501   WBC 10*3/mm3 10.53   HEMOGLOBIN g/dL 12.4   HEMATOCRIT % 36.2   PLATELETS 10*3/mm3 202             Results from last 7 days   Lab Units 12/04/21  0501   MAGNESIUM mg/dL 2.4                       Imaging:    Imaging Results (Most Recent)     Procedure Component Value Units Date/Time    XR Chest 1 View [[id number]] Collected: 12/03/21 2047     Updated: 12/03/21 2107    Narrative:      STAT PORTABLE RADIOGRAPHIC VIEW THE CHEST     CLINICAL HISTORY: " Shortness of air.     FINDINGS:      There is a left subclavian approach pacer device with its leads  unchanged in position when compared to the prior study dated 02/24/2020.  There is elevation of the left hemidiaphragm which was also seen on the  prior exam. The cardiomediastinal silhouette is enlarged but stable in  size. The lungs are clear of acute infiltrates. The osseous structures  are unremarkable.       Impression:         1. Stable findings when compared to the prior study dated 02/24/2020.     2. Enlarged cardiomediastinal silhouette.     3. Left subclavian approach pacer device.     4. Chronic elevation of the left hemidiaphragm.     This report was finalized on 12/3/2021 9:04 PM by Dr. Garry Oh M.D.             Results for orders placed during the hospital encounter of 02/24/20    Adult Transthoracic Echo Complete W/ Cont if Necessary Per Protocol    Interpretation Summary  · Left ventricular systolic function is moderately decreased. Calculated EF = 33%. Estimated EF was in agreement with the calculated EF. The left ventricular cavity is moderately dilated. Left ventricular diastolic dysfunction is noted (grade I) consistent with impaired relaxation. Septal dyssynchrony is noted.  · Mild-to-moderate mitral valve regurgitation is present.  · Calculated right ventricular systolic pressure from tricuspid regurgitation is 35.9 mmHg. Mild pulmonary hypertension is present.      EKG:       BASELINE:       I personally viewed and interpreted the patient's EKG/Telemetry data.    Assessment:   Assessment/Plan   1.  Nonischemic cardiomyopathy  2.  Ventricular tachycardia  3.  Essential hypertension  4.  ICD discharge  5.  Abnormal labs    Plan:   -Patient had ventricular tachycardia requiring defibrillation.  She has a mildly elevated troponin but is chest pain-free.  This is likely secondary to the V. tach and her shock.  I will cycle her enzymes to ensure downtrending.  That being said she has had a  troponin greater than 1 in 2017 with normal coronary arteries.  I do not think repeat coronary angiography will be indicated.  -She is on amiodarone drip and I will discuss with the electrophysiology team.  My suggestion at least today would be to load her with p.o. amiodarone and leave the amiodarone drip on.  We may be able to stop this tomorrow or Monday morning to let her get out of here.  -I think that her lab work in the ER is inaccurate.  If you look through that her calcium is significantly off along with her albumin in addition to her potassium.  After giving her potassium her potassium high at 5.  I think it is very likely that her labs in the ER should have been repeated and would have been normal.  As such I do not think hypokalemia has significantly contributed to her ventricular tachycardia.        Thank you for allowing me to participate in the care of Tiffani Fiore. Feel free to contact me directly with any further questions or concerns.    Smith Valentin MD  [facility]  12/04/21  10:16 EST    Electronically signed by Smith Valentin MD at 12/04/21 3217

## 2021-12-07 ENCOUNTER — READMISSION MANAGEMENT (OUTPATIENT)
Dept: CALL CENTER | Facility: HOSPITAL | Age: 51
End: 2021-12-07

## 2021-12-07 NOTE — OUTREACH NOTE
Prep Survey      Responses   [facility] facility patient discharged from? [place]   Is LACE score < 7 ? No   Emergency Room discharge w/ pulse ox? No   Eligibility Readm Mgmt   Discharge diagnosis   S/P ICD (internal cardiac defibrillator) procedure   Does the patient have one of the following disease processes/diagnoses(primary or secondary)? Cardiothoracic surgery   Does the patient have Home health ordered? No   Is there a DME ordered? No   Prep survey completed? Yes          Xin Rhoades RN

## 2021-12-08 NOTE — PAYOR COMM NOTE
"Tiffani Lopez (51 y.o. Female)                        Attention;   Dc date 21 case ref [id number]                Date of Birth Social Security Number Address Home Phone MRN    1970  [id number]  [place] [address] [phone] [id number]    Anglican Marital Status             None Single       Admission Date Admission Type Admitting Provider Attending Provider Department, Room/Bed    12/3/21 Emergency Ray Oneill [address]     Discharge Date Discharge Disposition Discharge Destination          2021 Home or Self Care              Attending Provider: (none)   Allergies: No Known Allergies    Isolation: None   Infection: None   Code Status: Prior   Advance Care Planning Activity    Ht: 165 cm (64.96\")   Wt: 90 kg (198 lb 6.6 oz)    Admission Cmt: None   Principal Problem: ICD (implantable cardioverter-defibrillator) discharge [Z45.02]                 Active Insurance as of 12/3/2021     Primary Coverage     Payor Plan Insurance Group Employer/Plan Group    [employer] [facility] Values of n [employer] [facility] PPO [id number]     Payor Plan Address Payor Plan Phone Number Payor Plan Fax Number Effective Dates    [address] [phone]  2021 - None Entered    [place] [address]       Subscriber Name Subscriber Birth Date Member ID       TIFFANI LOPEZ 1970 [id number]                 Emergency Contacts      (Rel.) Home Phone Work Phone Mobile Phone    Chidi Morrell (Significant Other) [phone] -- [phone]               Discharge Summary      Maci Menchaca, WILD at 21 1549     Attestation signed by Grayson Yancey MD at 21 174    I have reviewed this documentation and agree.                                                                                                                          DISCHARGE NOTE    Patient Name: Tiffani Lopez  Age/Sex: 51 y.o. female  : 1970  MRN: " [id number]    Date of Discharge:  12/6/2021   Date of Admit: 12/3/2021  Encounter Provider: WILD Lyle  [facility] of Service[facility] CARD[facility]OLOGY  Patient Care Team:  Provider, No Known as PCP - General  Provider, No Known as PCP - Family Medicine    Subjective:     Discharge Diagnosis:    ICD (implantable cardioverter-defibrillator) discharge    Cardiomyopathy, nonischemic (HCC)    S/P ICD (internal cardiac defibrillator) procedure    Ventricular tachycardia (HCC)    Hypokalemia    Hypomagnesemia    Hypertension    AICD discharge      Hospital Course:     51 yr old patient followed by Dr. Yancey for NICM, s/p ICD, PAT and VT---VT ablation by Dr. Yancey in 9/2017, recurrence, ablation by  Dr. Leyv 2018--.     Recurrent VT 2/2020 w/ICD shock---monitored, has had some non-sustained runs of checks but no therapies until 12/3.     Was at work, felt HR go up, the next thing she knew she was looking up at the ceiling---coworkers did CPR on her briefly because they said she turned blue. She was unaware of ICD shocking her but in ED was interrogated and confirmed that she had VT appropriately treated by ICD.     Her K+ and Mag were low on admission but after evaluation---probably inaccurate, electrolytes have been stable.     She was started on amiodarone and loaded orally (she has been on it in the past---short term)---she has had no recurrence since admission and is stable for dc home.     Echo this admission unchanged.     Will plan on amiodarone 200 mg BID until seen in office and we will also send out some gene testing on her.     She may need a repeat ablation.     Follow up in the office in 3-4 weeks.   Vital Signs  Temp:  [98.3 °F (36.8 °C)-98.9 °F (37.2 °C)] 98.3 °F (36.8 °C)  Heart Rate:  [70-80] 80  Resp:  [16-18] 16  BP: ()/(48-62) 93/53    Intake/Output Summary (Last 24 hours) at 12/6/2021 1602  Last data filed at 12/6/2021 1136  Gross per 24 hour   Intake 720 ml    Output --   Net 720 ml       Physical Exam:    General Appearance: No acute distress, well developed and well nourished.   Eyes: Conjunctiva and lids: No erythema, swelling, or discharge. Sclera non-icteric.   HENT: Atraumatic, normocephalic. External eyes, ears, and nose normal.   Respiratory: No signs of respiratory distress. Respiration rhythm and depth normal.   Clear to auscultation. No rales, crackles, rhonchi, or wheezing auscultated.   Cardiovascular:  Heart Rate and Rhythm: Normal, Heart Sounds: Normal S1 and S2. No S3 or S4 noted.  Murmurs: No murmurs noted. No rubs, thrills, or gallops.   Arterial Pulses: Posterior tibialis and dorsalis pedis pulses normal.   Lower Extremities: No edema noted.  Gastrointestinal:  Abdomen soft, non-distended, non-tender.  Musculoskeletal: Normal movement of extremities  Skin: Warm and dry.   Psychiatric: Patient alert and oriented to person, place, and time. Speech and behavior appropriate. Normal mood and affect.    Labs:   Results from last 7 days   Lab Units 12/06/21  1153 12/06/21  0510 12/05/21  2306 12/05/21  1400 12/05/21  0725 12/05/21  0447 12/04/21  1815 12/04/21  1101 12/04/21  0501 12/03/21  1804 12/03/21  1804   SODIUM mmol/L 136 136 138 135* 133* 138 138   < > 137   < > 145   POTASSIUM mmol/L 4.1 4.1 4.0 5.1 4.4 4.2 4.5   < > 5.0   < > 2.6*   CHLORIDE mmol/L 100 102 102 102 101 104 104   < > 102   < > 117*   CO2 mmol/L 24.0 23.1 23.1 24.2 25.3 23.0 24.2   < > 23.7   < > 19.8*   BUN mg/dL 15 13 12 14 12 12 12   < > 10   < > 10   CREATININE mg/dL 0.85 0.75 0.73 0.88 0.87 0.82 0.92   < > 0.59   < > 0.44*   GLUCOSE mg/dL 139* 96 92 110* 127* 99 118*   < > 97   < > 67   CALCIUM mg/dL 9.1 8.6 8.5* 8.8 9.1 8.9 8.9   < > 8.6   < > 5.9*   AST (SGOT) U/L  --   --   --   --   --   --   --   --  20  --  19   ALT (SGPT) U/L  --   --   --   --   --   --   --   --  21  --  16    < > = values in this interval not displayed.     Results from last 7 days   Lab Units  12/04/21  1101 12/04/21  0501 12/03/21  1804   TROPONIN T ng/mL 0.086* 0.118* 0.013     Results from last 7 days   Lab Units 12/04/21  0501 12/03/21  1804   WBC 10*3/mm3 10.53 11.52*   HEMOGLOBIN g/dL 12.4 12.5   HEMATOCRIT % 36.2 35.9   PLATELETS 10*3/mm3 202 197         Results from last 7 days   Lab Units 12/06/21  0510 12/04/21  0501 12/03/21  1804   MAGNESIUM mg/dL 2.0 2.4 1.5*             Results from last 7 days   Lab Units 12/06/21  1153   TSH uIU/mL 1.270       Discharge Diet:    Dietary Orders (From admission, onward)     Start     Ordered    12/04/21 1048  Diet Regular  Diet Effective Now        Question:  Diet Texture / Consistency  Answer:  Regular    12/04/21 1048                  Activity at Discharge:   Activity Instructions    Activity as tolerated          We did instruct her not to drive until we see her back in the office in 3-4 weeks.     Discharge Medications     Discharge Medications      New Medications      Instructions Start Date   amiodarone 200 MG tablet  Commonly known as: PACERONE   200 mg, Oral, 2 Times Daily With Meals, Until seen in the office in 3-4 weeks         Continue These Medications      Instructions Start Date   carvedilol 6.25 MG tablet  Commonly known as: COREG   TAKE TWO TABLETS BY MOUTH EVERY MORNING      lisinopril 20 MG tablet  Commonly known as: PRINIVIL,ZESTRIL   TAKE ONE TABLET BY MOUTH DAILY      spironolactone 25 MG tablet  Commonly known as: ALDACTONE   25 mg, Oral, Daily             Discharge disposition: home    Follow-up Appointments   Follow-up Information     Maci Menchaca APRN Follow up in 3 week(s).    Specialty: Cardiology  Why: 3-4 weeks   Contact information:  Chanda MERCHANT [address] [address] [phone]             Provider, No Known .    Contact information:  [facility] [address]                       Future Appointments   Date Time Provider Department Center   5/9/2022  8:30 AM WILDER IN OFFICE, [facility] CD  [facility]   5/9/2022  9:20 AM Colleen Prieto MD [facility]         Maci Menchaca, WILD  12/06/21  16:02 EST      Electronically signed by Colleen Prieto MD at 12/06/21 1744       Discharge Order (From admission, onward)     Start     Ordered    12/06/21 1427  Discharge patient  Once        Expected Discharge Date: 12/06/21    Discharge Disposition: Home or Self Care    Physician of Record for Attribution - Please select from Treatment Team: COLLEEN PRIETO [[id number]]    Review needed by CMO to determine Physician of Record: No       Question Answer Comment   Physician of Record for Attribution - Please select from Treatment Team COLLEEN PRIETO    Review needed by CMO to determine Physician of Record No        12/06/21 1426

## 2021-12-10 ENCOUNTER — HOSPITAL ENCOUNTER (OUTPATIENT)
Dept: CARDIOLOGY | Facility: HOSPITAL | Age: 51
Discharge: HOME | End: 2021-12-10
Admitting: INTERNAL MEDICINE

## 2021-12-10 PROCEDURE — 36415 COLL VENOUS BLD VENIPUNCTURE: CPT

## 2021-12-13 ENCOUNTER — READMISSION MANAGEMENT (OUTPATIENT)
Dept: CALL CENTER | Facility: HOSPITAL | Age: 51
End: 2021-12-13

## 2021-12-13 NOTE — OUTREACH NOTE
CT Surgery Week 1 Survey      Responses   [facility] patient discharged from? [place]   Does the patient have one of the following disease processes/diagnoses(primary or secondary)? Cardiothoracic surgery   Week 1 attempt successful? No   Unsuccessful attempts Attempt 1            Alize Carpenter RN

## 2021-12-15 ENCOUNTER — READMISSION MANAGEMENT (OUTPATIENT)
Dept: CALL CENTER | Facility: HOSPITAL | Age: 51
End: 2021-12-15

## 2021-12-15 NOTE — OUTREACH NOTE
CT Surgery Week 1 Survey      Responses   [facility] patient discharged from? [place]   Does the patient have one of the following disease processes/diagnoses(primary or secondary)? Cardiothoracic surgery   Week 1 attempt successful? No   Unsuccessful attempts Attempt 2            Maru Mo RN

## 2021-12-21 ENCOUNTER — READMISSION MANAGEMENT (OUTPATIENT)
Dept: CALL CENTER | Facility: HOSPITAL | Age: 51
End: 2021-12-21

## 2021-12-21 NOTE — OUTREACH NOTE
CT Surgery Week 3 Survey      Responses   [facility] patient discharged from? [place]   Does the patient have one of the following disease processes/diagnoses(primary or secondary)? Cardiothoracic surgery   Week 3 attempt successful? No   Unsuccessful attempts Attempt 1          Deb Burrell RN

## 2021-12-22 ENCOUNTER — READMISSION MANAGEMENT (OUTPATIENT)
Dept: CALL CENTER | Facility: HOSPITAL | Age: 51
End: 2021-12-22

## 2021-12-22 NOTE — OUTREACH NOTE
CT Surgery Week 3 Survey      Responses   [facility] patient discharged from? [place]   Does the patient have one of the following disease processes/diagnoses(primary or secondary)? Cardiothoracic surgery   Week 3 attempt successful? No   Unsuccessful attempts Attempt 2          Shawna Rodriguez RN

## 2022-01-13 ENCOUNTER — CLINICAL SUPPORT NO REQUIREMENTS (OUTPATIENT)
Dept: CARDIOLOGY | Facility: CLINIC | Age: 52
End: 2022-01-13

## 2022-01-13 ENCOUNTER — OFFICE VISIT (OUTPATIENT)
Dept: CARDIOLOGY | Facility: CLINIC | Age: 52
End: 2022-01-13

## 2022-01-13 VITALS
HEIGHT: 65 IN | DIASTOLIC BLOOD PRESSURE: 68 MMHG | WEIGHT: 188 LBS | BODY MASS INDEX: 31.32 KG/M2 | HEART RATE: 69 BPM | SYSTOLIC BLOOD PRESSURE: 88 MMHG

## 2022-01-13 DIAGNOSIS — I42.8 CARDIOMYOPATHY, NONISCHEMIC: ICD-10-CM

## 2022-01-13 DIAGNOSIS — Z15.89 GENE MUTATION: ICD-10-CM

## 2022-01-13 DIAGNOSIS — I42.8 NICM (NONISCHEMIC CARDIOMYOPATHY): Primary | ICD-10-CM

## 2022-01-13 DIAGNOSIS — I47.20 VENTRICULAR TACHYCARDIA: ICD-10-CM

## 2022-01-13 DIAGNOSIS — I10 ESSENTIAL HYPERTENSION: ICD-10-CM

## 2022-01-13 DIAGNOSIS — Z98.890 H/O PRIOR ABLATION TREATMENT: ICD-10-CM

## 2022-01-13 DIAGNOSIS — Z95.810 ICD (IMPLANTABLE CARDIOVERTER-DEFIBRILLATOR), DUAL, IN SITU: ICD-10-CM

## 2022-01-13 PROBLEM — Z45.02 AICD DISCHARGE: Status: RESOLVED | Noted: 2021-12-03 | Resolved: 2022-01-13

## 2022-01-13 PROCEDURE — 93000 ELECTROCARDIOGRAM COMPLETE: CPT | Performed by: NURSE PRACTITIONER

## 2022-01-13 PROCEDURE — 99214 OFFICE O/P EST MOD 30 MIN: CPT | Performed by: NURSE PRACTITIONER

## 2022-01-13 PROCEDURE — 93282 PRGRMG EVAL IMPLANTABLE DFB: CPT | Performed by: INTERNAL MEDICINE

## 2022-01-13 RX ORDER — LISINOPRIL 20 MG/1
10 TABLET ORAL DAILY
Qty: 90 TABLET | Refills: 1
Start: 2022-01-13 | End: 2022-04-15 | Stop reason: SDUPTHER

## 2022-01-13 RX ORDER — CARVEDILOL 6.25 MG/1
6.25 TABLET ORAL 2 TIMES DAILY WITH MEALS
Qty: 180 TABLET | Refills: 1
Start: 2022-01-13 | End: 2022-04-15 | Stop reason: SDUPTHER

## 2022-01-13 NOTE — PROGRESS NOTES
Date of Office Visit: 2022  Encounter Provider: WILD Lyle  Place of Service: [facility] CARDIOLOGY  Patient Name: Tiffani Fiore  :1970    Chief Complaint   Patient presents with   • NICM     1 month BHE f/u   • VT   • Hypertension   • ICD check   :     HPI: Tiffani Fiore is a 51 y.o. female who follows with Dr. Yancey for nonischemic cardiomyopathy, VT, status post VT ablation--UC,  in .  Recurrent VT 2020--- ICD shock, we monitored at that time, she has had some short nonsustained runs but no therapies.    Presented to the hospital in early December with VT and ICD shocks.  We loaded her with oral amiodarone and she presents today for follow-up.    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     Presents today for follow up.     She is doing well pretty.  She had not had any further episodes. No chest pain, dyspnea, PND, orthopnea, dizziness, edema or palpitations.     She is having trouble with low blood pressure at times and has some dizziness/doesn't feel well when this happens.     Device interrogation shows normal testing and function. No arrhythmia episodes.         Past Medical History:   Diagnosis Date   • Cardiomyopathy ([facility])    • Chest pain    • CHF (congestive heart failure) ([facility])    • Clostridium difficile colitis    • Depression    • DVT (deep venous thrombosis) ([facility])    • Dyspnea    • Fatigue    • History of paroxysmal atrial tachycardia    • History of transfusion    • Hyperlipidemia    • Hypertension    • Hypokalemia    • Hypomagnesemia    • ICD (implantable cardioverter-defibrillator) discharge    • ICD (implantable cardioverter-defibrillator) in place     LEFT SUBCLAVIAN AREA   • Ischemic cardiomyopathy    • Migraine    • Myocardial infarction ([facility])    • Obesity due to  excess calories    • Overweight (BMI 25.0-29.9)    • Palpitations     ICD   • PONV (postoperative nausea and vomiting)    • Pulmonary embolism (HCC)    • S/P ICD (internal cardiac defibrillator) procedure    • SVT (supraventricular tachycardia) (HCC)    • Tachycardia    • VT (ventricular tachycardia) (HCC)     recurrent       Past Surgical History:   Procedure Laterality Date   • ABLATION OF DYSRHYTHMIC FOCUS  2018    Multiple VT ablations, last on at -Dr Levy, epicardial VT 2018   • BRONCHOSCOPY N/A 3/8/2018    Procedure: BRONCHOSCOPY WITH BAL;  Surgeon: Berto Nicholson MD;  Location: [facility] ENDOSCOPY;  Service:    • CARDIAC CATHETERIZATION N/A 2017    Procedure: Left Heart Cath;  Surgeon: Saúl Ann MD;  Location: [facility] INVASIVE LOCATION;  Service:    • CARDIAC CATHETERIZATION N/A 2017    Procedure: Left ventriculography;  Surgeon: Saúl Ann MD;  Location: [facility] INVASIVE LOCATION;  Service:    • CARDIAC CATHETERIZATION N/A 2017    Procedure: Coronary angiography;  Surgeon: Saúl Ann MD;  Location: [facility] INVASIVE LOCATION;  Service:    • CARDIAC DEFIBRILLATOR PLACEMENT     • CARDIAC DEFIBRILLATOR PLACEMENT      CONNER DID LEAD REVISION WITH REPLACEMENT   • CARDIAC DEFIBRILLATOR PLACEMENT     • CARDIAC DEFIBRILLATOR PLACEMENT     • CARDIAC ELECTROPHYSIOLOGY PROCEDURE N/A 2017    Procedure: Ablation VT  PATIENT HAS [place] ICD;  Surgeon: Grayson Yancey MD;  Location: [facility] INVASIVE LOCATION;  Service:    •  SECTION     • HYSTERECTOMY     • IMPLANTABLE CARDIOVERTER DEFIBRILLATOR LEAD REPLACEMENT/POCKET REVISION N/A 10/20/2017    Procedure: ICD LEAD EXTRACTION TRANSVENOUS AND PLACEMENT OF RV AND LV LEAD;  Surgeon: Amrit Salgado MD;  Location: [facility] OR ;  Service:    • TUBAL ABDOMINAL LIGATION         Social History     Socioeconomic History   • Marital status: Single   Tobacco Use   • Smoking status: Former  "Smoker     Packs/day: 0.50     Years: 15.00     Pack years: 7.50     Types: Cigarettes     Quit date: 2012     Years since quitting: 10.0   • Smokeless tobacco: Former User   • Tobacco comment: quit age 42   Vaping Use   • Vaping Use: Never used   Substance and Sexual Activity   • Alcohol use: No   • Drug use: No   • Sexual activity: Defer       Family History   Problem Relation Age of Onset   • Diabetes Father    • Cancer Father         lung   • COPD Mother    • Malig Hyperthermia Neg Hx        Review of Systems   Constitutional: Negative for chills, fever and malaise/fatigue.   Cardiovascular: Negative for chest pain, dyspnea on exertion, leg swelling, near-syncope, orthopnea, palpitations, paroxysmal nocturnal dyspnea and syncope.   Respiratory: Negative for cough and shortness of breath.    Musculoskeletal: Negative for joint pain, joint swelling and myalgias.   Gastrointestinal: Negative for abdominal pain, diarrhea, melena, nausea and vomiting.   Genitourinary: Negative for frequency and hematuria.   Neurological: Negative for light-headedness, numbness, paresthesias and seizures.   Allergic/Immunologic: Negative.    All other systems reviewed and are negative.      No Known Allergies      Current Outpatient Medications:   •  amiodarone (PACERONE) 200 MG tablet, Take 1 tablet by mouth 2 (Two) Times a Day With Meals. Until seen in the office in 3-4 weeks, Disp: 60 tablet, Rfl: 5  •  carvedilol (COREG) 6.25 MG tablet, TAKE TWO TABLETS BY MOUTH EVERY MORNING, Disp: 180 tablet, Rfl: 1  •  lisinopril (PRINIVIL,ZESTRIL) 20 MG tablet, TAKE ONE TABLET BY MOUTH DAILY, Disp: 90 tablet, Rfl: 1  •  spironolactone (ALDACTONE) 25 MG tablet, Take 1 tablet by mouth Daily., Disp: 30 tablet, Rfl: 11      Objective:     Vitals:    01/13/22 0903   BP: (!) 88/68   Pulse: 69   Weight: 85.3 kg (188 lb)   Height: 165.1 cm (65\")     Body mass index is 31.28 kg/m².    PHYSICAL EXAM:    Vitals Reviewed.   General Appearance: No acute " distress, well developed and well nourished.   Eyes: Conjunctiva and lids: No erythema, swelling, or discharge. Sclera non-icteric.   HENT: Atraumatic, normocephalic. External eyes, ears, and nose normal.   Respiratory: No signs of respiratory distress. Respiration rhythm and depth normal.   Clear to auscultation. No rales, crackles, rhonchi, or wheezing auscultated.   Cardiovascular:  Heart Rate and Rhythm: Normal, Heart Sounds: Normal S1 and S2. No S3 or S4 noted.  Murmurs: No murmurs noted. No rubs, thrills, or gallops.   Lower Extremities: No edema noted.  Gastrointestinal:  Abdomen soft, non-distended, non-tender.   Musculoskeletal: Normal movement of extremities  Skin and Nails: General appearance normal. No pallor, cyanosis, diaphoresis. Skin temperature normal. No clubbing of fingernails.   Psychiatric: Patient alert and oriented to person, place, and time. Speech and behavior appropriate. Normal mood and affect.       ECG 12 Lead    Date/Time: 1/13/2022 9:38 AM  Performed by: Maci Menchaca APRN  Authorized by: Maci Menchaca APRN   Comparison: compared with previous ECG   Similar to previous ECG  Rhythm: sinus rhythm  BPM: 69  Conduction: left bundle branch block              Assessment:       Diagnosis Plan   1. Cardiomyopathy, nonischemic (HCC)     2. ICD (implantable cardioverter-defibrillator), dual, in situ  ECG 12 Lead   3. Ventricular tachycardia (HCC)  ECG 12 Lead   4. H/O prior ablation treatment--VT ablation 2018--Dr. Levy/     5. 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 Essential hypertension            Plan:       1.-5. NICM, s/p ICD, VT, s/p VT ablation 2018>>recurrent VT, recent VT/ICD shocks (12/3)--loaded with amiodarone, no episodes since hospital discharge. Currently on amiodarone 200 mg BID. Gene testing positive for DSP gene---I do not know the details on this, Dr. Yancey just received results yesterday and was discussing with Dr. Levy---will call her with further  information and recommendations.     6. HTN---b/p actually running low. She has gotten systolic pressures in the 90's and she does not feel well at those times but 88/68 today in the office and currently feels okay.     Decreasing lisinopril to 10 mg nightly, she was taking her carvedilol 6.25mg (2 tabs) daily---instructed to switch that to BID and monitor, may need to decrease further. Instructed that she could check b/p prior to taking meds and hold dose if systolic < 100.     Will call her with further information and recommendations. I think we can decrease amiodarone to 200 mg daily but will discuss with Dr. Yancey and call her.    She has follow up scheduled in May with device check.     As always, it has been a pleasure to participate in your patient's care.      Sincerely,         WILD Steele

## 2022-01-17 ENCOUNTER — TELEPHONE (OUTPATIENT)
Dept: CARDIOLOGY | Facility: CLINIC | Age: 52
End: 2022-01-17

## 2022-01-17 RX ORDER — AMIODARONE HYDROCHLORIDE 200 MG/1
200 TABLET ORAL DAILY
Qty: 60 TABLET | Refills: 5
Start: 2022-01-17 | End: 2022-09-30

## 2022-01-17 NOTE — TELEPHONE ENCOUNTER
Call her and have her decrease the amiodarone to 200 mg daily.     Let her know that Dr. Yancey is still in conversation with Dr. Levy about the genetic test results and will call her with more info when we have it.

## 2022-01-19 ENCOUNTER — TELEPHONE (OUTPATIENT)
Dept: CARDIOLOGY | Facility: CLINIC | Age: 52
End: 2022-01-19

## 2022-01-19 NOTE — TELEPHONE ENCOUNTER
Dr. Yancey and I spoke with her regarding positive gene testing results and that her first degree relatives need to be tested for the gene she has. She has 2 children and brother--children are in [place] and brother is in [place]---she is going to talk with them, we told her we can probably help them get set up with an EP in their area to get tested.

## 2022-02-08 PROCEDURE — 93295 DEV INTERROG REMOTE 1/2/MLT: CPT | Performed by: INTERNAL MEDICINE

## 2022-02-08 PROCEDURE — 93296 REM INTERROG EVL PM/IDS: CPT | Performed by: INTERNAL MEDICINE

## 2022-03-24 ENCOUNTER — TELEPHONE (OUTPATIENT)
Dept: CARDIOLOGY | Facility: CLINIC | Age: 52
End: 2022-03-24

## 2022-03-24 NOTE — TELEPHONE ENCOUNTER
Review remote transmission. No events noted. Presenting is ASBIVS 70s.    There is an alert for LV impedence out of range at 837 ohms on 3/21/22. LV and sock impedances both have trended down over last year.     Please see note below of how pt felt like she was having VT earlier today.

## 2022-03-24 NOTE — TELEPHONE ENCOUNTER
PT called Complaining of having VT at work today while walking in to her job. Could feel palpitations but has since settled down. She is not near home monitor but I instructed her to send a manual transmission once she is home. If symptoms worsen to call us back or seek care at ER. Will eval strips and send to Dr Yancey once received. PT wondering if she should up her meds.

## 2022-03-24 NOTE — TELEPHONE ENCOUNTER
Patient called stating she is having trouble sending remote and asking for someone to call to assist.    She can be reached at [phone].

## 2022-04-15 RX ORDER — LISINOPRIL 20 MG/1
10 TABLET ORAL DAILY
Qty: 90 TABLET | Refills: 0 | Status: SHIPPED | OUTPATIENT
Start: 2022-04-15 | End: 2022-09-30 | Stop reason: SDUPTHER

## 2022-04-15 RX ORDER — CARVEDILOL 6.25 MG/1
6.25 TABLET ORAL 2 TIMES DAILY WITH MEALS
Qty: 180 TABLET | Refills: 0 | Status: SHIPPED | OUTPATIENT
Start: 2022-04-15 | End: 2022-07-06 | Stop reason: SDUPTHER

## 2022-04-29 NOTE — ANESTHESIA PROCEDURE NOTES
Airway  Urgency: elective    Date/Time: 3/8/2018 9:37 AM  Airway not difficult    General Information and Staff    Patient location during procedure: OR  Anesthesiologist: COLLEEN ALTMAN  CRNA: STANLEY ARNDT    Indications and Patient Condition  Indications for airway management: airway protection    Preoxygenated: yes  MILS maintained throughout  Mask difficulty assessment: 1 - vent by mask    Final Airway Details  Final airway type: supraglottic airway      Successful airway: unique  Size 3    Number of attempts at approach: 1              
Fall with Harm Risk

## 2022-05-09 ENCOUNTER — OFFICE VISIT (OUTPATIENT)
Dept: CARDIOLOGY | Facility: CLINIC | Age: 52
End: 2022-05-09

## 2022-05-09 ENCOUNTER — CLINICAL SUPPORT NO REQUIREMENTS (OUTPATIENT)
Dept: CARDIOLOGY | Facility: CLINIC | Age: 52
End: 2022-05-09

## 2022-05-09 ENCOUNTER — TELEPHONE (OUTPATIENT)
Dept: CARDIOLOGY | Facility: CLINIC | Age: 52
End: 2022-05-09

## 2022-05-09 ENCOUNTER — HOSPITAL ENCOUNTER (OUTPATIENT)
Dept: GENERAL RADIOLOGY | Facility: HOSPITAL | Age: 52
Discharge: HOME | End: 2022-05-09

## 2022-05-09 ENCOUNTER — LAB (OUTPATIENT)
Dept: LAB | Facility: HOSPITAL | Age: 52
End: 2022-05-09

## 2022-05-09 VITALS
BODY MASS INDEX: 29.99 KG/M2 | SYSTOLIC BLOOD PRESSURE: 124 MMHG | HEART RATE: 61 BPM | WEIGHT: 180 LBS | HEIGHT: 65 IN | DIASTOLIC BLOOD PRESSURE: 74 MMHG

## 2022-05-09 DIAGNOSIS — I42.8 CARDIOMYOPATHY, NONISCHEMIC: Primary | ICD-10-CM

## 2022-05-09 DIAGNOSIS — I47.20 VENTRICULAR TACHYCARDIA: ICD-10-CM

## 2022-05-09 DIAGNOSIS — Z79.899 ON AMIODARONE THERAPY: ICD-10-CM

## 2022-05-09 DIAGNOSIS — Z95.810 ICD (IMPLANTABLE CARDIOVERTER-DEFIBRILLATOR), DUAL, IN SITU: ICD-10-CM

## 2022-05-09 LAB
ALBUMIN SERPL-MCNC: 4.4 G/DL (ref 3.5–5.2)
ALBUMIN/GLOB SERPL: 1.9 G/DL
ALP SERPL-CCNC: 98 U/L (ref 39–117)
ALT SERPL W P-5'-P-CCNC: 12 U/L (ref 1–33)
ANION GAP SERPL CALCULATED.3IONS-SCNC: 9 MMOL/L (ref 5–15)
AST SERPL-CCNC: 16 U/L (ref 1–32)
BILIRUB SERPL-MCNC: 0.3 MG/DL (ref 0–1.2)
BUN SERPL-MCNC: 16 MG/DL (ref 6–20)
BUN/CREAT SERPL: 19.5 (ref 7–25)
CALCIUM SPEC-SCNC: 9 MG/DL (ref 8.6–10.5)
CHLORIDE SERPL-SCNC: 103 MMOL/L (ref 98–107)
CO2 SERPL-SCNC: 25 MMOL/L (ref 22–29)
CREAT SERPL-MCNC: 0.82 MG/DL (ref 0.57–1)
EGFRCR SERPLBLD CKD-EPI 2021: 86.7 ML/MIN/1.73
GLOBULIN UR ELPH-MCNC: 2.3 GM/DL
GLUCOSE SERPL-MCNC: 95 MG/DL (ref 65–99)
POTASSIUM SERPL-SCNC: 4.6 MMOL/L (ref 3.5–5.2)
PROT SERPL-MCNC: 6.7 G/DL (ref 6–8.5)
SODIUM SERPL-SCNC: 137 MMOL/L (ref 136–145)
T4 FREE SERPL-MCNC: 1.67 NG/DL (ref 0.93–1.7)

## 2022-05-09 PROCEDURE — 80151 DRUG ASSAY AMIODARONE: CPT

## 2022-05-09 PROCEDURE — 93000 ELECTROCARDIOGRAM COMPLETE: CPT | Performed by: INTERNAL MEDICINE

## 2022-05-09 PROCEDURE — 93282 PRGRMG EVAL IMPLANTABLE DFB: CPT | Performed by: INTERNAL MEDICINE

## 2022-05-09 PROCEDURE — 36415 COLL VENOUS BLD VENIPUNCTURE: CPT

## 2022-05-09 PROCEDURE — 71046 X-RAY EXAM CHEST 2 VIEWS: CPT

## 2022-05-09 PROCEDURE — 99214 OFFICE O/P EST MOD 30 MIN: CPT | Performed by: INTERNAL MEDICINE

## 2022-05-09 PROCEDURE — 84439 ASSAY OF FREE THYROXINE: CPT

## 2022-05-09 PROCEDURE — 80053 COMPREHEN METABOLIC PANEL: CPT

## 2022-05-09 NOTE — TELEPHONE ENCOUNTER
----- Message from Grayson Yancey MD sent at 5/9/2022  1:46 PM EDT -----  Can you let her know that her labs and chest x-ray are okay.

## 2022-05-09 NOTE — PROGRESS NOTES
Date of Office Visit: 2022  Encounter Provider: Grayson Yancey MD  Place of Service: [facility] CARDIOLOGY  Patient Name: Tiffani Fiore  : 1970    Subjective:     Encounter Date:2022      Patient ID: Tiffani Fiore is a 51 y.o. female who has a cc of  NICM and had multiple ablations in the past -- the last one by Jakob Levy in [place] in  and she had an epicardia source.     Ut and paste from VS from  -- Recurrent VT 2020--- ICD shock, we monitored at that time, she has had some short nonsustained runs but no therapies.     Presented to the hospital in early December with VT and ICD shocks.  We loaded her with oral amiodarone and she presents today for follow-up.     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     She has also had lead extractions in . She has an lV lead and it is not in a posterior vein.      Did well until recently this winter and had recurrent VT in Dec. SHe had syncope and VT.     She had an episode of tachy at 150 bpm for 21 minutes. And it spontaneously resolved.     She has no side effects from amiodarone.         There have been no hospital admission since the last visit.     There have been no bleeding events.       Past Medical History:   Diagnosis Date   • Cardiomyopathy ([facility])    • Chest pain    • CHF (congestive heart failure) ([facility])    • Clostridium difficile colitis    • Depression    • DVT (deep venous thrombosis) ([facility])    • Dyspnea    • Fatigue    • History of paroxysmal atrial tachycardia    • History of transfusion    • Hyperlipidemia    • Hypertension    • Hypokalemia    • Hypomagnesemia    • ICD (implantable cardioverter-defibrillator) discharge    • ICD (implantable cardioverter-defibrillator) in place     LEFT SUBCLAVIAN AREA   • Ischemic cardiomyopathy    • Migraine    •  "Myocardial infarction (HCC) 2012   • Obesity due to excess calories    • Overweight (BMI 25.0-29.9)    • Palpitations     ICD   • PONV (postoperative nausea and vomiting)    • Pulmonary embolism ([facility])    • S/P ICD (internal cardiac defibrillator) procedure    • SVT (supraventricular tachycardia) ([facility])    • Tachycardia    • VT (ventricular tachycardia) ([facility])     recurrent       Social History     Socioeconomic History   • Marital status: Single   Tobacco Use   • Smoking status: Former Smoker     Packs/day: 0.50     Years: 15.00     Pack years: 7.50     Types: Cigarettes     Quit date: 2012     Years since quitting: 10.3   • Smokeless tobacco: Former User   • Tobacco comment: quit age 42   Vaping Use   • Vaping Use: Never used   Substance and Sexual Activity   • Alcohol use: No   • Drug use: No   • Sexual activity: Defer       Family History   Problem Relation Age of Onset   • Diabetes Father    • Cancer Father         lung   • COPD Mother    • Malig Hyperthermia Neg Hx        Review of Systems   Constitutional: Negative for fever and night sweats.   HENT: Negative for ear pain and stridor.    Eyes: Negative for discharge and visual halos.   Cardiovascular: Negative for cyanosis.   Respiratory: Negative for hemoptysis and sputum production.    Hematologic/Lymphatic: Negative for adenopathy.   Skin: Negative for nail changes and unusual hair distribution.   Musculoskeletal: Negative for gout and joint swelling.   Gastrointestinal: Negative for bowel incontinence and flatus.   Genitourinary: Negative for dysuria and flank pain.   Neurological: Negative for seizures and tremors.   Psychiatric/Behavioral: Negative for altered mental status. The patient is not nervous/anxious.             Objective:     Vitals:    05/09/22 0841   BP: 124/74   Pulse: 61   Weight: 81.6 kg (180 lb)   Height: 165.1 cm (65\")         Eyes:      General:         Right eye: No discharge.         Left eye: No discharge.   HENT:      Head: " Normocephalic and atraumatic.   Neck:      Thyroid: No thyromegaly.      Vascular: No JVD.   Pulmonary:      Effort: Pulmonary effort is normal.      Breath sounds: Normal breath sounds. No rales.   Cardiovascular:      Normal rate. Regular rhythm.      No gallop.   Edema:     Peripheral edema absent.   Abdominal:      General: Bowel sounds are normal.      Palpations: Abdomen is soft.      Tenderness: There is no abdominal tenderness.   Musculoskeletal: Normal range of motion.         General: No deformity. Skin:     General: Skin is warm and dry.      Findings: No erythema.   Neurological:      Mental Status: Alert and oriented to person, place, and time.      Motor: Normal muscle tone.   Psychiatric:         Behavior: Behavior normal.         Thought Content: Thought content normal.           ECG 12 Lead    Date/Time: 5/9/2022 9:36 AM  Performed by: Grayson Yancey MD  Authorized by: Grayson Yancey MD   Comparison: compared with previous ECG   Similar to previous ECG  Rhythm: sinus rhythm  Conduction: left bundle branch block            Lab Review:       Assessment:          Diagnosis Plan   1. Cardiomyopathy, nonischemic (HCC)     2. ICD (implantable cardioverter-defibrillator), dual, in situ     3. Ventricular tachycardia (HCC)            Plan:     She is fine now on amio.,     I programmed a VT monitor zone to 130 BPM to detect any slower VTs.     I will check amio labs     I reviewed the pacemaker/ICD tracings and the pacing and sensing parameters are normal.  AF burden is -     NICM -- on GDMT and Class 1 NYHA

## 2022-05-10 PROCEDURE — 93296 REM INTERROG EVL PM/IDS: CPT | Performed by: INTERNAL MEDICINE

## 2022-05-10 PROCEDURE — 93295 DEV INTERROG REMOTE 1/2/MLT: CPT | Performed by: INTERNAL MEDICINE

## 2022-05-14 LAB
AMIODARONE SERPL-MCNC: 1054 NG/ML (ref 1000–2500)
DESETHYLAMIODARONE SERPL-MCNC: 895 NG/ML

## 2022-05-16 ENCOUNTER — TELEPHONE (OUTPATIENT)
Dept: CARDIOLOGY | Facility: CLINIC | Age: 52
End: 2022-05-16

## 2022-05-16 NOTE — TELEPHONE ENCOUNTER
----- Message from Grayson Yancey MD sent at 5/16/2022  2:23 PM EDT -----  Can you let her know that her amiodarone level is fine

## 2022-05-23 RX ORDER — SPIRONOLACTONE 25 MG/1
25 TABLET ORAL DAILY
Qty: 30 TABLET | Refills: 11 | Status: SHIPPED | OUTPATIENT
Start: 2022-05-23

## 2022-07-06 RX ORDER — CARVEDILOL 6.25 MG/1
6.25 TABLET ORAL 2 TIMES DAILY WITH MEALS
Qty: 180 TABLET | Refills: 0 | Status: SHIPPED | OUTPATIENT
Start: 2022-07-06 | End: 2022-10-28 | Stop reason: SDUPTHER

## 2022-08-25 ENCOUNTER — OFFICE VISIT (OUTPATIENT)
Dept: CARDIOLOGY | Facility: CLINIC | Age: 52
End: 2022-08-25

## 2022-08-25 ENCOUNTER — CLINICAL SUPPORT NO REQUIREMENTS (OUTPATIENT)
Dept: CARDIOLOGY | Facility: CLINIC | Age: 52
End: 2022-08-25

## 2022-08-25 VITALS
SYSTOLIC BLOOD PRESSURE: 156 MMHG | DIASTOLIC BLOOD PRESSURE: 90 MMHG | BODY MASS INDEX: 29.82 KG/M2 | HEART RATE: 62 BPM | WEIGHT: 179 LBS | HEIGHT: 65 IN

## 2022-08-25 DIAGNOSIS — Z95.810 ICD (IMPLANTABLE CARDIOVERTER-DEFIBRILLATOR), DUAL, IN SITU: ICD-10-CM

## 2022-08-25 DIAGNOSIS — Z79.899 ON AMIODARONE THERAPY: ICD-10-CM

## 2022-08-25 DIAGNOSIS — I10 ESSENTIAL HYPERTENSION: ICD-10-CM

## 2022-08-25 DIAGNOSIS — Z98.890 H/O PRIOR ABLATION TREATMENT: ICD-10-CM

## 2022-08-25 DIAGNOSIS — I47.20 VENTRICULAR TACHYCARDIA: ICD-10-CM

## 2022-08-25 DIAGNOSIS — I42.8 CARDIOMYOPATHY, NONISCHEMIC: Primary | ICD-10-CM

## 2022-08-25 DIAGNOSIS — Z15.89 GENE MUTATION: ICD-10-CM

## 2022-08-25 PROBLEM — I42.9 CARDIOMYOPATHY: Status: RESOLVED | Noted: 2017-10-20 | Resolved: 2022-08-25

## 2022-08-25 PROBLEM — I49.9 ARRHYTHMIA: Status: RESOLVED | Noted: 2017-09-27 | Resolved: 2022-08-25

## 2022-08-25 PROCEDURE — 93282 PRGRMG EVAL IMPLANTABLE DFB: CPT | Performed by: INTERNAL MEDICINE

## 2022-08-25 PROCEDURE — 99213 OFFICE O/P EST LOW 20 MIN: CPT | Performed by: NURSE PRACTITIONER

## 2022-08-25 PROCEDURE — 93000 ELECTROCARDIOGRAM COMPLETE: CPT | Performed by: NURSE PRACTITIONER

## 2022-08-25 NOTE — PROGRESS NOTES
Date of Office Visit: 2022  Encounter Provider: WILD Lyle  Place of Service: [facility] CARDIOLOGY  Patient Name: Tiffani Fiore  :1970    Chief Complaint   Patient presents with   • NICM   • VT   • ICD check   :     HPI: Tiffani Fiore is a 52 y.o. female who follows with Dr. Yancey for NICM, VT, s/p multiple ablations, last one --[facility]/Dr. Levy--epicardial source.     VT w/ICD showed 2020--monitored, did okay until 2021>>VT w/ICD shocks>>loaded with amiodarone.     Gene testing revealed heterozygous--pathogenic variant in the DSP gene---likely c/w genetic form of cardiomyopathy.     Presents for follow up.     Doing well. No episodes since Dec---on amiodarone 100 mg daily.     She had an episode of chest pressure/pain about a week ago--resolved and has not recurred, has not had this before--normal cath ---she will monitor and call if recurs.     Otherwise doing well, no dyspnea, PND, orthopnea or edema.    Device check RV paced <0.1%, no ventricular or atrial arrhythmias.     She has had intermittent trouble with low b/p associated w/dizziness---systolic 80's---we dicussed, may try adjusting timing of meds---actually high today--which is infrequent.     Labs and CXR in May were all okay.      Past Medical History:   Diagnosis Date   • Cardiomyopathy ([facility])    • Chest pain    • CHF (congestive heart failure) ([facility])    • Clostridium difficile colitis    • Depression    • DVT (deep venous thrombosis) ([facility])    • Dyspnea    • Fatigue    • Gene mutation     DSP --c. 1273 C>T, p.(R425*)   • H/O prior ablation treatment     VT ablation --Dr. Levy/[facility]   • History of paroxysmal atrial tachycardia    • History of transfusion    • Hyperlipidemia    • Hypertension    • Hypokalemia    • Hypomagnesemia    • ICD (implantable cardioverter-defibrillator) discharge    • ICD (implantable cardioverter-defibrillator) in place     LEFT SUBCLAVIAN AREA    • Ischemic cardiomyopathy    • Migraine    • Myocardial infarction (HCC)    • Obesity due to excess calories    • Overweight (BMI 25.0-29.9)    • Palpitations     ICD   • PONV (postoperative nausea and vomiting)    • Pulmonary embolism (HCC)    • S/P ICD (internal cardiac defibrillator) procedure    • SVT (supraventricular tachycardia) ([facility])    • Tachycardia    • VT (ventricular tachycardia) ([facility])     recurrent       Past Surgical History:   Procedure Laterality Date   • ABLATION OF DYSRHYTHMIC FOCUS  2018    [facility]   • BRONCHOSCOPY N/A 3/8/2018    Procedure: BRONCHOSCOPY WITH BAL;  Surgeon: Berto Nicholson MD;  Location: [facility] ENDOSCOPY;  Service:    • CARDIAC CATHETERIZATION N/A 2017    Procedure: Left Heart Cath;  Surgeon: Saúl Ann MD;  Location: [facility] CATH INVASIVE LOCATION;  Service:    • CARDIAC CATHETERIZATION N/A 2017    Procedure: Left ventriculography;  Surgeon: Saúl Ann MD;  Location: [facility] INVASIVE LOCATION;  Service:    • CARDIAC CATHETERIZATION N/A 2017    Procedure: Coronary angiography;  Surgeon: Saúl Ann MD;  Location: [facility] INVASIVE LOCATION;  Service:    • CARDIAC DEFIBRILLATOR PLACEMENT     • CARDIAC DEFIBRILLATOR PLACEMENT      CONNER DID LEAD REVISION WITH REPLACEMENT   • CARDIAC DEFIBRILLATOR PLACEMENT     • CARDIAC DEFIBRILLATOR PLACEMENT     • CARDIAC ELECTROPHYSIOLOGY PROCEDURE N/A 2017    Procedure: Ablation VT  PATIENT HAS [place]OST[place] ICD;  Surgeon: Grayson Yancey MD;  Location: [facility] INVASIVE LOCATION;  Service:    •  SECTION     • HYSTERECTOMY     • IMPLANTABLE CARDIOVERTER DEFIBRILLATOR LEAD REPLACEMENT/POCKET REVISION N/A 10/20/2017    Procedure: ICD LEAD EXTRACTION TRANSVENOUS AND PLACEMENT OF RV AND LV LEAD;  Surgeon: Amrit Salgado MD;  [facility]  Service:    • TUBAL ABDOMINAL LIGATION         Social History      Socioeconomic History   • Marital status: Single   Tobacco Use   • Smoking status: Former Smoker     Packs/day: 0.50     Years: 15.00     Pack years: 7.50     Types: Cigarettes     Quit date: 2012     Years since quitting: 10.6   • Smokeless tobacco: Former User   • Tobacco comment: quit age 42   Vaping Use   • Vaping Use: Never used   Substance and Sexual Activity   • Alcohol use: No   • Drug use: No   • Sexual activity: Defer       Family History   Problem Relation Age of Onset   • Diabetes Father    • Cancer Father         lung   • COPD Mother    • Malig Hyperthermia Neg Hx        Review of Systems   Constitutional: Negative for chills, fever and malaise/fatigue.   Cardiovascular: Positive for chest pain. Negative for dyspnea on exertion, leg swelling, near-syncope, orthopnea, palpitations, paroxysmal nocturnal dyspnea and syncope.   Respiratory: Negative for cough and shortness of breath.    Musculoskeletal: Negative for joint pain, joint swelling and myalgias.   Gastrointestinal: Negative for abdominal pain, diarrhea, melena, nausea and vomiting.   Genitourinary: Negative for frequency and hematuria.   Neurological: Positive for dizziness (intermittent associated w/low b/p). Negative for light-headedness, numbness, paresthesias and seizures.   Allergic/Immunologic: Negative.    All other systems reviewed and are negative.      No Known Allergies      Current Outpatient Medications:   •  amiodarone (PACERONE) 200 MG tablet, Take 1 tablet by mouth Daily. Until seen in the office in 3-4 weeks (Patient taking differently: Take 100 mg by mouth Daily. Until seen in the office in 3-4 weeks), Disp: 60 tablet, Rfl: 5  •  carvedilol (COREG) 6.25 MG tablet, Take 1 tablet by mouth 2 (Two) Times a Day With Meals., Disp: 180 tablet, Rfl: 0  •  lisinopril (PRINIVIL,ZESTRIL) 20 MG tablet, Take 0.5 tablets by mouth Daily., Disp: 90 tablet, Rfl: 0  •  spironolactone (ALDACTONE) 25 MG tablet, Take 1 tablet by mouth Daily.,  "Disp: 30 tablet, Rfl: 11      Objective:     Vitals:    08/25/22 1053   BP: 156/90   Pulse: 62   Weight: 81.2 kg (179 lb)   Height: 165.1 cm (65\")     Body mass index is 29.79 kg/m².    PHYSICAL EXAM:    Vitals Reviewed.   General Appearance: No acute distress, well developed and well nourished.   Eyes: Conjunctiva and lids: No erythema, swelling, or discharge. Sclera non-icteric.   HENT: Atraumatic, normocephalic. External eyes, ears, and nose normal.   Respiratory: No signs of respiratory distress. Respiration rhythm and depth normal.   Clear to auscultation. No rales, crackles, rhonchi, or wheezing auscultated.   Cardiovascular:  Heart Rate and Rhythm: Normal, Heart Sounds: Normal S1 and S2. No S3 or S4 noted.  Murmurs: No murmurs noted. No rubs, thrills, or gallops.   Arterial Pulses:  Posterior tibialis and dorsalis pedis pulses normal.   Lower Extremities: No edema noted.  Gastrointestinal:  Abdomen soft, non-distended, non-tender.   Musculoskeletal: Normal movement of extremities  Skin: Warm and dry.   Psychiatric: Patient alert and oriented to person, place, and time. Speech and behavior appropriate. Normal mood and affect.       ECG 12 Lead    Date/Time: 8/25/2022 11:04 AM  Performed by: Maci Menchaca APRN  Authorized by: Maci Menchaca APRN   Comparison: compared with previous ECG   Similar to previous ECG  Rhythm: sinus rhythm  BPM: 62  Conduction: left bundle branch block              Assessment:       Diagnosis Plan   1. Cardiomyopathy, nonischemic (HCC)  ECG 12 Lead    Comprehensive Metabolic Panel    T4, Free    Amiodarone Level    XR chest pa and lateral   2. ICD (implantable cardioverter-defibrillator), dual, in situ  ECG 12 Lead    Comprehensive Metabolic Panel    T4, Free    Amiodarone Level    XR chest pa and lateral   3. Ventricular tachycardia (HCC)  ECG 12 Lead   4. H/O prior ablation treatment--VT ablation 2018--Dr. Levy/[facility]     5. On amiodarone therapy  Comprehensive Metabolic Panel    T4, " Free    Amiodarone Level    XR chest pa and lateral   6. Gene mutation-DSP --c.1273 C>T, p.(R425*)     7. Essential hypertension            Plan:       1.-6. NICM, s/p ICD, VT, multiple ablations, last one 2018--Dr. Levy/--epicardial sourse, VT & ICD shocks 2/2020, then again in 12/2021---amio started 12/2021, no more VT---on 100mg daily, tolerating well, labs and CXR in May were okay.     7. HTN, controlled and low at times---monitor.     Follow up with Dr. Yancey in 3-4 months w/device check and will be due for amio labs and CXR (those orders are in and she knows to go have them done).     As always, it has been a pleasure to participate in your patient's care.      Sincerely,         WILD Steele

## 2022-09-30 ENCOUNTER — HOSPITAL ENCOUNTER (OUTPATIENT)
Dept: GENERAL RADIOLOGY | Facility: HOSPITAL | Age: 52
Discharge: HOME | End: 2022-09-30

## 2022-09-30 ENCOUNTER — LAB (OUTPATIENT)
Dept: LAB | Facility: HOSPITAL | Age: 52
End: 2022-09-30

## 2022-09-30 DIAGNOSIS — Z95.810 ICD (IMPLANTABLE CARDIOVERTER-DEFIBRILLATOR), DUAL, IN SITU: ICD-10-CM

## 2022-09-30 DIAGNOSIS — I42.8 CARDIOMYOPATHY, NONISCHEMIC: ICD-10-CM

## 2022-09-30 DIAGNOSIS — Z79.899 ON AMIODARONE THERAPY: ICD-10-CM

## 2022-09-30 LAB
ALBUMIN SERPL-MCNC: 4.3 G/DL (ref 3.5–5.2)
ALBUMIN/GLOB SERPL: 1.9 G/DL
ALP SERPL-CCNC: 110 U/L (ref 39–117)
ALT SERPL W P-5'-P-CCNC: 13 U/L (ref 1–33)
ANION GAP SERPL CALCULATED.3IONS-SCNC: 8 MMOL/L (ref 5–15)
AST SERPL-CCNC: 16 U/L (ref 1–32)
BILIRUB SERPL-MCNC: 0.5 MG/DL (ref 0–1.2)
BUN SERPL-MCNC: 13 MG/DL (ref 6–20)
BUN/CREAT SERPL: 13.7 (ref 7–25)
CALCIUM SPEC-SCNC: 9 MG/DL (ref 8.6–10.5)
CHLORIDE SERPL-SCNC: 104 MMOL/L (ref 98–107)
CO2 SERPL-SCNC: 27 MMOL/L (ref 22–29)
CREAT SERPL-MCNC: 0.95 MG/DL (ref 0.57–1)
EGFRCR SERPLBLD CKD-EPI 2021: 72.2 ML/MIN/1.73
GLOBULIN UR ELPH-MCNC: 2.3 GM/DL
GLUCOSE SERPL-MCNC: 96 MG/DL (ref 65–99)
POTASSIUM SERPL-SCNC: 4.3 MMOL/L (ref 3.5–5.2)
PROT SERPL-MCNC: 6.6 G/DL (ref 6–8.5)
SODIUM SERPL-SCNC: 139 MMOL/L (ref 136–145)
T4 FREE SERPL-MCNC: 1.83 NG/DL (ref 0.93–1.7)

## 2022-09-30 PROCEDURE — 36415 COLL VENOUS BLD VENIPUNCTURE: CPT

## 2022-09-30 PROCEDURE — 80053 COMPREHEN METABOLIC PANEL: CPT

## 2022-09-30 PROCEDURE — 71046 X-RAY EXAM CHEST 2 VIEWS: CPT

## 2022-09-30 PROCEDURE — 80151 DRUG ASSAY AMIODARONE: CPT

## 2022-09-30 PROCEDURE — 84439 ASSAY OF FREE THYROXINE: CPT

## 2022-09-30 RX ORDER — LISINOPRIL 20 MG/1
10 TABLET ORAL DAILY
Qty: 45 TABLET | Refills: 1 | Status: SHIPPED | OUTPATIENT
Start: 2022-09-30 | End: 2022-10-05 | Stop reason: SDUPTHER

## 2022-09-30 RX ORDER — AMIODARONE HYDROCHLORIDE 200 MG/1
100 TABLET ORAL DAILY
Qty: 30 TABLET | Refills: 3 | Status: SHIPPED | OUTPATIENT
Start: 2022-09-30 | End: 2023-01-03 | Stop reason: SDUPTHER

## 2022-10-03 ENCOUNTER — TELEPHONE (OUTPATIENT)
Dept: CARDIOLOGY | Facility: CLINIC | Age: 52
End: 2022-10-03

## 2022-10-03 NOTE — TELEPHONE ENCOUNTER
----- Message from WILD Lyle sent at 9/30/2022 10:27 AM EDT -----  Let her know that her labs and CXR look good, Her Free T4 (thyroid) is a tiny bit elevated, will just monitor for now--and consider rechecking when she comes in December

## 2022-10-05 RX ORDER — LISINOPRIL 20 MG/1
10 TABLET ORAL DAILY
Qty: 45 TABLET | Refills: 1 | Status: SHIPPED | OUTPATIENT
Start: 2022-10-05 | End: 2022-10-18 | Stop reason: SDUPTHER

## 2022-10-08 LAB
AMIODARONE SERPL-MCNC: 1353 NG/ML (ref 1000–2500)
DESETHYLAMIODARONE SERPL-MCNC: 957 NG/ML

## 2022-10-18 RX ORDER — LISINOPRIL 20 MG/1
10 TABLET ORAL DAILY
Qty: 45 TABLET | Refills: 1 | Status: SHIPPED | OUTPATIENT
Start: 2022-10-18

## 2022-10-28 RX ORDER — CARVEDILOL 6.25 MG/1
6.25 TABLET ORAL 2 TIMES DAILY WITH MEALS
Qty: 180 TABLET | Refills: 0 | Status: SHIPPED | OUTPATIENT
Start: 2022-10-28 | End: 2023-01-30 | Stop reason: SDUPTHER

## 2022-12-08 ENCOUNTER — OFFICE VISIT (OUTPATIENT)
Dept: CARDIOLOGY | Facility: CLINIC | Age: 52
End: 2022-12-08

## 2022-12-08 ENCOUNTER — CLINICAL SUPPORT NO REQUIREMENTS (OUTPATIENT)
Dept: CARDIOLOGY | Facility: CLINIC | Age: 52
End: 2022-12-08

## 2022-12-08 VITALS
HEIGHT: 65 IN | WEIGHT: 173 LBS | BODY MASS INDEX: 28.82 KG/M2 | SYSTOLIC BLOOD PRESSURE: 128 MMHG | HEART RATE: 63 BPM | DIASTOLIC BLOOD PRESSURE: 84 MMHG

## 2022-12-08 DIAGNOSIS — Z95.810 ICD (IMPLANTABLE CARDIOVERTER-DEFIBRILLATOR), DUAL, IN SITU: ICD-10-CM

## 2022-12-08 DIAGNOSIS — Z79.899 ON AMIODARONE THERAPY: ICD-10-CM

## 2022-12-08 DIAGNOSIS — I42.8 CARDIOMYOPATHY, NONISCHEMIC: ICD-10-CM

## 2022-12-08 DIAGNOSIS — Z15.89 GENE MUTATION: ICD-10-CM

## 2022-12-08 DIAGNOSIS — Z98.890 H/O PRIOR ABLATION TREATMENT: ICD-10-CM

## 2022-12-08 DIAGNOSIS — I42.8 NICM (NONISCHEMIC CARDIOMYOPATHY): Primary | ICD-10-CM

## 2022-12-08 DIAGNOSIS — I47.20 VENTRICULAR TACHYCARDIA: ICD-10-CM

## 2022-12-08 PROCEDURE — 93282 PRGRMG EVAL IMPLANTABLE DFB: CPT | Performed by: INTERNAL MEDICINE

## 2022-12-08 PROCEDURE — 93000 ELECTROCARDIOGRAM COMPLETE: CPT | Performed by: NURSE PRACTITIONER

## 2022-12-08 PROCEDURE — 99214 OFFICE O/P EST MOD 30 MIN: CPT | Performed by: NURSE PRACTITIONER

## 2022-12-08 NOTE — PROGRESS NOTES
Date of Office Visit: 2022  Encounter Provider: WILD Lyle  Place of Service: [facility] CARDIOLOGY  Patient Name: Tiffani Fiore  :1970    Chief Complaint   Patient presents with   • NICM   • ventricular tachycardia    • ICD check   :     HPI: Tiffani Fiore is a 52 y.o. female who follows with Dr. Yancey for NICM, s/p multiple VT ablations, last one ---[facility]/Dr. Levy--epicardial source.     VT 2020---but did okay, monitored, more VT 2021>>VT/ICD shocks, loaded with amiodarone.     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     Presents for follow up.     I saw her in August---per my note she was taking 100 mg amiodarone daily but she reports 200mg daily---we think she decreased wrong med, she was also having trouble with hypotension at some point so decreased carvedilol to daily and cut lisinopril in ---however from a cardiac standpoint she is doing good.     She has not had any episodes that she is aware of---no chest pain, dyspnea, PND, orthopnea or edema.     Device interrogation showed normal testing and function. 1 episode labeled NSVT, 28 beats in duration, appeared 1:1, asymptomatic.     She does report that she is having some hair thinning and emotional, she had a total hysterectomy.       Past Medical History:   Diagnosis Date   • Cardiomyopathy ([facility])    • Chest pain    • CHF (congestive heart failure) ([facility])    • Clostridium difficile colitis    • Depression    • DVT (deep venous thrombosis) ([facility])    • Dyspnea    • Fatigue    • Gene mutation     DSP --c. 1273 C>T, p.(R425*)   • H/O prior ablation treatment     VT ablation --Dr. Levy/[facility]   • History of paroxysmal atrial tachycardia    • History of transfusion    • Hyperlipidemia    • Hypertension    • Hypokalemia    • Hypomagnesemia    • ICD (implantable cardioverter-defibrillator) discharge    •  ICD (implantable cardioverter-defibrillator) in place     LEFT SUBCLAVIAN AREA   • Ischemic cardiomyopathy    • Migraine    • Myocardial infarction ([facility])    • Obesity due to excess calories    • Overweight (BMI 25.0-29.9)    • Palpitations     ICD   • PONV (postoperative nausea and vomiting)    • Pulmonary embolism (HCC)    • S/P ICD (internal cardiac defibrillator) procedure    • SVT (supraventricular tachycardia) ([facility])    • Tachycardia    • VT (ventricular tachycardia)     recurrent       Past Surgical History:   Procedure Laterality Date   • ABLATION OF DYSRHYTHMIC FOCUS  2018    [facility]   • BRONCHOSCOPY N/A 3/8/2018    Procedure: BRONCHOSCOPY WITH BAL;  Surgeon: Berto Nicholson MD;  Location: [facility] ENDOSCOPY;  Service:    • CARDIAC CATHETERIZATION N/A 2017    Procedure: Left Heart Cath;  Surgeon: Saúl Ann MD;  Location: [facility] INVASIVE LOCATION;  Service:    • CARDIAC CATHETERIZATION N/A 2017    Procedure: Left ventriculography;  Surgeon: Saúl Ann MD;  Location: [facility] INVASIVE LOCATION;  Service:    • CARDIAC CATHETERIZATION N/A 2017    Procedure: Coronary angiography;  Surgeon: Saúl Ann MD;  Location: [facility] INVASIVE LOCATION;  Service:    • CARDIAC DEFIBRILLATOR PLACEMENT     • CARDIAC DEFIBRILLATOR PLACEMENT      CONNER DID LEAD REVISION WITH REPLACEMENT   • CARDIAC DEFIBRILLATOR PLACEMENT     • CARDIAC DEFIBRILLATOR PLACEMENT     • CARDIAC ELECTROPHYSIOLOGY PROCEDURE N/A 2017    Procedure: Ablation VT  PATIENT HAS BOSTON ICD;  Surgeon: Grayson Yancey MD;  Location: [facility] INVASIVE LOCATION;  Service:    •  SECTION     • HYSTERECTOMY     • IMPLANTABLE CARDIOVERTER DEFIBRILLATOR LEAD REPLACEMENT/POCKET REVISION N/A 10/20/2017    Procedure: ICD LEAD EXTRACTION TRANSVENOUS AND PLACEMENT OF RV AND LV LEAD;  Surgeon: Amrit Salgado MD;  Location: [facility]  OR 18/19;  Service:    • TUBAL ABDOMINAL LIGATION         Social History     Socioeconomic History   • Marital status: Single   Tobacco Use   • Smoking status: Former     Packs/day: 0.50     Years: 15.00     Pack years: 7.50     Types: Cigarettes     Quit date: 2012     Years since quitting: 10.9   • Smokeless tobacco: Former   • Tobacco comments:     quit age 42   Vaping Use   • Vaping Use: Never used   Substance and Sexual Activity   • Alcohol use: No   • Drug use: No   • Sexual activity: Defer       Family History   Problem Relation Age of Onset   • Diabetes Father    • Cancer Father         lung   • COPD Mother    • Malig Hyperthermia Neg Hx        Review of Systems   Constitutional: Negative for chills, fever and malaise/fatigue.   Cardiovascular: Negative for chest pain, dyspnea on exertion, leg swelling, near-syncope, orthopnea, palpitations, paroxysmal nocturnal dyspnea and syncope.   Respiratory: Negative for cough and shortness of breath.    Skin:        Hair thinning   Musculoskeletal: Negative for joint pain, joint swelling and myalgias.   Gastrointestinal: Negative for abdominal pain, diarrhea, melena, nausea and vomiting.   Genitourinary: Negative for frequency and hematuria.   Neurological: Negative for light-headedness, numbness, paresthesias and seizures.   Psychiatric/Behavioral:        Emotional   Allergic/Immunologic: Negative.    All other systems reviewed and are negative.      No Known Allergies      Current Outpatient Medications:   •  amiodarone (PACERONE) 200 MG tablet, Take 0.5 tablets by mouth Daily. Until seen in the office in 3-4 weeks (Patient taking differently: Take 200 mg by mouth Daily. Until seen in the office in 3-4 weeks), Disp: 30 tablet, Rfl: 3  •  carvedilol (COREG) 6.25 MG tablet, Take 1 tablet by mouth 2 (Two) Times a Day With Meals., Disp: 180 tablet, Rfl: 0  •  lisinopril (PRINIVIL,ZESTRIL) 20 MG tablet, Take 0.5 tablets by mouth Daily., Disp: 45 tablet, Rfl: 1  •   "spironolactone (ALDACTONE) 25 MG tablet, Take 1 tablet by mouth Daily., Disp: 30 tablet, Rfl: 11      Objective:     Vitals:    12/08/22 0837   BP: 128/84   Pulse: 63   Weight: 78.5 kg (173 lb)   Height: 165.1 cm (65\")     Body mass index is 28.79 kg/m².    PHYSICAL EXAM:    Vitals Reviewed.   General Appearance: No acute distress, well developed and well nourished.   Eyes: Conjunctiva and lids: No erythema, swelling, or discharge. Sclera non-icteric.   HENT: Atraumatic, normocephalic. External eyes, ears, and nose normal.   Respiratory: No signs of respiratory distress. Respiration rhythm and depth normal.   Clear to auscultation. No rales, crackles, rhonchi, or wheezing auscultated.   Cardiovascular:  Heart Rate and Rhythm: Normal, Heart Sounds: Normal S1 and S2. No S3 or S4 noted.  Murmurs: No murmurs noted. No rubs, thrills, or gallops.   Arterial Pulses:  Posterior tibialis and dorsalis pedis pulses normal.   Lower Extremities: No edema noted.  Gastrointestinal:  Abdomen soft, non-distended, non-tender.   Musculoskeletal: Normal movement of extremities  Skin: Warm and dry.   Psychiatric: Patient alert and oriented to person, place, and time. Speech and behavior appropriate. Normal mood and affect.       ECG 12 Lead    Date/Time: 12/8/2022 10:51 AM  Performed by: Maci Menchaca APRN  Authorized by: Maci Menchaca APRN   Comparison: compared with previous ECG   Similar to previous ECG  Rhythm: sinus rhythm  BPM: 63  Conduction: left bundle branch block              Assessment:       Diagnosis Plan   1. Cardiomyopathy, nonischemic (HCC)        2. ICD (implantable cardioverter-defibrillator), dual, in situ        3. H/O prior ablation treatment--VT ablation 2018--Dr. Levy/[facility]        4. Ventricular tachycardia (HCC)  Comprehensive Metabolic Panel    T4, Free    Amiodarone Level    XR chest pa and lateral      5. On amiodarone therapy  Comprehensive Metabolic Panel    T4, Free    Amiodarone Level    XR chest pa and " lateral      6. Gene mutation-DSP --c.1273 C>T, p.(R425*)               Plan:       NICM, s/p ICD, VT, s/p multiple ablations--recurrent VT w/ICD shocks 12/2021--started on amio---has been pretty stable. Positive for DSP gene.    She had amio labs in Sept but is complaining of hair thinning and feeling emotional---wonders if thyroid off---will check amio labs and CXR.     Will call with lab and CXR results when available, for now follow up with Dr. Yancey in 6 months w/device check.     As always, it has been a pleasure to participate in your patient's care.      Sincerely,         WILD Steele

## 2022-12-10 ENCOUNTER — TELEMEDICINE (OUTPATIENT)
Dept: FAMILY MEDICINE CLINIC | Facility: TELEHEALTH | Age: 52
End: 2022-12-10

## 2022-12-10 DIAGNOSIS — R39.89 SUSPECTED UTI: Primary | ICD-10-CM

## 2022-12-10 PROCEDURE — 99213 OFFICE O/P EST LOW 20 MIN: CPT | Performed by: NURSE PRACTITIONER

## 2022-12-10 RX ORDER — SULFAMETHOXAZOLE AND TRIMETHOPRIM 800; 160 MG/1; MG/1
1 TABLET ORAL 2 TIMES DAILY
Qty: 6 TABLET | Refills: 0 | Status: SHIPPED | OUTPATIENT
Start: 2022-12-10 | End: 2022-12-13

## 2022-12-10 RX ORDER — PHENAZOPYRIDINE HYDROCHLORIDE 200 MG/1
200 TABLET, FILM COATED ORAL 3 TIMES DAILY PRN
Qty: 6 TABLET | Refills: 0 | Status: SHIPPED | OUTPATIENT
Start: 2022-12-10

## 2022-12-10 NOTE — PROGRESS NOTES
Subjective   Tiffani Fiore is a 52 y.o. female.     History of Present Illness  Her symptoms started today and include pain and frequency, abdominal pain and bloating. She has azo uti detection strips which showed nitrites. She has not taken antibiotics recently.        The following portions of the patient's history were reviewed and updated as appropriate: allergies, current medications, past family history, past medical history, past social history, past surgical history, and problem list.    Review of Systems   Constitutional: Negative for chills and fever.   Gastrointestinal: Positive for abdominal pain.   Genitourinary: Positive for dysuria and frequency.   Musculoskeletal: Positive for back pain.       Objective   Physical Exam  Constitutional:       General: She is not in acute distress.     Appearance: She is well-developed. She is not diaphoretic.   Pulmonary:      Effort: Pulmonary effort is normal.   Neurological:      Mental Status: She is alert and oriented to person, place, and time.   Psychiatric:         Behavior: Behavior normal.           Assessment & Plan   Diagnoses and all orders for this visit:    1. Suspected UTI (Primary)  -     sulfamethoxazole-trimethoprim (Bactrim DS) 800-160 MG per tablet; Take 1 tablet by mouth 2 (Two) Times a Day for 3 days.  Dispense: 6 tablet; Refill: 0  -     phenazopyridine (Pyridium) 200 MG tablet; Take 1 tablet by mouth 3 (Three) Times a Day As Needed for Bladder Spasms.  Dispense: 6 tablet; Refill: 0                 The use of a video visit has been reviewed with the patient and verbal informed consent has been obtained. Myself and Tiffani Fiore participated in this visit. The patient is located in [place], [place]. I am located in [place], [place]. Mychart and Zoom were utilized. I spent 10 minutes in the patient's chart for this visit.

## 2023-01-03 RX ORDER — AMIODARONE HYDROCHLORIDE 200 MG/1
200 TABLET ORAL DAILY
Qty: 30 TABLET | Refills: 2 | Status: SHIPPED | OUTPATIENT
Start: 2023-01-03

## 2023-01-30 RX ORDER — CARVEDILOL 6.25 MG/1
6.25 TABLET ORAL 2 TIMES DAILY WITH MEALS
Qty: 180 TABLET | Refills: 1 | Status: SHIPPED | OUTPATIENT
Start: 2023-01-30

## 2023-02-07 PROCEDURE — 93295 DEV INTERROG REMOTE 1/2/MLT: CPT | Performed by: INTERNAL MEDICINE

## 2023-02-07 PROCEDURE — 93296 REM INTERROG EVL PM/IDS: CPT | Performed by: INTERNAL MEDICINE

## 2023-03-13 ENCOUNTER — TELEPHONE (OUTPATIENT)
Dept: CARDIOLOGY | Facility: CLINIC | Age: 53
End: 2023-03-13
Payer: COMMERCIAL

## 2023-03-13 ENCOUNTER — LAB (OUTPATIENT)
Dept: LAB | Facility: HOSPITAL | Age: 53
End: 2023-03-13
Payer: COMMERCIAL

## 2023-03-13 ENCOUNTER — HOSPITAL ENCOUNTER (OUTPATIENT)
Dept: GENERAL RADIOLOGY | Facility: HOSPITAL | Age: 53
Discharge: HOME | End: 2023-03-13
Payer: COMMERCIAL

## 2023-03-13 DIAGNOSIS — I47.20 VENTRICULAR TACHYCARDIA: ICD-10-CM

## 2023-03-13 DIAGNOSIS — Z79.899 ON AMIODARONE THERAPY: ICD-10-CM

## 2023-03-13 LAB
ALBUMIN SERPL-MCNC: 4.4 G/DL (ref 3.5–5.2)
ALBUMIN/GLOB SERPL: 1.7 G/DL
ALP SERPL-CCNC: 121 U/L (ref 39–117)
ALT SERPL W P-5'-P-CCNC: 21 U/L (ref 1–33)
ANION GAP SERPL CALCULATED.3IONS-SCNC: 7.7 MMOL/L (ref 5–15)
AST SERPL-CCNC: 19 U/L (ref 1–32)
BILIRUB SERPL-MCNC: 0.4 MG/DL (ref 0–1.2)
BUN SERPL-MCNC: 17 MG/DL (ref 6–20)
BUN/CREAT SERPL: 17.9 (ref 7–25)
CALCIUM SPEC-SCNC: 9.2 MG/DL (ref 8.6–10.5)
CHLORIDE SERPL-SCNC: 102 MMOL/L (ref 98–107)
CO2 SERPL-SCNC: 27.3 MMOL/L (ref 22–29)
CREAT SERPL-MCNC: 0.95 MG/DL (ref 0.57–1)
EGFRCR SERPLBLD CKD-EPI 2021: 72.2 ML/MIN/1.73
GLOBULIN UR ELPH-MCNC: 2.6 GM/DL
GLUCOSE SERPL-MCNC: 97 MG/DL (ref 65–99)
POTASSIUM SERPL-SCNC: 4.4 MMOL/L (ref 3.5–5.2)
PROT SERPL-MCNC: 7 G/DL (ref 6–8.5)
SODIUM SERPL-SCNC: 137 MMOL/L (ref 136–145)
T4 FREE SERPL-MCNC: 1.75 NG/DL (ref 0.93–1.7)

## 2023-03-13 PROCEDURE — 80053 COMPREHEN METABOLIC PANEL: CPT

## 2023-03-13 PROCEDURE — 71046 X-RAY EXAM CHEST 2 VIEWS: CPT

## 2023-03-13 PROCEDURE — 36415 COLL VENOUS BLD VENIPUNCTURE: CPT

## 2023-03-13 PROCEDURE — 80151 DRUG ASSAY AMIODARONE: CPT

## 2023-03-13 PROCEDURE — 84439 ASSAY OF FREE THYROXINE: CPT

## 2023-03-13 NOTE — TELEPHONE ENCOUNTER
----- Message from WILD Lyle sent at 3/13/2023  9:22 AM EDT -----  Let her know her labs and CXR are all okay

## 2023-03-17 LAB
AMIODARONE SERPL-MCNC: 1623 NG/ML (ref 1000–2500)
DESETHYLAMIODARONE SERPL-MCNC: 1360 NG/ML

## 2023-03-20 ENCOUNTER — TELEPHONE (OUTPATIENT)
Dept: CARDIOLOGY | Facility: CLINIC | Age: 53
End: 2023-03-20
Payer: COMMERCIAL

## 2023-03-20 NOTE — TELEPHONE ENCOUNTER
----- Message from WILD Lyle sent at 3/20/2023  8:42 AM EDT -----  Let her know her amio level was okay

## 2023-04-17 RX ORDER — AMIODARONE HYDROCHLORIDE 200 MG/1
200 TABLET ORAL DAILY
Qty: 30 TABLET | Refills: 2 | Status: SHIPPED | OUTPATIENT
Start: 2023-04-17

## 2023-05-30 RX ORDER — SPIRONOLACTONE 25 MG/1
25 TABLET ORAL DAILY
Qty: 30 TABLET | Refills: 11 | Status: SHIPPED | OUTPATIENT
Start: 2023-05-30

## 2023-06-09 ENCOUNTER — CLINICAL SUPPORT NO REQUIREMENTS (OUTPATIENT)
Dept: CARDIOLOGY | Facility: CLINIC | Age: 53
End: 2023-06-09
Payer: COMMERCIAL

## 2023-06-09 ENCOUNTER — OFFICE VISIT (OUTPATIENT)
Dept: CARDIOLOGY | Facility: CLINIC | Age: 53
End: 2023-06-09
Payer: COMMERCIAL

## 2023-06-09 VITALS
DIASTOLIC BLOOD PRESSURE: 60 MMHG | SYSTOLIC BLOOD PRESSURE: 92 MMHG | HEIGHT: 65 IN | BODY MASS INDEX: 26.82 KG/M2 | WEIGHT: 161 LBS | HEART RATE: 54 BPM

## 2023-06-09 DIAGNOSIS — I47.20 VENTRICULAR TACHYCARDIA: ICD-10-CM

## 2023-06-09 DIAGNOSIS — Z79.899 ON AMIODARONE THERAPY: ICD-10-CM

## 2023-06-09 DIAGNOSIS — Z15.89 GENE MUTATION: ICD-10-CM

## 2023-06-09 DIAGNOSIS — Z98.890 H/O PRIOR ABLATION TREATMENT: ICD-10-CM

## 2023-06-09 DIAGNOSIS — I10 ESSENTIAL HYPERTENSION: ICD-10-CM

## 2023-06-09 DIAGNOSIS — I42.8 NICM (NONISCHEMIC CARDIOMYOPATHY): Primary | ICD-10-CM

## 2023-06-09 DIAGNOSIS — Z95.810 ICD (IMPLANTABLE CARDIOVERTER-DEFIBRILLATOR), DUAL, IN SITU: ICD-10-CM

## 2023-06-09 DIAGNOSIS — I42.8 CARDIOMYOPATHY, NONISCHEMIC: ICD-10-CM

## 2023-06-09 PROCEDURE — 99214 OFFICE O/P EST MOD 30 MIN: CPT | Performed by: NURSE PRACTITIONER

## 2023-06-09 PROCEDURE — 93000 ELECTROCARDIOGRAM COMPLETE: CPT | Performed by: NURSE PRACTITIONER

## 2023-06-09 RX ORDER — LISINOPRIL 5 MG/1
5 TABLET ORAL DAILY
Qty: 90 TABLET | Refills: 3 | Status: SHIPPED | OUTPATIENT
Start: 2023-06-09

## 2023-06-09 RX ORDER — SPIRONOLACTONE 25 MG/1
12.5 TABLET ORAL DAILY
Qty: 30 TABLET | Refills: 11
Start: 2023-06-09

## 2023-06-09 NOTE — PROGRESS NOTES
Date of Office Visit: 2023  Encounter Provider: WILD Lyle  Place of Service: [facility] CARDIOLOGY  Patient Name: Tiffani Fiore  :1970    Chief Complaint   Patient presents with    Pacemaker Check    Cardiomyopathy     6 mnth follow up   :     HPI: Tiffani Fiore is a 52 y.o. female who follows with Dr. Yancey---NICM, s/p multiple VT ablations, last one ---Dr. Verduzco/[facility]--epicardial source.    2020 VT---did okay, monitored, more VT 2021>>VT/ICD shocks, started amiodarone.    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     Presents for routine follow up and device check.    Doing okay from arrhythmia standpoint---having trouble with primarily orthostatic dizziness --hypotension.     She has lost weight over the last year--down 12 lbs since last visit but has lost significant amount over the last year.      Device interrogation shows normal function. 1 NST episode since last remote, appears to be ST---no therapies. She had CRT-D but programmed VVI @ 40---LV with really high threshold and does not need pacing atrial pacing at this time.      Past Medical History:   Diagnosis Date    Cardiomyopathy 2012    Chest pain     CHF (congestive heart failure)     Clostridium difficile colitis 2013    Depression     DVT (deep venous thrombosis) 2012    Dyspnea     Fatigue     Gene mutation     DSP --c. 1273 C>T, p.(R425*)    H/O prior ablation treatment     VT ablation --Dr. Levy/[facility]    History of paroxysmal atrial tachycardia     History of transfusion     Hyperlipidemia     Hypertension     Hypokalemia     Hypomagnesemia     ICD (implantable cardioverter-defibrillator) discharge     ICD (implantable cardioverter-defibrillator) in place     LEFT SUBCLAVIAN AREA    Ischemic cardiomyopathy     Migraine     Myocardial infarction 2012    Obesity due to excess calories     Overweight  (BMI 25.0-29.9)     Palpitations     ICD    PONV (postoperative nausea and vomiting)     Pulmonary embolism     S/P ICD (internal cardiac defibrillator) procedure     SVT (supraventricular tachycardia)     Tachycardia     VT (ventricular tachycardia)     recurrent       Past Surgical History:   Procedure Laterality Date    ABLATION OF DYSRHYTHMIC FOCUS  2018    Multiple VT ablations, last on at -Dr Levy, epicardial VT 2018    BRONCHOSCOPY N/A 3/8/2018    Procedure: BRONCHOSCOPY WITH BAL;  Surgeon: Berto Nicholson MD;  Location: [facility] ENDOSCOPY;  Service:     CARDIAC CATHETERIZATION N/A 2017    Procedure: Left Heart Cath;  Surgeon: Saúl Ann MD;  Location: [facility] CATH INVASIVE LOCATION;  Service:     CARDIAC CATHETERIZATION N/A 2017    Procedure: Left ventriculography;  Surgeon: Saúl Ann MD;  Location: [facility] CATH INVASIVE LOCATION;  Service:     CARDIAC CATHETERIZATION N/A 2017    Procedure: Coronary angiography;  Surgeon: Saúl Ann MD;  Location: [facility] CATH INVASIVE LOCATION;  Service:     CARDIAC DEFIBRILLATOR PLACEMENT      CARDIAC DEFIBRILLATOR PLACEMENT      CONNER DID LEAD REVISION WITH REPLACEMENT    CARDIAC DEFIBRILLATOR PLACEMENT      CARDIAC DEFIBRILLATOR PLACEMENT      CARDIAC ELECTROPHYSIOLOGY PROCEDURE N/A 2017    Procedure: Ablation VT  PATIENT HAS BOSTON ICD;  Surgeon: Grayson Yancey MD;  Location: [facility] CATH INVASIVE LOCATION;  Service:      SECTION      HYSTERECTOMY      IMPLANTABLE CARDIOVERTER DEFIBRILLATOR LEAD REPLACEMENT/POCKET REVISION N/A 10/20/2017    Procedure: ICD LEAD EXTRACTION TRANSVENOUS AND PLACEMENT OF RV AND LV LEAD;  Surgeon: Amrit Salgado MD;  Location: [facility] OR ;  Service:     TUBAL ABDOMINAL LIGATION         Social History     Socioeconomic History    Marital status: Single   Tobacco Use    Smoking status: Former     Packs/day: 0.50     Years: 15.00     Pack years: 7.50     Types:  "Cigarettes     Quit date:      Years since quittin.4    Smokeless tobacco: Former    Tobacco comments:     quit age 42   Vaping Use    Vaping Use: Never used   Substance and Sexual Activity    Alcohol use: No    Drug use: No    Sexual activity: Defer       Family History   Problem Relation Age of Onset    Diabetes Father     Cancer Father         lung    COPD Mother     Malig Hyperthermia Neg Hx        Review of Systems   Constitutional: Negative for chills, fever and malaise/fatigue.   Cardiovascular:  Negative for chest pain, dyspnea on exertion, leg swelling, near-syncope, orthopnea, palpitations, paroxysmal nocturnal dyspnea and syncope.   Respiratory:  Negative for cough and shortness of breath.    Musculoskeletal:  Negative for joint pain, joint swelling and myalgias.   Gastrointestinal:  Negative for abdominal pain, diarrhea, melena, nausea and vomiting.   Genitourinary:  Negative for frequency and hematuria.   Neurological:  Positive for dizziness. Negative for light-headedness, numbness, paresthesias and seizures.   Allergic/Immunologic: Negative.    All other systems reviewed and are negative.    No Known Allergies      Current Outpatient Medications:     amiodarone (PACERONE) 200 MG tablet, Take 1 tablet by mouth Daily. Until seen in the office in 3-4 weeks, Disp: 30 tablet, Rfl: 2    carvedilol (COREG) 6.25 MG tablet, Take 1 tablet by mouth 2 (Two) Times a Day With Meals., Disp: 180 tablet, Rfl: 1    lisinopril (PRINIVIL,ZESTRIL) 5 MG tablet, Take 1 tablet by mouth Daily., Disp: 90 tablet, Rfl: 3    spironolactone (ALDACTONE) 25 MG tablet, Take 0.5 tablets by mouth Daily., Disp: 30 tablet, Rfl: 11      Objective:     Vitals:    23 0815   BP: 92/60   Pulse: 54   Weight: 73 kg (161 lb)   Height: 165.1 cm (65\")     Body mass index is 26.79 kg/m².    PHYSICAL EXAM:    Vitals Reviewed.   General Appearance: No acute distress, well developed and well nourished.   Eyes: Conjunctiva and lids: No " erythema, swelling, or discharge. Sclera non-icteric.   HENT: Atraumatic, normocephalic. External eyes, ears, and nose normal.   Respiratory: No signs of respiratory distress. Respiration rhythm and depth normal.   Clear to auscultation. No rales, crackles, rhonchi, or wheezing auscultated.   Cardiovascular:  Heart Rate and Rhythm: Normal, Heart Sounds: Normal S1 and S2. No S3 or S4 noted.  Murmurs: No murmurs noted. No rubs, thrills, or gallops.   Lower Extremities: No edema noted.  Gastrointestinal:  Abdomen soft, non-distended, non-tender.   Musculoskeletal: Normal movement of extremities  Skin: Warm and dry.   Psychiatric: Patient alert and oriented to person, place, and time. Speech and behavior appropriate. Normal mood and affect.       ECG 12 Lead    Date/Time: 6/9/2023 8:53 AM  Performed by: Maci Menchaca APRN  Authorized by: Maci Menchaca APRN   Comparison: compared with previous ECG   Similar to previous ECG  Rhythm: sinus rhythm  BPM: 54  Conduction: left bundle branch block          Assessment:       Diagnosis Plan   1. Cardiomyopathy, nonischemic  Comprehensive Metabolic Panel    Amiodarone Level    T4, Free    XR chest pa and lateral      2. Ventricular tachycardia  Comprehensive Metabolic Panel    Amiodarone Level    T4, Free    XR chest pa and lateral      3. H/O prior ablation treatment--VT ablation 2018--Dr. Levy/[facility]        4. ICD (implantable cardioverter-defibrillator), dual, in situ  XR chest pa and lateral      5. On amiodarone therapy  Comprehensive Metabolic Panel    Amiodarone Level    T4, Free    XR chest pa and lateral      6. Gene mutation-DSP --c.1273 C>T, p.(R425*)        7. Essential hypertension               Plan:     1.-5. NICM, VT, s/p ICD, s/p multiple VT ablations, last one 2018 w/Dr. Levy/[facility]--epicardial ablation, recurrent VT/therapies, currently stable on amiodarone 200 mg daily. Had labs in March---they were okay, due again in Sept, orders placed and she will have them  drawn sometime between Sept and next appt with Dr. Yancey.     6. Gene mutation---DSP, likely c/w genetic form of CM.     7. HTN---actually having some orthostatic hypotension---associated w/dizziness, have decreased meds in the past but still having fairly frequent episodes---will decrease spironolactone and lisinopril ---instructed to call if still having issues, can adjust further.     Follow up with Dr. Yancey in 6 months w/device check.     As always, it has been a pleasure to participate in your patient's care.      Sincerely,         WILD Steele

## 2023-08-16 RX ORDER — AMIODARONE HYDROCHLORIDE 200 MG/1
200 TABLET ORAL DAILY
Qty: 30 TABLET | Refills: 2 | Status: SHIPPED | OUTPATIENT
Start: 2023-08-16

## 2023-08-22 ENCOUNTER — TELEMEDICINE (OUTPATIENT)
Dept: FAMILY MEDICINE CLINIC | Facility: TELEHEALTH | Age: 53
End: 2023-08-22
Payer: COMMERCIAL

## 2023-08-22 DIAGNOSIS — U07.1 COVID-19 VIRUS INFECTION: Primary | ICD-10-CM

## 2023-08-22 PROCEDURE — 99213 OFFICE O/P EST LOW 20 MIN: CPT | Performed by: NURSE PRACTITIONER

## 2023-08-22 RX ORDER — ONDANSETRON 8 MG/1
8 TABLET, ORALLY DISINTEGRATING ORAL EVERY 8 HOURS PRN
Qty: 15 TABLET | Refills: 0 | Status: SHIPPED | OUTPATIENT
Start: 2023-08-22

## 2023-08-22 RX ORDER — ALBUTEROL SULFATE 90 UG/1
2 AEROSOL, METERED RESPIRATORY (INHALATION) EVERY 4 HOURS PRN
Qty: 18 G | Refills: 0 | Status: SHIPPED | OUTPATIENT
Start: 2023-08-22

## 2023-08-22 RX ORDER — LISINOPRIL 20 MG/1
TABLET ORAL
COMMUNITY
Start: 2023-07-08

## 2023-08-22 RX ORDER — METHYLPREDNISOLONE 4 MG/1
TABLET ORAL
Qty: 21 TABLET | Refills: 0 | Status: SHIPPED | OUTPATIENT
Start: 2023-08-22

## 2023-08-22 NOTE — PROGRESS NOTES
You have chosen to receive care through a telehealth visit.  Do you consent to use a video/audio connection for your medical care today? Yes     CHIEF COMPLAINT  No chief complaint on file.        HPI  Tiffani Fiore is a 53 y.o. female  presents with complaint of tested positive for COVID on Friday, shortness of breath, productive cough, foggy in head, nausea, vomiting.  She denies fever, diarrhea, congestion in head, sore throat.  Is taking Mucinex FastMax for symptoms with little relief.     Review of Systems  See HPI    Past Medical History:   Diagnosis Date    Cardiomyopathy 2012    Chest pain     CHF (congestive heart failure)     Clostridium difficile colitis 2013    Depression     DVT (deep venous thrombosis) 2012    Dyspnea     Fatigue     Gene mutation     DSP --c. 1273 C>T, p.(R425*)    H/O prior ablation treatment     VT ablation --Dr. Levy/    History of paroxysmal atrial tachycardia     History of transfusion     Hyperlipidemia     Hypertension     Hypokalemia     Hypomagnesemia     ICD (implantable cardioverter-defibrillator) discharge     ICD (implantable cardioverter-defibrillator) in place     LEFT SUBCLAVIAN AREA    Ischemic cardiomyopathy     Migraine     Myocardial infarction 2012    Obesity due to excess calories     Overweight (BMI 25.0-29.9)     Palpitations     ICD    PONV (postoperative nausea and vomiting)     Pulmonary embolism     S/P ICD (internal cardiac defibrillator) procedure     SVT (supraventricular tachycardia)     Tachycardia     VT (ventricular tachycardia)     recurrent       Family History   Problem Relation Age of Onset    Diabetes Father     Cancer Father         lung    COPD Mother     Malig Hyperthermia Neg Hx        Social History     Socioeconomic History    Marital status: Single   Tobacco Use    Smoking status: Former     Packs/day: 0.50     Years: 15.00     Pack years: 7.50     Types: Cigarettes     Quit date:      Years since quittin.6     Smokeless tobacco: Former    Tobacco comments:     quit age 42   Vaping Use    Vaping Use: Never used   Substance and Sexual Activity    Alcohol use: No    Drug use: No    Sexual activity: Defer       Tiffani Fiore  reports that she quit smoking about 11 years ago. Her smoking use included cigarettes. She has a 7.50 pack-year smoking history. She has quit using smokeless tobacco..              LMP 08/23/1997     PHYSICAL EXAM  Physical Exam   Constitutional: She is oriented to person, place, and time. She appears well-developed and well-nourished. She does not have a sickly appearance. She does not appear ill.   HENT:   Head: Normocephalic and atraumatic.   Pulmonary/Chest: Effort normal.  No respiratory distress (mild shortness of air while talking).  Neurological: She is alert and oriented to person, place, and time.       Diagnoses and all orders for this visit:    1. COVID-19 virus infection (Primary)  -     albuterol sulfate  (90 Base) MCG/ACT inhaler; Inhale 2 puffs Every 4 (Four) Hours As Needed for Wheezing or Shortness of Air.  Dispense: 18 g; Refill: 0  -     methylPREDNISolone (MEDROL) 4 MG dose pack; Take as directed on package instructions.  Dispense: 21 tablet; Refill: 0  -     ondansetron ODT (ZOFRAN-ODT) 8 MG disintegrating tablet; Place 1 tablet on the tongue Every 8 (Eight) Hours As Needed for Nausea or Vomiting.  Dispense: 15 tablet; Refill: 0  -     Nirmatrelvir&Ritonavir 300/100 (PAXLOVID) 20 x 150 MG & 10 x 100MG tablet therapy pack tablet; Take 3 tablets by mouth 2 (Two) Times a Day.  Dispense: 30 tablet; Refill: 0    --take medications as prescribed  --increase fluids, rest as needed, tylenol or ibuprofen for pain  --f/u in 5-7 days if no improvement; ER if worsening symptoms        FOLLOW-UP  As discussed during visit with PCP/[facility] if no improvement or Urgent Care/Emergency Department if worsening of symptoms    Patient verbalizes understanding of medication dosage,  comfort measures, instructions for treatment and follow-up.    Brittany Quan, APRN  08/22/2023  17:14 EDT    The use of a video visit has been reviewed with the patient and verbal informed consent has been obtained. Myself and Tiffani Fiore participated in this visit. The patient is located in [address] [place] [address].    I am located in [place], [place]. Mychart and Twilio were utilized. I spent 8 minutes in the patient's chart for this visit.

## 2023-08-22 NOTE — PATIENT INSTRUCTIONS
"Here are the facts about Paxlovid. Please review and acknowledge.      Authorized Use     The FDA has authorized the emergency use of PAXLOVID, an investigational medicine, for the treatment of mild-to-moderate COVID-19 in adults and children (12 years of age and older weighing at least 88 pounds [40 kg]) with a positive test for the virus that causes COVID-19, and who are at high risk for progression to severe COVID-19, including hospitalization or death, under an EUA.     PAXLOVID is investigational because it is still being studied. There is limited information about the safety and effectiveness of using PAXLOVID to treat people with mild-to-moderate COVID-19.  FACT SHEET FOR PATIENTS, PARENTS, AND CAREGIVERS  EMERGENCY USE AUTHORIZATION (EUA) OF PAXLOVID  FOR CORONAVIRUS DISEASE 2019 (COVID-19)  You are being given this Fact Sheet because your healthcare provider believes it is   necessary to provide you with PAXLOVID for the treatment of mild-to-moderate   coronavirus disease (COVID-19) caused by the SARS-CoV-2 virus. This Fact Sheet   contains information to help you understand the risks and benefits of taking the   PAXLOVID you have received or may receive.   The U.S. Food and Drug Administration (FDA) has issued an Emergency Use   Authorization (EUA) to make PAXLOVID available during the COVID-19 pandemic (for   more details about an EUA please see "What is an Emergency Use Authorization?"   at the end of this document). PAXLOVID is not an FDA-approved medicine in the   [place]. Read this Fact Sheet for information about PAXLOVID. Talk to your   healthcare provider about your options or if you have any questions. It is your choice to   take PAXLOVID.     What is COVID-19?  COVID-19 is caused by a virus called a coronavirus. You can get COVID-19 through   close contact with another person who has the virus.   COVID-19 illnesses have ranged from very mild-to-severe, including illness resulting in "   death. While information so far suggests that most COVID-19 illness is mild, serious   illness can happen and may cause some of your other medical conditions to become   worse. Older people and people of all ages with severe, long lasting (chronic) medical   conditions like heart disease, lung disease, and diabetes, for example seem to be at   higher risk of being hospitalized for COVID-19.     What is PAXLOVID?  PAXLOVID is an investigational medicine used to treat mild-to-moderate COVID-19 in   adults and children [12 years of age and older weighing at least 88 pounds (40 kg)] with   positive results of direct SARS-CoV-2 viral testing, and who are at high risk for   progression to severe COVID-19, including hospitalization or death. PAXLOVID is   investigational because it is still being studied. There is limited information about the   safety and effectiveness of using PAXLOVID to treat people with mild-to-moderate   COVID-19.    The FDA has authorized the emergency use of PAXLOVID for the treatment of mild-tomoderate COVID-19 in adults and children [12 years of age and older weighing at least   88 pounds (40 kg)] with a positive test for the virus that causes COVID-19, and who are   at high risk for progression to severe COVID-19, including hospitalization or death,   under an EUA.  What should I tell my healthcare provider before I take PAXLOVID?  Tell your healthcare provider if you:   Have any allergies   Have liver or kidney disease   Are pregnant or plan to become pregnant   Are breastfeeding a child   Have any serious illnesses    Tell your healthcare provider about all the medicines you take, including   prescription and over-the-counter medicines, vitamins, and herbal supplements.   Some medicines may interact with PAXLOVID and may cause serious side effects.   Keep a list of your medicines to show your healthcare provider and pharmacist when   you get a new medicine.   You can ask your healthcare  provider or pharmacist for a list of medicines that interact   with PAXLOVID. Do not start taking a new medicine without telling your   healthcare provider. Your healthcare provider can tell you if it is safe to take   PAXLOVID with other medicines.   Tell your healthcare provider if you are taking combined hormonal contraceptive.  PAXLOVID may affect how your birth control pills work. Females who are able to   become pregnant should use another effective alternative form of contraception or an   additional barrier method of contraception. Talk to your healthcare provider if you have   any questions about contraceptive methods that might be right for you.    How do I take PAXLOVID?    PAXLOVID consists of 2 medicines: nirmatrelvir and ritonavir.  o Take 2 pink tablets of nirmatrelvir with 1 white tablet of ritonavir by mouth  2 times each day (in the morning and in the evening) for 5 days. For each  dose, take all 3 tablets at the same time.  o If you have kidney disease, talk to your healthcare provider. You  may need a different dose.   Swallow the tablets whole. Do not chew, break, or crush the tablets.   Take PAXLOVID with or without food.   Do not stop taking PAXLOVID without talking to your healthcare provider, even if  you feel better.   If you miss a dose of PAXLOVID within 8 hours of the time it is usually taken,  take it as soon as you remember. If you miss a dose by more than 8 hours, skip  the missed dose and take the next dose at your regular time. Do not take  2 doses of PAXLOVID at the same time.   If you take too much PAXLOVID, call your healthcare provider or go to the  nearest hospital emergency room right away.   If you are taking a ritonavir- or cobicistat-containing medicine to treat hepatitis C  or Human Immunodeficiency Virus (HIV), you should continue to take your  medicine as prescribed by your healthcare provider.  3   Talk to your healthcare provider if you do not feel better or if you  feel worse after   5 days.    Who should generally not take PAXLOVID?   Do not take PAXLOVID if:   You are allergic to nirmatrelvir, ritonavir, or any of the ingredients in PAXLOVID.   You are taking any of the following medicines:  o Alfuzosin  o Pethidine, piroxicam, propoxyphene  o Ranolazine  o Amiodarone, dronedarone, flecainide, propafenone, quinidine  o Colchicine  o Lurasidone, pimozide, clozapine  o Dihydroergotamine, ergotamine, methylergonovine  o Lovastatin, simvastatin  o Sildenafil (Revatior) for pulmonary arterial hypertension (PAH)  o Triazolam, oral midazolam  o Apalutamide  o Carbamazepine, phenobarbital, phenytoin  o Rifampin  o Hieu's Wort (hypericum perforatum)  Taking PAXLOVID with these medicines may cause serious or life-threatening side   effects or affect how PAXLOVID works.     These are not the only medicines that may cause serious side effects if taken with   PAXLOVID. PAXLOVID may increase or decrease the levels of multiple other   medicines. It is very important to tell your healthcare provider about all of the medicines   you are taking because additional laboratory tests or changes in the dose of your other   medicines may be necessary while you are taking PAXLOVID. Your healthcare provider   may also tell you about specific symptoms to watch out for that may indicate that you   need to stop or decrease the dose of some of your other medicines.    What are the important possible side effects of PAXLOVID?   Possible side effects of PAXLOVID are:   Liver Problems. Tell your healthcare provider right away if you have any of  these signs and symptoms of liver problems: loss of appetite, yellowing of your  skin and the whites of eyes (jaundice), dark-colored urine, pale colored stools  and itchy skin, stomach area (abdominal) pain.   Resistance to HIV Medicines. If you have untreated HIV infection, PAXLOVID  may lead to some HIV medicines not working as well in the future.  4    Other  possible side effects include:  o altered sense of taste  o diarrhea  o high blood pressure  o muscle aches  These are not all the possible side effects of PAXLOVID. Not many people have taken  PAXLOVID. Serious and unexpected side effects may happen. PAXLOVID is still being   studied, so it is possible that all of the risks are not known at this time.    What other treatment choices are there?  Like PAXLOVID, FDA may allow for the emergency use of other medicines to treat   people with COVID-19. Go to https://www.fda.gov/emergency-preparedness-andresponse/mcm-legal-regulatory-and-policy-framework/emergency-use-authorization for   information on the emergency use of other medicines that are authorized by FDA to   treat people with COVID-19. Your healthcare provider may talk with you about clinical   trials for which you may be eligible.   It is your choice to be treated or not to be treated with PAXLOVID. Should you decide   not to receive it or for your child not to receive it, it will not change your standard   medical care.    What if I am pregnant or breastfeeding?   There is no experience treating pregnant women or breastfeeding mothers with   PAXLOVID. For a mother and unborn baby, the benefit of taking PAXLOVID may be   greater than the risk from the treatment. If you are pregnant, discuss your options and   specific situation with your healthcare provider.   It is recommended that you use effective barrier contraception or do not have sexual   activity while taking PAXLOVID.   If you are breastfeeding, discuss your options and specific situation with your   healthcare provider.     How do I report side effects with PAXLOVID?   Contact your healthcare provider if you have any side effects that bother you or do not   go away.   Report side effects to FDA MedWatch at www.fda.gov/medwatch or call 7-424-VSJ8973 or you can report side effects to [employer] at the contact information provided   below.  Website Fax  number Telephone number  [employer] [phone]  5     How should I store PAXLOVID?   Store PAXLOVID tablets at room temperature, between 68?F to 77?F (20?C to 25?C).  How can I learn more about COVID-19?    Ask your healthcare provider.   Visit https://www.cdc.gov/COVID19.   Contact your local or state public health department.  What is an Emergency Use Authorization (EUA)?   The [place] FDA has made PAXLOVID available under an emergency access   mechanism called an Emergency Use Authorization (EUA). The EUA is supported by a    of Health and Human Service (HHS) declaration that circumstances exist to   justify the emergency use of drugs and biological products during the COVID-19   pandemic.     PAXLOVID for the treatment of mild-to-moderate COVID-19 in adults and children   [12 years of age and older weighing at least 88 pounds (40 kg)] with positive results of   direct SARS-CoV-2 viral testing, and who are at high risk for progression to severe   COVID-19, including hospitalization or death, has not undergone the same type of   review as an FDA-approved product. In issuing an EUA under the COVID-19 public   health emergency, the FDA has determined, among other things, that based on the   total amount of scientific evidence available including data from adequate and   well-controlled clinical trials, if available, it is reasonable to believe that the product may   be effective for diagnosing, treating, or preventing COVID-19, or a serious or   life-threatening disease or condition caused by COVID-19; that the known and potential   benefits of the product, when used to diagnose, treat, or prevent such disease or   condition, outweigh the known and potential risks of such product; and that there are no   adequate, approved, and available alternatives.    All of these criteria must be met to allow for the product to be used in the treatment of   patients during the  COVID-19 pandemic. The EUA for PAXLOVID is in effect for the   duration of the COVID-19 declaration justifying emergency use of this product, unless  terminated or revoked (after which the products may no longer be used under the   EUA).  6     Additional Information  For general questions, visit the website or call the telephone number provided below.   Website Telephone number  [employer]  [phone]  (9-104-F11-QAOD)  You can also go to [email or web address] or call [phone] for more   information.  [id number]   Revised: 12/22/2021

## 2023-10-05 RX ORDER — CARVEDILOL 6.25 MG/1
6.25 TABLET ORAL 2 TIMES DAILY WITH MEALS
Qty: 180 TABLET | Refills: 3 | Status: SHIPPED | OUTPATIENT
Start: 2023-10-05

## 2023-10-16 RX ORDER — CARVEDILOL 6.25 MG/1
6.25 TABLET ORAL 2 TIMES DAILY WITH MEALS
Qty: 180 TABLET | Refills: 3 | Status: SHIPPED | OUTPATIENT
Start: 2023-10-16

## 2023-10-30 ENCOUNTER — TELEPHONE (OUTPATIENT)
Age: 53
End: 2023-10-30
Payer: COMMERCIAL

## 2023-10-30 NOTE — TELEPHONE ENCOUNTER
Patient left voicemail this morning complaining of intermittent, sharp, shooting pain from left armpit into chest.    Please call to triage further.

## 2023-10-30 NOTE — TELEPHONE ENCOUNTER
Called and spoke with patient. For the past week and a half she has been experiencing intermittent chest pain. She said she can just be sitting still and she will get a sharp pain from her left armpit to her heart. It happens with rest and exertion. Her B/P has also been up and down. When her B/P is higher, she gets headaches. Today B/P is 130/87. HR 63.    Scheduled to see Maci on Wednesday. Pt verbalized understanding.    Thank you,    Natalia Zapata RN  Triage [facility]  10/30/23 10:49 EDT

## 2023-10-31 NOTE — TELEPHONE ENCOUNTER
Called and spoke with patient. Went over information from Maci. Pt verbalized understanding.    Pacemaker, is there room to add this patient for a device check tomorrow? She sees Maci at 11:30.    Thank you,    Natalia Zapata, RN  [place] [facility]  10/31/23 15:14 EDT

## 2023-10-31 NOTE — TELEPHONE ENCOUNTER
You can let her know that intermittent sharp pains are usually not cardiac in origin but we can talk more about it when she comes in    She will need a device check also, please be sure that is on the schedule prior to her appt with me

## 2023-11-01 ENCOUNTER — OFFICE VISIT (OUTPATIENT)
Age: 53
End: 2023-11-01
Payer: COMMERCIAL

## 2023-11-01 ENCOUNTER — HOSPITAL ENCOUNTER (OUTPATIENT)
Dept: GENERAL RADIOLOGY | Facility: HOSPITAL | Age: 53
Discharge: HOME | End: 2023-11-01
Admitting: NURSE PRACTITIONER
Payer: COMMERCIAL

## 2023-11-01 ENCOUNTER — CLINICAL SUPPORT NO REQUIREMENTS (OUTPATIENT)
Age: 53
End: 2023-11-01
Payer: COMMERCIAL

## 2023-11-01 ENCOUNTER — LAB (OUTPATIENT)
Dept: LAB | Facility: HOSPITAL | Age: 53
End: 2023-11-01
Payer: COMMERCIAL

## 2023-11-01 VITALS
BODY MASS INDEX: 25.33 KG/M2 | HEART RATE: 60 BPM | DIASTOLIC BLOOD PRESSURE: 78 MMHG | HEIGHT: 65 IN | SYSTOLIC BLOOD PRESSURE: 110 MMHG | WEIGHT: 152 LBS

## 2023-11-01 DIAGNOSIS — I47.20 VENTRICULAR TACHYCARDIA: ICD-10-CM

## 2023-11-01 DIAGNOSIS — I10 ESSENTIAL HYPERTENSION: ICD-10-CM

## 2023-11-01 DIAGNOSIS — Z15.89 GENE MUTATION: ICD-10-CM

## 2023-11-01 DIAGNOSIS — Z95.810 ICD (IMPLANTABLE CARDIOVERTER-DEFIBRILLATOR), DUAL, IN SITU: ICD-10-CM

## 2023-11-01 DIAGNOSIS — I42.8 CARDIOMYOPATHY, NONISCHEMIC: ICD-10-CM

## 2023-11-01 DIAGNOSIS — Z79.899 ON AMIODARONE THERAPY: ICD-10-CM

## 2023-11-01 DIAGNOSIS — I42.8 CARDIOMYOPATHY, NONISCHEMIC: Primary | ICD-10-CM

## 2023-11-01 DIAGNOSIS — R07.89 CHEST PAIN, ATYPICAL: ICD-10-CM

## 2023-11-01 PROBLEM — E66.9 OBESITY: Status: RESOLVED | Noted: 2017-06-21 | Resolved: 2023-11-01

## 2023-11-01 LAB
ALBUMIN SERPL-MCNC: 4.3 G/DL (ref 3.5–5.2)
ALBUMIN/GLOB SERPL: 1.7 G/DL
ALP SERPL-CCNC: 90 U/L (ref 39–117)
ALT SERPL W P-5'-P-CCNC: 16 U/L (ref 1–33)
ANION GAP SERPL CALCULATED.3IONS-SCNC: 7 MMOL/L (ref 5–15)
AST SERPL-CCNC: 18 U/L (ref 1–32)
BILIRUB SERPL-MCNC: 0.6 MG/DL (ref 0–1.2)
BUN SERPL-MCNC: 17 MG/DL (ref 6–20)
BUN/CREAT SERPL: 18.7 (ref 7–25)
CALCIUM SPEC-SCNC: 9.4 MG/DL (ref 8.6–10.5)
CHLORIDE SERPL-SCNC: 105 MMOL/L (ref 98–107)
CO2 SERPL-SCNC: 26 MMOL/L (ref 22–29)
CREAT SERPL-MCNC: 0.91 MG/DL (ref 0.57–1)
EGFRCR SERPLBLD CKD-EPI 2021: 75.6 ML/MIN/1.73
GLOBULIN UR ELPH-MCNC: 2.5 GM/DL
GLUCOSE SERPL-MCNC: 96 MG/DL (ref 65–99)
POTASSIUM SERPL-SCNC: 4.2 MMOL/L (ref 3.5–5.2)
PROT SERPL-MCNC: 6.8 G/DL (ref 6–8.5)
SODIUM SERPL-SCNC: 138 MMOL/L (ref 136–145)
T4 FREE SERPL-MCNC: 1.72 NG/DL (ref 0.93–1.7)

## 2023-11-01 PROCEDURE — 80053 COMPREHEN METABOLIC PANEL: CPT

## 2023-11-01 PROCEDURE — 93000 ELECTROCARDIOGRAM COMPLETE: CPT | Performed by: NURSE PRACTITIONER

## 2023-11-01 PROCEDURE — 71046 X-RAY EXAM CHEST 2 VIEWS: CPT

## 2023-11-01 PROCEDURE — 99214 OFFICE O/P EST MOD 30 MIN: CPT | Performed by: NURSE PRACTITIONER

## 2023-11-01 PROCEDURE — 36415 COLL VENOUS BLD VENIPUNCTURE: CPT

## 2023-11-01 PROCEDURE — 84439 ASSAY OF FREE THYROXINE: CPT

## 2023-11-01 PROCEDURE — 80151 DRUG ASSAY AMIODARONE: CPT

## 2023-11-01 NOTE — PROGRESS NOTES
Date of Office Visit: 2023  Encounter Provider: WILD Lyle  Place of Service: [facility] CARDIOLOGY  Patient Name: Tiffani Fiore  :1970    Chief Complaint   Patient presents with    Cardiomyopathy, nonischemic     CP and headaches   :     HPI: Tiffani Fiore is a 53 y.o. female who follows with Dr. Yancey for NICM, status post multiple VT ablations, the last one done in 2018 by /--epicardial source.     2020 Vt--did okay, monitored, more VT 2021>>VT/ICD shocks, started on amiodarone.    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    She called and requested an appointment due to complaints of chest discomfort.     She had Covid in August and lost about 10 lbs---over the last couple of months she has intermittent sharp, stabbing, shooting pains that start around her device in the left subclavian area and shoot across or down her chest--they only last seconds---not necessarily positional sometimes happens when just sitting.     Device interrogation shows normal function---no arrhythmia episodes and sensation not reproducible with testing or manipulation of device area/pocket. She has a CRT-D but programmed VVI @ 40---LV with really high threshold and does not need pacing atrial pacing at this time.      She did end up stopping her lisinopril all together due to orthostatic dizziness---it has resolved since being off lisinopril, she did try to taper it down but intolerant even of lower doses.     She is on spironolactone 12.5 mg daily and carvedilol 6.25 mg BID.     Past Medical History:   Diagnosis Date    Cardiomyopathy 2012    Chest pain     CHF (congestive heart failure)     Clostridium difficile colitis 2013    Depression     DVT (deep venous thrombosis) 2012    Dyspnea     Fatigue     Gene mutation     DSP --c. 1273 C>T, p.(R425*)    H/O prior  ablation treatment     VT ablation --Dr. Levy/[facility]    History of paroxysmal atrial tachycardia     History of transfusion     Hyperlipidemia     Hypertension     Hypokalemia     Hypomagnesemia     ICD (implantable cardioverter-defibrillator) discharge     ICD (implantable cardioverter-defibrillator) in place     LEFT SUBCLAVIAN AREA    Ischemic cardiomyopathy     Migraine     Myocardial infarction     Obesity due to excess calories     Overweight (BMI 25.0-29.9)     Palpitations     ICD    PONV (postoperative nausea and vomiting)     Pulmonary embolism     S/P ICD (internal cardiac defibrillator) procedure     SVT (supraventricular tachycardia)     Tachycardia     VT (ventricular tachycardia)     recurrent       Past Surgical History:   Procedure Laterality Date    ABLATION OF DYSRHYTHMIC FOCUS  2018    Multiple VT ablations, last on at -Dr Levy, epicardial VT 2018    BRONCHOSCOPY N/A 3/8/2018    Procedure: BRONCHOSCOPY WITH BAL;  Surgeon: Berto Nicholson MD;  [facility];  Service:     CARDIAC CATHETERIZATION N/A 2017    Procedure: Left Heart Cath;  Surgeon: Saúl Ann MD;  Location: [facility] CATH INVASIVE LOCATION;  Service:     CARDIAC CATHETERIZATION N/A 2017    Procedure: Left ventriculography;  Surgeon: Saúl Ann MD;  [facility]AS[facility];  Service:     CARDIAC CATHETERIZATION N/A 2017    Procedure: Coronary angiography;  Surgeon: Saúl Ann MD;  [facility];  Service:     CARDIAC DEFIBRILLATOR PLACEMENT      CARDIAC DEFIBRILLATOR PLACEMENT      CONNER FREGOSO LEAD REVISION WITH REPLACEMENT    CARDIAC DEFIBRILLATOR PLACEMENT      CARDIAC DEFIBRILLATOR PLACEMENT      CARDIAC ELECTROPHYSIOLOGY PROCEDURE N/A 2017    Procedure: Ablation VT  PATIENT HAS BOSTON ICD;  Surgeon: Grayson Yancey MD;  Location: [facility] CATH INVASIVE LOCATION;  Service:      SECTION      HYSTERECTOMY       IMPLANTABLE CARDIOVERTER DEFIBRILLATOR LEAD REPLACEMENT/POCKET REVISION N/A 10/20/2017    Procedure: ICD LEAD EXTRACTION TRANSVENOUS AND PLACEMENT OF RV AND LV LEAD;  Surgeon: Amrit Salgado MD;  Location: [facility] ;  Service:     TUBAL ABDOMINAL LIGATION         Social History     Socioeconomic History    Marital status: Single   Tobacco Use    Smoking status: Former     Packs/day: 0.50     Years: 15.00     Additional pack years: 0.00     Total pack years: 7.50     Types: Cigarettes     Quit date:      Years since quittin.8    Smokeless tobacco: Former    Tobacco comments:     quit age 42   Vaping Use    Vaping Use: Never used   Substance and Sexual Activity    Alcohol use: No    Drug use: No    Sexual activity: Defer     Partners: Male       Family History   Problem Relation Age of Onset    Diabetes Father     Cancer Father         lung    COPD Mother     Malig Hyperthermia Neg Hx        Review of Systems   Constitutional: Negative for chills, fever and malaise/fatigue.   Cardiovascular:  Positive for chest pain. Negative for dyspnea on exertion, leg swelling, near-syncope, orthopnea, palpitations, paroxysmal nocturnal dyspnea and syncope.   Respiratory:  Negative for cough and shortness of breath.    Musculoskeletal:  Negative for joint pain, joint swelling and myalgias.   Gastrointestinal:  Negative for abdominal pain, diarrhea, melena, nausea and vomiting.   Genitourinary:  Negative for frequency and hematuria.   Neurological:  Negative for light-headedness, numbness, paresthesias and seizures.   Allergic/Immunologic: Negative.    All other systems reviewed and are negative.      No Known Allergies      Current Outpatient Medications:     amiodarone (PACERONE) 200 MG tablet, Take 1 tablet by mouth Daily., Disp: 30 tablet, Rfl: 2    carvedilol (COREG) 6.25 MG tablet, Take 1 tablet by mouth 2 (Two) Times a Day With Meals., Disp: 180 tablet, Rfl: 3    spironolactone (ALDACTONE) 25 MG  "tablet, Take 0.5 tablets by mouth Daily., Disp: 30 tablet, Rfl: 11      Objective:     Vitals:    11/01/23 1132   BP: 110/78   BP Location: Left arm   Patient Position: Sitting   Pulse: 60   Weight: 68.9 kg (152 lb)   Height: 165.1 cm (65\")     Body mass index is 25.29 kg/m².    PHYSICAL EXAM:    Vitals Reviewed.   General Appearance: No acute distress, well developed and well nourished.   Eyes: Conjunctiva and lids: No erythema, swelling, or discharge. Sclera non-icteric.   HENT: Atraumatic, normocephalic. External eyes, ears, and nose normal.   Respiratory: No signs of respiratory distress. Respiration rhythm and depth normal.   Clear to auscultation. No rales, crackles, rhonchi, or wheezing auscultated.   Cardiovascular:  Heart Rate and Rhythm: Normal, Heart Sounds: S1 and S2.   Murmurs: No murmurs noted. No rubs, thrills, or gallops.   Gastrointestinal:  Abdomen soft, non-distended, non-tender.   Musculoskeletal: Normal movement of extremities  Skin: Warm and dry.   Psychiatric: Patient alert and oriented to person, place, and time. Speech and behavior appropriate. Normal mood and affect.       ECG 12 Lead    Date/Time: 11/1/2023 11:57 AM  Performed by: Maci Menchaca APRN    Authorized by: Maci Menchcaa APRN  Comparison: compared with previous ECG   Similar to previous ECG  Rhythm: sinus rhythm  BPM: 61  Conduction: left bundle branch block            Assessment:       Diagnosis Plan   1. Chest pain, atypical        2. Cardiomyopathy, nonischemic  ECG 12 Lead      3. 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 ICD (implantable cardioverter-defibrillator), dual, in situ  ECG 12 Lead      5. Ventricular tachycardia        6. On amiodarone therapy  ECG 12 Lead      7. Essential hypertension               Plan:        Atypical chest pain---sharp, stabbing, shooting, brief, start around device area and shoot across of down--question whether her device is moving more in the pocket with her recent weight " loss. Device area within normal limits, no redness or hematoma. Will check CXR.     2.-6. NICM, gene mutation, s/p CRT-D but programmed VVI @ 40 due to high LV threshold and no need for atrial pacing at this time. VT, s/p multiple ablations, remains on amiodarone, no arrhythmia episodes. She is due for amio surveillance labs and CXR.     7. HTN, was actually have significant issues with orthostatic dizziness---much better/resolved since stopping lisinopril. She remains on spironolactone and carvedilol.     Will call with labs and CXR results, she has scheduled follow up in Dec already scheduled.     As always, it has been a pleasure to participate in your patient's care.      Sincerely,         WILD Steele

## 2023-11-02 ENCOUNTER — TELEPHONE (OUTPATIENT)
Age: 53
End: 2023-11-02
Payer: COMMERCIAL

## 2023-11-02 NOTE — TELEPHONE ENCOUNTER
----- Message from WILD Lyle sent at 11/1/2023  2:16 PM EDT -----  Let her know that her CXR looks okay---ask her if she got the labs drawn also--I don't see where it looks like they were collected.

## 2023-11-07 ENCOUNTER — TELEPHONE (OUTPATIENT)
Age: 53
End: 2023-11-07
Payer: COMMERCIAL

## 2023-11-07 LAB
AMIODARONE SERPL-MCNC: 1580 NG/ML (ref 1000–2500)
DESETHYLAMIODARONE SERPL-MCNC: 1358 NG/ML

## 2023-11-07 NOTE — TELEPHONE ENCOUNTER
----- Message from WILD Lyle sent at 11/7/2023 11:31 AM EST -----  Let her know her amio level was okay

## 2024-01-08 NOTE — TELEPHONE ENCOUNTER
Caller: Tiffani Fiore    Relationship: Self    Best call back number: [phone]     Requested Prescriptions:   Requested Prescriptions     Pending Prescriptions Disp Refills    amiodarone (PACERONE) 200 MG tablet 30 tablet 2     Sig: Take 1 tablet by mouth Daily.        Pharmacy where request should be sent: [facility] [id number] [place], [place] - [address] [facility] [address] [facility] [phone] [facility] [phone]      Last office visit with prescribing clinician: 5/9/2022   Last telemedicine visit with prescribing clinician: Visit date not found   Next office visit with prescribing clinician: Visit date not found     Additional details provided by patient: PATIENT HAS BEEN OUT FOR TWO DAYS    Does the patient have less than a 3 day supply:  [x] Yes  [] No    Would you like a call back once the refill request has been completed: [] Yes [] No    If the office needs to give you a call back, can they leave a voicemail: [] Yes [] No    Sadie Aguilar Rep   01/08/24 08:59 EST

## 2024-01-09 RX ORDER — AMIODARONE HYDROCHLORIDE 200 MG/1
200 TABLET ORAL DAILY
Qty: 30 TABLET | Refills: 2 | Status: SHIPPED | OUTPATIENT
Start: 2024-01-09

## 2024-01-31 ENCOUNTER — OFFICE VISIT (OUTPATIENT)
Age: 54
End: 2024-01-31
Payer: COMMERCIAL

## 2024-01-31 ENCOUNTER — CLINICAL SUPPORT NO REQUIREMENTS (OUTPATIENT)
Age: 54
End: 2024-01-31
Payer: COMMERCIAL

## 2024-01-31 VITALS
SYSTOLIC BLOOD PRESSURE: 122 MMHG | HEIGHT: 65 IN | BODY MASS INDEX: 24.16 KG/M2 | DIASTOLIC BLOOD PRESSURE: 76 MMHG | HEART RATE: 63 BPM | WEIGHT: 145 LBS

## 2024-01-31 DIAGNOSIS — I42.8 CARDIOMYOPATHY, NONISCHEMIC: Primary | ICD-10-CM

## 2024-01-31 DIAGNOSIS — Z15.89 GENE MUTATION: ICD-10-CM

## 2024-01-31 DIAGNOSIS — I10 ESSENTIAL HYPERTENSION: ICD-10-CM

## 2024-01-31 DIAGNOSIS — I47.20 VENTRICULAR TACHYCARDIA: ICD-10-CM

## 2024-01-31 DIAGNOSIS — Z95.810 ICD (IMPLANTABLE CARDIOVERTER-DEFIBRILLATOR), DUAL, IN SITU: ICD-10-CM

## 2024-01-31 DIAGNOSIS — Z98.890 H/O PRIOR ABLATION TREATMENT: ICD-10-CM

## 2024-01-31 DIAGNOSIS — Z79.899 ON AMIODARONE THERAPY: ICD-10-CM

## 2024-01-31 PROCEDURE — 99213 OFFICE O/P EST LOW 20 MIN: CPT | Performed by: NURSE PRACTITIONER

## 2024-01-31 PROCEDURE — 93000 ELECTROCARDIOGRAM COMPLETE: CPT | Performed by: NURSE PRACTITIONER

## 2024-01-31 NOTE — PROGRESS NOTES
"Date of Office Visit: 2024  Encounter Provider: WILD Lyle  Place of Service: [facility] CARDIOLOGY  Patient Name: Tiffani Fiore  :1970    Chief Complaint   Patient presents with    NICM    Pacemaker Check   :     HPI: Tiffani Fiore is a 53 y.o. female who who follows with Dr. Yancey for NICM, status post multiple VT ablations, the last one done in 2018 by /--epicardial source.     She has a CRT-D but programmed VVI @ 40---LV with really high threshold and does not need atrial pacing at this time.        2020 VT--did okay, monitored, more VT 2021>>VT/ICD shocks, started on amiodarone.     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     I saw her in Nov for complaints of chest pain---atypical, device check was okay.  She did end up going off lisinopril due to orthostatic dizziness--resolved once it was stopped. She has tolerated spironolactone and carvedilol.      She presents today for follow up and device check.     Doing okay---she has some issues with intermittent dizziness but different than it was in the past that resolved after stopping lisiniopril---usually starts with what she describes as a \"bubbly\" feeling in her head---then headache and the dizziness comes--on average last about an hour---she has checked her b/p and usually a little on the high side. It is not arrhythmia related --he has had none per her device interrogation. It is sporadic, no particular triggers.     Otherwise doing okay, no chest pain, dyspnea, PND, orthopnea or edema.     She had labs/amio level in Nov--they were okay.     She has moved to [place] (close to [place]) and is looking to transfer care up there.     Device interrogation shows normal testing and function. V paced 1%, no arrhythmia episodes.      Past Medical History:   Diagnosis Date    Cardiomyopathy     " Chest pain     CHF (congestive heart failure)     Clostridium difficile colitis 2013    Depression     DVT (deep venous thrombosis) 2012    Dyspnea     Fatigue     Gene mutation     DSP --c. 1273 C>T, p.(R425*)    H/O prior ablation treatment     VT ablation 2018--Dr. Levy/    History of paroxysmal atrial tachycardia     History of transfusion     Hyperlipidemia     Hypertension     Hypokalemia     Hypomagnesemia     ICD (implantable cardioverter-defibrillator) discharge     ICD (implantable cardioverter-defibrillator) in place     LEFT SUBCLAVIAN AREA    Ischemic cardiomyopathy     Migraine     Myocardial infarction 2012    Obesity due to excess calories     Overweight (BMI 25.0-29.9)     Palpitations     ICD    PONV (postoperative nausea and vomiting)     Pulmonary embolism     S/P ICD (internal cardiac defibrillator) procedure     SVT (supraventricular tachycardia)     Tachycardia     VT (ventricular tachycardia)     recurrent       Past Surgical History:   Procedure Laterality Date    ABLATION OF DYSRHYTHMIC FOCUS  02/2018    Multiple VT ablations, last on at -Dr Levy, epicardial VT 2/2018    BRONCHOSCOPY N/A 3/8/2018    Procedure: BRONCHOSCOPY WITH BAL;  Surgeon: Berto Nicholson MD;  Location: [facility] ENDOSCOPY;  Service:     CARDIAC CATHETERIZATION N/A 8/30/2017    Procedure: Left Heart Cath;  Surgeon: Saúl Ann MD;  [facility];  Service:     CARDIAC CATHETERIZATION N/A 8/30/2017    Procedure: Left ventriculography;  Surgeon: Saúl Ann MD;  [facility];  Service:     CARDIAC CATHETERIZATION N/A 8/30/2017    Procedure: Coronary angiography;  Surgeon: Saúl Ann MD;  Location: [facility] CATH INVASIVE LOCATION;  Service:     CARDIAC DEFIBRILLATOR PLACEMENT  2012    CARDIAC DEFIBRILLATOR PLACEMENT  2015    MANDROLA DID LEAD REVISION WITH REPLACEMENT    CARDIAC DEFIBRILLATOR PLACEMENT      CARDIAC DEFIBRILLATOR PLACEMENT      CARDIAC  ELECTROPHYSIOLOGY PROCEDURE N/A 2017    Procedure: Ablation VT  PATIENT HAS [place] ICD;  Surgeon: Grayson Yancey MD;  Location: [facility] CATH INVASIVE LOCATION;  Service:      SECTION      HYSTERECTOMY      IMPLANTABLE CARDIOVERTER DEFIBRILLATOR LEAD REPLACEMENT/POCKET REVISION N/A 10/20/2017    Procedure: ICD LEAD EXTRACTION TRANSVENOUS AND PLACEMENT OF RV AND LV LEAD;  Surgeon: Amrit Salgado MD;  Location: [facility] HYBRID OR ;  Service:     TUBAL ABDOMINAL LIGATION         Social History     Socioeconomic History    Marital status: Single   Tobacco Use    Smoking status: Former     Packs/day: 0.50     Years: 15.00     Additional pack years: 0.00     Total pack years: 7.50     Types: Cigarettes     Quit date:      Years since quittin.0    Smokeless tobacco: Former    Tobacco comments:     quit age 42   Vaping Use    Vaping Use: Never used   Substance and Sexual Activity    Alcohol use: No    Drug use: No    Sexual activity: Defer     Partners: Male       Family History   Problem Relation Age of Onset    Diabetes Father     Cancer Father         lung    COPD Mother     Malig Hyperthermia Neg Hx        Review of Systems   Constitutional: Positive for malaise/fatigue. Negative for chills and fever.   Cardiovascular:  Negative for chest pain, dyspnea on exertion, leg swelling, near-syncope, orthopnea, palpitations, paroxysmal nocturnal dyspnea and syncope.   Respiratory:  Negative for cough and shortness of breath.    Musculoskeletal:  Negative for joint pain, joint swelling and myalgias.   Gastrointestinal:  Negative for abdominal pain, diarrhea, melena, nausea and vomiting.   Genitourinary:  Negative for frequency and hematuria.   Neurological:  Negative for light-headedness, numbness, paresthesias and seizures.   Allergic/Immunologic: Negative.    All other systems reviewed and are negative.      No Known Allergies      Current Outpatient Medications:     amiodarone (PACERONE) 200 MG  "tablet, Take 1 tablet by mouth Daily., Disp: 30 tablet, Rfl: 2    carvedilol (COREG) 6.25 MG tablet, Take 1 tablet by mouth 2 (Two) Times a Day With Meals., Disp: 180 tablet, Rfl: 3    spironolactone (ALDACTONE) 25 MG tablet, Take 0.5 tablets by mouth Daily., Disp: 30 tablet, Rfl: 11      Objective:     Vitals:    01/31/24 0915   BP: 122/76   Pulse: 63   Weight: 65.8 kg (145 lb)   Height: 165.1 cm (65\")     Body mass index is 24.13 kg/m².    PHYSICAL EXAM:    Vitals Reviewed.   General Appearance: No acute distress, well developed and well nourished.   HENT: Atraumatic, normocephalic. External eyes, ears, and nose normal.   Respiratory: No signs of respiratory distress. Respiration rhythm and depth normal.   Clear to auscultation. No rales, crackles, rhonchi, or wheezing auscultated.   Cardiovascular:  Heart Rate and Rhythm: Normal, Heart Sounds: Normal S1 and S2.   Murmurs: No murmurs noted. No rubs, thrills, or gallops.   Lower Extremities: No edema noted.  Gastrointestinal:  Abdomen soft, non-distended, non-tender.   Musculoskeletal: Normal movement of extremities  Skin: Warm and dry.   Psychiatric: Patient alert and oriented to person, place, and time. Speech and behavior appropriate. Normal mood and affect.       ECG 12 Lead    Date/Time: 1/31/2024 9:40 AM  Performed by: Maci Menchaca APRN    Authorized by: Maci Menchaca APRN  Comparison: compared with previous ECG   Similar to previous ECG  Rhythm: sinus rhythm  BPM: 63  Conduction: left bundle branch block            Assessment:       Diagnosis Plan   1. Cardiomyopathy, nonischemic        2. Gene mutation-DSP --c.1273 C>T, p.(R425*)        3. ICD (implantable cardioverter-defibrillator), dual, in situ        4. Ventricular tachycardia        5. H/O prior ablation treatment--VT ablation 2018--Dr. Levy/[facility]        6. On amiodarone therapy        7. Essential hypertension               Plan:       1.-6.  NICM, gene mutation, s/p CRT-D but programmed VVI @ 40 " due to high LV threshold and no need for atrial pacing at this time. VT, s/p multiple ablations, remains on amiodarone, no arrhythmia episodes. Labs in Nov were okay.     She has moved to [place], close to [place]---wants to transfer care to someone up there---Dr. Yancey recommended Dr. Berto Sanchez--she will try to get established with him. For now will schedule her follow up in 6 months w/device check in case she has not established care.    She will need amio labs around May--she is aware--told her to call if has not established care by then and we could send orders to somewhere up there.     7. HTN, controlled.     8. Dizziness---I do not think this is orthostatic or heart related---I recommended seeing neurology---she is going to see if she can see someone closer to home in [place].    For now will schedule her to follow up with Dr. Yancey w/device check in 6 months and continue remote follow up.     As always, it has been a pleasure to participate in your patient's care.      Sincerely,         WILD Steele

## 2024-05-30 RX ORDER — AMIODARONE HYDROCHLORIDE 200 MG/1
200 TABLET ORAL DAILY
Qty: 90 TABLET | Refills: 0 | Status: SHIPPED | OUTPATIENT
Start: 2024-05-30

## 2024-08-02 ENCOUNTER — CLINICAL SUPPORT NO REQUIREMENTS (OUTPATIENT)
Age: 54
End: 2024-08-02
Payer: COMMERCIAL

## 2024-08-02 ENCOUNTER — LAB (OUTPATIENT)
Dept: LAB | Facility: HOSPITAL | Age: 54
End: 2024-08-02
Payer: COMMERCIAL

## 2024-08-02 ENCOUNTER — HOSPITAL ENCOUNTER (OUTPATIENT)
Dept: GENERAL RADIOLOGY | Facility: HOSPITAL | Age: 54
Discharge: HOME | End: 2024-08-02
Payer: COMMERCIAL

## 2024-08-02 ENCOUNTER — OFFICE VISIT (OUTPATIENT)
Age: 54
End: 2024-08-02
Payer: COMMERCIAL

## 2024-08-02 VITALS
BODY MASS INDEX: 27.16 KG/M2 | WEIGHT: 163 LBS | DIASTOLIC BLOOD PRESSURE: 70 MMHG | HEART RATE: 71 BPM | SYSTOLIC BLOOD PRESSURE: 116 MMHG | HEIGHT: 65 IN

## 2024-08-02 DIAGNOSIS — Z95.810 ICD (IMPLANTABLE CARDIOVERTER-DEFIBRILLATOR), DUAL, IN SITU: ICD-10-CM

## 2024-08-02 DIAGNOSIS — I42.8 CARDIOMYOPATHY, NONISCHEMIC: Primary | ICD-10-CM

## 2024-08-02 DIAGNOSIS — I47.20 RECURRENT VENTRICULAR TACHYCARDIA: ICD-10-CM

## 2024-08-02 DIAGNOSIS — I47.20 VENTRICULAR TACHYCARDIA: ICD-10-CM

## 2024-08-02 DIAGNOSIS — Z79.899 ON AMIODARONE THERAPY: ICD-10-CM

## 2024-08-02 DIAGNOSIS — I42.8 CARDIOMYOPATHY, NONISCHEMIC: ICD-10-CM

## 2024-08-02 DIAGNOSIS — I47.20 VENTRICULAR TACHYCARDIA: Primary | ICD-10-CM

## 2024-08-02 LAB
ALBUMIN SERPL-MCNC: 4.4 G/DL (ref 3.5–5.2)
ALBUMIN/GLOB SERPL: 1.6 G/DL
ALP SERPL-CCNC: 101 U/L (ref 39–117)
ALT SERPL W P-5'-P-CCNC: 21 U/L (ref 1–33)
ANION GAP SERPL CALCULATED.3IONS-SCNC: 11 MMOL/L (ref 5–15)
AST SERPL-CCNC: 19 U/L (ref 1–32)
BILIRUB SERPL-MCNC: 0.7 MG/DL (ref 0–1.2)
BUN SERPL-MCNC: 12 MG/DL (ref 6–20)
BUN/CREAT SERPL: 13.3 (ref 7–25)
CALCIUM SPEC-SCNC: 9.7 MG/DL (ref 8.6–10.5)
CHLORIDE SERPL-SCNC: 102 MMOL/L (ref 98–107)
CO2 SERPL-SCNC: 24 MMOL/L (ref 22–29)
CREAT SERPL-MCNC: 0.9 MG/DL (ref 0.57–1)
EGFRCR SERPLBLD CKD-EPI 2021: 76.6 ML/MIN/1.73
GLOBULIN UR ELPH-MCNC: 2.7 GM/DL
GLUCOSE SERPL-MCNC: 93 MG/DL (ref 65–99)
POTASSIUM SERPL-SCNC: 4.4 MMOL/L (ref 3.5–5.2)
PROT SERPL-MCNC: 7.1 G/DL (ref 6–8.5)
SODIUM SERPL-SCNC: 137 MMOL/L (ref 136–145)
T4 FREE SERPL-MCNC: 3.09 NG/DL (ref 0.92–1.68)

## 2024-08-02 PROCEDURE — 80151 DRUG ASSAY AMIODARONE: CPT

## 2024-08-02 PROCEDURE — 36415 COLL VENOUS BLD VENIPUNCTURE: CPT

## 2024-08-02 PROCEDURE — 80053 COMPREHEN METABOLIC PANEL: CPT

## 2024-08-02 PROCEDURE — 71046 X-RAY EXAM CHEST 2 VIEWS: CPT

## 2024-08-02 PROCEDURE — 99214 OFFICE O/P EST MOD 30 MIN: CPT | Performed by: INTERNAL MEDICINE

## 2024-08-02 PROCEDURE — 93000 ELECTROCARDIOGRAM COMPLETE: CPT | Performed by: INTERNAL MEDICINE

## 2024-08-02 PROCEDURE — 84439 ASSAY OF FREE THYROXINE: CPT

## 2024-08-02 NOTE — PROGRESS NOTES
Date of Office Visit: 2024  Encounter Provider: Grayson Yancey MD  Place of Service: [facility] CARDIOLOGY  Patient Name: Tiffani Fiore  : 1970    Subjective:     Encounter Date:2024      Patient ID: Tiffani Fiore is a 53 y.o. female who has a cc of  NICM and epicardial VT ablation by Jakob Levy and one recurrence now on amiodarone. She is doing well. She had a CRTD but has a very LV threshold and is programmed VVI 40    No anginal chest pain,   No sig rodriguez,   No soa,   No fainting,  No orthostasis.   No edema.   Exercise tolerance: good.     There have been no hospital admission since the last visit.     There have been no bleeding events.       Past Medical History:   Diagnosis Date    Cardiomyopathy 2012    Chest pain     CHF (congestive heart failure)     Clostridium difficile colitis 2013    Depression     DVT (deep venous thrombosis) 2012    Dyspnea     Fatigue     Gene mutation     DSP --c. 1273 C>T, p.(R425*)    H/O prior ablation treatment     VT ablation --Dr. Levy/    History of paroxysmal atrial tachycardia     History of transfusion     Hyperlipidemia     Hypertension     Hypokalemia     Hypomagnesemia     ICD (implantable cardioverter-defibrillator) discharge     ICD (implantable cardioverter-defibrillator) in place     LEFT SUBCLAVIAN AREA    Ischemic cardiomyopathy     Migraine     Myocardial infarction 2012    Obesity due to excess calories     Overweight (BMI 25.0-29.9)     Palpitations     ICD    PONV (postoperative nausea and vomiting)     Pulmonary embolism     S/P ICD (internal cardiac defibrillator) procedure     SVT (supraventricular tachycardia)     Tachycardia     VT (ventricular tachycardia)     recurrent       Social History     Socioeconomic History    Marital status: Single   Tobacco Use    Smoking status: Former     Current packs/day: 0.00     Average packs/day: 0.5 packs/day for 15.0 years (7.5 ttl pk-yrs)     Types: Cigarettes      "Start date:      Quit date:      Years since quittin.5    Smokeless tobacco: Former    Tobacco comments:     quit age 42   Vaping Use    Vaping status: Never Used   Substance and Sexual Activity    Alcohol use: No    Drug use: No    Sexual activity: Defer     Partners: Male       Family History   Problem Relation Age of Onset    Diabetes Father     Cancer Father         lung    COPD Mother     Malig Hyperthermia Neg Hx        Review of Systems   Constitutional: Negative for fever and night sweats.   HENT:  Negative for ear pain and stridor.    Eyes:  Negative for discharge and visual halos.   Cardiovascular:  Negative for cyanosis.   Respiratory:  Negative for hemoptysis and sputum production.    Hematologic/Lymphatic: Negative for adenopathy.   Skin:  Negative for nail changes and unusual hair distribution.   Musculoskeletal:  Negative for gout and joint swelling.   Gastrointestinal:  Negative for bowel incontinence and flatus.   Genitourinary:  Negative for dysuria and flank pain.   Neurological:  Negative for seizures and tremors.   Psychiatric/Behavioral:  Negative for altered mental status. The patient is not nervous/anxious.             Objective:     Vitals:    24 0932   BP: 116/70   BP Location: Right arm   Patient Position: Sitting   Pulse: 71   Weight: 73.9 kg (163 lb)   Height: 165.1 cm (65\")         Eyes:      General:         Right eye: No discharge.         Left eye: No discharge.   HENT:      Head: Normocephalic and atraumatic.   Neck:      Thyroid: No thyromegaly.      Vascular: No JVD.   Pulmonary:      Effort: Pulmonary effort is normal.      Breath sounds: Normal breath sounds. No rales.   Cardiovascular:      Normal rate. Regular rhythm.      No gallop.    Edema:     Peripheral edema absent.   Abdominal:      General: Bowel sounds are normal.      Palpations: Abdomen is soft.      Tenderness: There is no abdominal tenderness.   Musculoskeletal: Normal range of motion.         " General: No deformity. Skin:     General: Skin is warm and dry.      Findings: No erythema.   Neurological:      Mental Status: Alert and oriented to person, place, and time.      Motor: Normal muscle tone.   Psychiatric:         Behavior: Behavior normal.         Thought Content: Thought content normal.           ECG 12 Lead    Date/Time: 8/2/2024 10:00 AM  Performed by: Grayson Yancey MD    Authorized by: Grayson Yancey MD  Comparison: compared with previous ECG   Similar to previous ECG  Rhythm: sinus rhythm  Conduction: left bundle branch block          Lab Review:       Assessment:          Diagnosis Plan   1. Ventricular tachycardia        2. ICD (implantable cardioverter-defibrillator), dual, in situ        3. Recurrent ventricular tachycardia        4. Cardiomyopathy, nonischemic               Plan:       VT -- no more on amiodarone. WIll check labs     NICM and HFrEF -- dong great on meds. Last EF is 35%     Low BP has limited GDMT

## 2024-08-07 DIAGNOSIS — Z95.810 ICD (IMPLANTABLE CARDIOVERTER-DEFIBRILLATOR), DUAL, IN SITU: Primary | ICD-10-CM

## 2024-08-07 DIAGNOSIS — I47.20 VENTRICULAR TACHYCARDIA: ICD-10-CM

## 2024-08-07 DIAGNOSIS — E03.9 HYPOTHYROIDISM (ACQUIRED): ICD-10-CM

## 2024-08-07 DIAGNOSIS — Z79.899 ON AMIODARONE THERAPY: Primary | ICD-10-CM

## 2024-08-11 PROCEDURE — 93295 DEV INTERROG REMOTE 1/2/MLT: CPT | Performed by: INTERNAL MEDICINE

## 2024-08-11 PROCEDURE — 93296 REM INTERROG EVL PM/IDS: CPT | Performed by: INTERNAL MEDICINE

## 2024-08-13 LAB
AMIODARONE SERPL-MCNC: 1168 NG/ML (ref 1000–2500)
DESETHYLAMIODARONE SERPL-MCNC: 1086 NG/ML

## 2024-09-27 RX ORDER — SPIRONOLACTONE 25 MG/1
25 TABLET ORAL DAILY
Qty: 90 TABLET | Refills: 1 | Status: SHIPPED | OUTPATIENT
Start: 2024-09-27

## 2024-10-03 RX ORDER — AMIODARONE HYDROCHLORIDE 200 MG/1
200 TABLET ORAL DAILY
Qty: 90 TABLET | Refills: 0 | Status: SHIPPED | OUTPATIENT
Start: 2024-10-03

## (undated) DEVICE — SOL ISO/ALC RUB 70PCT 4OZ

## (undated) DEVICE — CATH ELECTRD PACE TEMP BI NONHEP 5F110CM

## (undated) DEVICE — SINGLE USE BIOPSY VALVE MAJ-210: Brand: SINGLE USE BIOPSY VALVE (STERILE)

## (undated) DEVICE — Device: Brand: SMARTABLATE

## (undated) DEVICE — LOU EP: Brand: MEDLINE INDUSTRIES, INC.

## (undated) DEVICE — FIXATION TOOL

## (undated) DEVICE — Device

## (undated) DEVICE — CATH DIAG CARD PERFORM JR4.0 BT 4F100CM

## (undated) DEVICE — INTENDED FOR TISSUE SEPARATION, AND OTHER PROCEDURES THAT REQUIRE A SHARP SURGICAL BLADE TO PUNCTURE OR CUT.: Brand: BARD-PARKER ®  SAFETY SCALPED

## (undated) DEVICE — Device: Brand: THERMOCOOL SF NAV

## (undated) DEVICE — RADIFOCUS TORQUE DEVICE MULTI-TORQUE VISE: Brand: RADIFOCUS TORQUE DEVICE

## (undated) DEVICE — PAD,NON-ADHERENT,2X3,STERILE,LF,1/PK: Brand: MEDLINE

## (undated) DEVICE — SOL IRR NACL 0.9PCT BT 1000ML

## (undated) DEVICE — ADAPT SWVL FIBROPTIC BRONCH

## (undated) DEVICE — SINGLE USE SUCTION VALVE MAJ-209: Brand: SINGLE USE SUCTION VALVE (STERILE)

## (undated) DEVICE — GLIDESHEATH BASIC HYDROPHILIC COATED INTRODUCER SHEATH: Brand: GLIDESHEATH

## (undated) DEVICE — LN SMPL O2 NASL/ORL SMART/CAPNOLINE PLS A/

## (undated) DEVICE — DISPOSABLE ADAPTER

## (undated) DEVICE — SLV STRL PROB

## (undated) DEVICE — VITAL SIGNS™ JACKSON-REES CIRCUITS: Brand: VITAL SIGNS™

## (undated) DEVICE — SUREFIT, DUAL DISPERSIVE ELECTRODE, CONTACT QUALITY MONITOR: Brand: SUREFIT

## (undated) DEVICE — LLD™ ACCESSORY KIT: Brand: LLD™

## (undated) DEVICE — SUT ETHIB 0/0 CT1 30IN X424H

## (undated) DEVICE — SYS PREP BRIDGESHEATH OCCL HEMOS

## (undated) DEVICE — GLIDELIGHT™ LASER SHEATH: Brand: GLIDELIGHT™

## (undated) DEVICE — SHEATH INTRO FASTCATH 0.038GW 8F 12CM

## (undated) DEVICE — TUBING EXTENSION SET: Brand: ARGYLE

## (undated) DEVICE — CATH VENT MIV RADL PIG ST TIP 4F 110CM

## (undated) DEVICE — AICD LEAD STYLETS SOFT

## (undated) DEVICE — CULT AER/ANAEROB FASTIDIOUS BACT

## (undated) DEVICE — INTRO SHEATH PRELUDE SNAP .038 9F 13CM W/SDPRT BLK

## (undated) DEVICE — 3M™ TEGADERM™ TRANSPARENT FILM DRESSING, 1626W, 4 IN X 4-3/4 IN (10 CM X 12 CM), 50 EACH/CARTON, 4 CARTON/CASE: Brand: 3M™ TEGADERM™

## (undated) DEVICE — MSK AIRWY LARYNG LMA UNIQUE STD PK SZ4

## (undated) DEVICE — CVR EQ IMG 48X27

## (undated) DEVICE — GAUZE,SPONGE,4"X4",16PLY,XRAY,STRL,LF: Brand: MEDLINE

## (undated) DEVICE — LEAD LOCKING DEVICE (LLD EZ): Brand: LLD EZ™

## (undated) DEVICE — GUIDE CATHETER: Brand: ACUITY® PRO

## (undated) DEVICE — PK HEART OPN 40

## (undated) DEVICE — GLV SURG TRIUMPH CLASSIC PF LTX 8 STRL

## (undated) DEVICE — EXTENSION SET, MALE LUER LOCK ADAPTER WITH RETRACTABLE COLLAR

## (undated) DEVICE — PK CATH CARD 40

## (undated) DEVICE — Device: Brand: WEBSTER

## (undated) DEVICE — GUIDE CORNRY SIN JUMBO STD 9F 40CM

## (undated) DEVICE — VISISHEATH™ DILATOR SHEATH: Brand: VISISHEATH™

## (undated) DEVICE — TRAP,MUCUS SPECIMEN, 80CC: Brand: MEDLINE

## (undated) DEVICE — TBG ART PRESS 24 IN

## (undated) DEVICE — LOU PACE DEFIB: Brand: MEDLINE INDUSTRIES, INC.

## (undated) DEVICE — GUIDE CORNRY SIN JUMBO 9F 50CM

## (undated) DEVICE — ELECTRD ECG CARBON/SNP RL FM A/ 5PK

## (undated) DEVICE — PLASMABLADE PS200-040 4.0: Brand: PLASMABLADE™

## (undated) DEVICE — RADIFOCUS GLIDEWIRE: Brand: GLIDEWIRE

## (undated) DEVICE — CONTAINER,SPECIMEN,OR STERILE,4OZ: Brand: MEDLINE

## (undated) DEVICE — BALN CORNRY SINUS 6F 1X80CM

## (undated) DEVICE — SOL NACL 0.9PCT 1000ML

## (undated) DEVICE — SUT SILK 0 CT1 CR8 18IN C021D

## (undated) DEVICE — GW EMR FIX EXCHG J STD .035 3MM 260CM

## (undated) DEVICE — CATH DIAG CARD PERFORMA JL3.5 BT 4F100CM

## (undated) DEVICE — ANGIO-SEAL VIP VASCULAR CLOSURE DEVICE: Brand: ANGIO-SEAL

## (undated) DEVICE — INTRO PEELAWAY SAFESHEATH II HEMO 9F23CM

## (undated) DEVICE — LEAD CUTTER™: Brand: LEAD CUTTER™

## (undated) DEVICE — PREF.GUIDING SHEATH W/MULT.CRV: Brand: PREFACE

## (undated) DEVICE — COVER,MAYO STAND,STERILE: Brand: MEDLINE

## (undated) DEVICE — INTRO HEMOS FASTCATH CATHLK 6F 12CM

## (undated) DEVICE — Device: Brand: REFERENCE PATCH CARTO 3

## (undated) DEVICE — DRSNG SURESITE WNDW 4X4.5

## (undated) DEVICE — INTRO SHEATH ART/FEM ENGAGE .035 6F12CM

## (undated) DEVICE — DECANT BG O JET

## (undated) DEVICE — GW INQWIRE FC PTFE STR .035IN 150

## (undated) DEVICE — SPNG GZ WOVN 4X4IN 12PLY 10/BX STRL

## (undated) DEVICE — TUBING, SUCTION, 1/4" X 10', STRAIGHT: Brand: MEDLINE

## (undated) DEVICE — TOWEL,OR,DSP,ST,BLUE,STD,4/PK,20PK/CS: Brand: MEDLINE

## (undated) DEVICE — SYR LUER/TIP MONOJECT RGD/PK 60CC STRL

## (undated) DEVICE — BIDIRECTIONAL TORQUE WRENCH NO2

## (undated) DEVICE — GW AMPLATZ  XSTIFF CVD .032 3MM 180CM